# Patient Record
Sex: FEMALE | Race: WHITE | NOT HISPANIC OR LATINO | Employment: OTHER | URBAN - METROPOLITAN AREA
[De-identification: names, ages, dates, MRNs, and addresses within clinical notes are randomized per-mention and may not be internally consistent; named-entity substitution may affect disease eponyms.]

---

## 2017-03-17 ENCOUNTER — CONVERSION ENCOUNTER (OUTPATIENT)
Dept: MAMMOGRAPHY | Facility: HOSPITAL | Age: 62
End: 2017-03-17

## 2018-02-28 ENCOUNTER — OFFICE VISIT CONVERTED (OUTPATIENT)
Dept: FAMILY MEDICINE CLINIC | Facility: CLINIC | Age: 63
End: 2018-02-28
Attending: NURSE PRACTITIONER

## 2018-04-09 ENCOUNTER — OFFICE VISIT CONVERTED (OUTPATIENT)
Dept: FAMILY MEDICINE CLINIC | Facility: CLINIC | Age: 63
End: 2018-04-09
Attending: NURSE PRACTITIONER

## 2018-04-09 ENCOUNTER — CONVERSION ENCOUNTER (OUTPATIENT)
Dept: FAMILY MEDICINE CLINIC | Facility: CLINIC | Age: 63
End: 2018-04-09

## 2018-08-30 ENCOUNTER — OFFICE VISIT CONVERTED (OUTPATIENT)
Dept: FAMILY MEDICINE CLINIC | Facility: CLINIC | Age: 63
End: 2018-08-30
Attending: NURSE PRACTITIONER

## 2018-08-31 ENCOUNTER — PATIENT OUTREACH - CONVERTED (OUTPATIENT)
Dept: FAMILY MEDICINE CLINIC | Facility: CLINIC | Age: 63
End: 2018-08-31
Attending: NURSE PRACTITIONER

## 2018-09-17 ENCOUNTER — OFFICE VISIT CONVERTED (OUTPATIENT)
Dept: FAMILY MEDICINE CLINIC | Facility: CLINIC | Age: 63
End: 2018-09-17
Attending: NURSE PRACTITIONER

## 2018-09-28 ENCOUNTER — PATIENT OUTREACH - CONVERTED (OUTPATIENT)
Dept: FAMILY MEDICINE CLINIC | Facility: CLINIC | Age: 63
End: 2018-09-28
Attending: NURSE PRACTITIONER

## 2018-10-29 ENCOUNTER — PATIENT OUTREACH - CONVERTED (OUTPATIENT)
Dept: FAMILY MEDICINE CLINIC | Facility: CLINIC | Age: 63
End: 2018-10-29
Attending: NURSE PRACTITIONER

## 2018-11-19 ENCOUNTER — CONVERSION ENCOUNTER (OUTPATIENT)
Dept: FAMILY MEDICINE CLINIC | Facility: CLINIC | Age: 63
End: 2018-11-19

## 2018-11-19 ENCOUNTER — OFFICE VISIT CONVERTED (OUTPATIENT)
Dept: FAMILY MEDICINE CLINIC | Facility: CLINIC | Age: 63
End: 2018-11-19
Attending: NURSE PRACTITIONER

## 2018-11-26 ENCOUNTER — PATIENT OUTREACH - CONVERTED (OUTPATIENT)
Dept: FAMILY MEDICINE CLINIC | Facility: CLINIC | Age: 63
End: 2018-11-26
Attending: NURSE PRACTITIONER

## 2018-12-19 ENCOUNTER — OFFICE VISIT CONVERTED (OUTPATIENT)
Dept: FAMILY MEDICINE CLINIC | Facility: CLINIC | Age: 63
End: 2018-12-19
Attending: NURSE PRACTITIONER

## 2018-12-26 ENCOUNTER — PATIENT OUTREACH - CONVERTED (OUTPATIENT)
Dept: FAMILY MEDICINE CLINIC | Facility: CLINIC | Age: 63
End: 2018-12-26
Attending: NURSE PRACTITIONER

## 2019-01-28 ENCOUNTER — PATIENT OUTREACH - CONVERTED (OUTPATIENT)
Dept: FAMILY MEDICINE CLINIC | Facility: CLINIC | Age: 64
End: 2019-01-28
Attending: NURSE PRACTITIONER

## 2019-02-08 ENCOUNTER — OFFICE VISIT CONVERTED (OUTPATIENT)
Dept: FAMILY MEDICINE CLINIC | Facility: CLINIC | Age: 64
End: 2019-02-08
Attending: NURSE PRACTITIONER

## 2019-02-25 ENCOUNTER — PATIENT OUTREACH - CONVERTED (OUTPATIENT)
Dept: FAMILY MEDICINE CLINIC | Facility: CLINIC | Age: 64
End: 2019-02-25
Attending: NURSE PRACTITIONER

## 2019-03-25 ENCOUNTER — PATIENT OUTREACH - CONVERTED (OUTPATIENT)
Dept: FAMILY MEDICINE CLINIC | Facility: CLINIC | Age: 64
End: 2019-03-25
Attending: NURSE PRACTITIONER

## 2019-04-29 ENCOUNTER — PATIENT OUTREACH - CONVERTED (OUTPATIENT)
Dept: FAMILY MEDICINE CLINIC | Facility: CLINIC | Age: 64
End: 2019-04-29
Attending: NURSE PRACTITIONER

## 2019-05-14 ENCOUNTER — CONVERSION ENCOUNTER (OUTPATIENT)
Dept: ORTHOPEDIC SURGERY | Facility: CLINIC | Age: 64
End: 2019-05-14

## 2019-05-14 ENCOUNTER — OFFICE VISIT CONVERTED (OUTPATIENT)
Dept: ORTHOPEDIC SURGERY | Facility: CLINIC | Age: 64
End: 2019-05-14
Attending: ORTHOPAEDIC SURGERY

## 2019-05-17 ENCOUNTER — CONVERSION ENCOUNTER (OUTPATIENT)
Dept: FAMILY MEDICINE CLINIC | Facility: CLINIC | Age: 64
End: 2019-05-17

## 2019-05-17 ENCOUNTER — OFFICE VISIT CONVERTED (OUTPATIENT)
Dept: FAMILY MEDICINE CLINIC | Facility: CLINIC | Age: 64
End: 2019-05-17
Attending: NURSE PRACTITIONER

## 2019-05-30 ENCOUNTER — PATIENT OUTREACH - CONVERTED (OUTPATIENT)
Dept: FAMILY MEDICINE CLINIC | Facility: CLINIC | Age: 64
End: 2019-05-30
Attending: NURSE PRACTITIONER

## 2019-05-31 ENCOUNTER — OFFICE VISIT CONVERTED (OUTPATIENT)
Dept: ORTHOPEDIC SURGERY | Facility: CLINIC | Age: 64
End: 2019-05-31
Attending: PHYSICIAN ASSISTANT

## 2019-06-18 ENCOUNTER — HOSPITAL ENCOUNTER (OUTPATIENT)
Dept: MAMMOGRAPHY | Facility: HOSPITAL | Age: 64
Discharge: HOME | End: 2019-06-18
Attending: INTERNAL MEDICINE

## 2019-07-02 ENCOUNTER — OFFICE VISIT CONVERTED (OUTPATIENT)
Dept: ORTHOPEDIC SURGERY | Facility: CLINIC | Age: 64
End: 2019-07-02
Attending: PHYSICIAN ASSISTANT

## 2019-07-18 ENCOUNTER — OFFICE VISIT CONVERTED (OUTPATIENT)
Dept: FAMILY MEDICINE CLINIC | Facility: CLINIC | Age: 64
End: 2019-07-18
Attending: NURSE PRACTITIONER

## 2019-07-26 ENCOUNTER — PATIENT OUTREACH - CONVERTED (OUTPATIENT)
Dept: FAMILY MEDICINE CLINIC | Facility: CLINIC | Age: 64
End: 2019-07-26
Attending: NURSE PRACTITIONER

## 2019-08-13 ENCOUNTER — OFFICE VISIT CONVERTED (OUTPATIENT)
Dept: ORTHOPEDIC SURGERY | Facility: CLINIC | Age: 64
End: 2019-08-13
Attending: PHYSICIAN ASSISTANT

## 2019-08-14 ENCOUNTER — HOSPITAL ENCOUNTER (OUTPATIENT)
Dept: FAMILY MEDICINE CLINIC | Facility: CLINIC | Age: 64
Discharge: HOME | End: 2019-08-14
Attending: NURSE PRACTITIONER

## 2019-08-14 LAB
ALBUMIN SERPL-MCNC: 4.3 G/DL (ref 3.5–5)
ALBUMIN/GLOB SERPL: 1.5 {RATIO} (ref 1.4–2.6)
ALP SERPL-CCNC: 66 U/L (ref 43–160)
ALT SERPL-CCNC: 20 U/L (ref 10–40)
ANION GAP SERPL CALC-SCNC: 14 MMOL/L (ref 8–19)
AST SERPL-CCNC: 18 U/L (ref 15–50)
BASOPHILS # BLD AUTO: 0.06 10*3/UL (ref 0–0.2)
BASOPHILS NFR BLD AUTO: 0.9 % (ref 0–3)
BILIRUB SERPL-MCNC: 0.19 MG/DL (ref 0.2–1.3)
BUN SERPL-MCNC: 14 MG/DL (ref 5–25)
BUN/CREAT SERPL: 20 {RATIO} (ref 6–20)
CALCIUM SERPL-MCNC: 9.1 MG/DL (ref 8.7–10.4)
CHLORIDE SERPL-SCNC: 104 MMOL/L (ref 99–111)
CHOLEST SERPL-MCNC: 113 MG/DL (ref 107–200)
CHOLEST/HDLC SERPL: 2.3 {RATIO} (ref 3–6)
CONV ABS IMM GRAN: 0.01 10*3/UL (ref 0–0.2)
CONV CO2: 28 MMOL/L (ref 22–32)
CONV IMMATURE GRAN: 0.2 % (ref 0–1.8)
CONV TOTAL PROTEIN: 7.2 G/DL (ref 6.3–8.2)
CREAT UR-MCNC: 0.69 MG/DL (ref 0.5–0.9)
DEPRECATED RDW RBC AUTO: 48.4 FL (ref 36.4–46.3)
EOSINOPHIL # BLD AUTO: 0.12 10*3/UL (ref 0–0.7)
EOSINOPHIL # BLD AUTO: 1.8 % (ref 0–7)
ERYTHROCYTE [DISTWIDTH] IN BLOOD BY AUTOMATED COUNT: 13.4 % (ref 11.7–14.4)
GFR SERPLBLD BASED ON 1.73 SQ M-ARVRAT: >60 ML/MIN/{1.73_M2}
GLOBULIN UR ELPH-MCNC: 2.9 G/DL (ref 2–3.5)
GLUCOSE SERPL-MCNC: 90 MG/DL (ref 65–99)
HCT VFR BLD AUTO: 42 % (ref 37–47)
HDLC SERPL-MCNC: 50 MG/DL (ref 40–60)
HGB BLD-MCNC: 12.7 G/DL (ref 12–16)
LDLC SERPL CALC-MCNC: 49 MG/DL (ref 70–100)
LYMPHOCYTES # BLD AUTO: 2.87 10*3/UL (ref 1–5)
LYMPHOCYTES NFR BLD AUTO: 44 % (ref 20–45)
MCH RBC QN AUTO: 30 PG (ref 27–31)
MCHC RBC AUTO-ENTMCNC: 30.2 G/DL (ref 33–37)
MCV RBC AUTO: 99.3 FL (ref 81–99)
MONOCYTES # BLD AUTO: 0.39 10*3/UL (ref 0.2–1.2)
MONOCYTES NFR BLD AUTO: 6 % (ref 3–10)
NEUTROPHILS # BLD AUTO: 3.07 10*3/UL (ref 2–8)
NEUTROPHILS NFR BLD AUTO: 47.1 % (ref 30–85)
NRBC CBCN: 0 % (ref 0–0.7)
OSMOLALITY SERPL CALC.SUM OF ELEC: 294 MOSM/KG (ref 273–304)
PLATELET # BLD AUTO: 259 10*3/UL (ref 130–400)
PMV BLD AUTO: 11.9 FL (ref 9.4–12.3)
POTASSIUM SERPL-SCNC: 4.2 MMOL/L (ref 3.5–5.3)
RBC # BLD AUTO: 4.23 10*6/UL (ref 4.2–5.4)
SODIUM SERPL-SCNC: 142 MMOL/L (ref 135–147)
T4 FREE SERPL-MCNC: 1.1 NG/DL (ref 0.9–1.8)
TRIGL SERPL-MCNC: 72 MG/DL (ref 40–150)
TSH SERPL-ACNC: 0.96 M[IU]/L (ref 0.27–4.2)
VLDLC SERPL-MCNC: 14 MG/DL (ref 5–37)
WBC # BLD AUTO: 6.52 10*3/UL (ref 4.8–10.8)

## 2019-08-15 LAB — 25(OH)D3 SERPL-MCNC: 41.2 NG/ML (ref 30–100)

## 2019-08-19 ENCOUNTER — OFFICE VISIT CONVERTED (OUTPATIENT)
Dept: FAMILY MEDICINE CLINIC | Facility: CLINIC | Age: 64
End: 2019-08-19
Attending: NURSE PRACTITIONER

## 2019-08-29 ENCOUNTER — PATIENT OUTREACH - CONVERTED (OUTPATIENT)
Dept: FAMILY MEDICINE CLINIC | Facility: CLINIC | Age: 64
End: 2019-08-29
Attending: NURSE PRACTITIONER

## 2019-09-13 ENCOUNTER — OFFICE VISIT CONVERTED (OUTPATIENT)
Dept: ORTHOPEDIC SURGERY | Facility: CLINIC | Age: 64
End: 2019-09-13
Attending: ORTHOPAEDIC SURGERY

## 2019-09-27 ENCOUNTER — PATIENT OUTREACH - CONVERTED (OUTPATIENT)
Dept: FAMILY MEDICINE CLINIC | Facility: CLINIC | Age: 64
End: 2019-09-27
Attending: NURSE PRACTITIONER

## 2019-11-15 ENCOUNTER — OFFICE VISIT CONVERTED (OUTPATIENT)
Dept: FAMILY MEDICINE CLINIC | Facility: CLINIC | Age: 64
End: 2019-11-15
Attending: NURSE PRACTITIONER

## 2019-11-26 ENCOUNTER — PATIENT OUTREACH - CONVERTED (OUTPATIENT)
Dept: FAMILY MEDICINE CLINIC | Facility: CLINIC | Age: 64
End: 2019-11-26
Attending: NURSE PRACTITIONER

## 2019-12-26 ENCOUNTER — PATIENT OUTREACH - CONVERTED (OUTPATIENT)
Dept: FAMILY MEDICINE CLINIC | Facility: CLINIC | Age: 64
End: 2019-12-26
Attending: NURSE PRACTITIONER

## 2019-12-26 ENCOUNTER — HOSPITAL ENCOUNTER (OUTPATIENT)
Dept: MRI IMAGING | Facility: HOSPITAL | Age: 64
Discharge: HOME | End: 2019-12-26
Attending: NURSE PRACTITIONER

## 2020-01-08 ENCOUNTER — CONVERSION ENCOUNTER (OUTPATIENT)
Dept: SURGERY | Facility: CLINIC | Age: 65
End: 2020-01-08

## 2020-01-08 ENCOUNTER — OFFICE VISIT CONVERTED (OUTPATIENT)
Dept: SURGERY | Facility: CLINIC | Age: 65
End: 2020-01-08
Attending: SURGERY

## 2020-01-17 ENCOUNTER — HOSPITAL ENCOUNTER (OUTPATIENT)
Dept: PERIOP | Facility: HOSPITAL | Age: 65
Setting detail: HOSPITAL OUTPATIENT SURGERY
Discharge: HOME | End: 2020-01-17
Attending: SURGERY

## 2020-01-23 ENCOUNTER — CONVERSION ENCOUNTER (OUTPATIENT)
Dept: SURGERY | Facility: CLINIC | Age: 65
End: 2020-01-23

## 2020-01-23 ENCOUNTER — OFFICE VISIT CONVERTED (OUTPATIENT)
Dept: SURGERY | Facility: CLINIC | Age: 65
End: 2020-01-23
Attending: SURGERY

## 2020-02-12 ENCOUNTER — HOSPITAL ENCOUNTER (OUTPATIENT)
Dept: URGENT CARE | Facility: CLINIC | Age: 65
Discharge: HOME | End: 2020-02-12

## 2020-02-13 ENCOUNTER — OFFICE VISIT CONVERTED (OUTPATIENT)
Dept: SURGERY | Facility: CLINIC | Age: 65
End: 2020-02-13
Attending: SURGERY

## 2020-02-13 ENCOUNTER — CONVERSION ENCOUNTER (OUTPATIENT)
Dept: SURGERY | Facility: CLINIC | Age: 65
End: 2020-02-13

## 2020-03-06 ENCOUNTER — HOSPITAL ENCOUNTER (OUTPATIENT)
Dept: PERIOP | Facility: HOSPITAL | Age: 65
Setting detail: HOSPITAL OUTPATIENT SURGERY
Discharge: HOME | End: 2020-03-06
Attending: SURGERY

## 2020-03-06 LAB — GLUCOSE BLD-MCNC: 100 MG/DL (ref 65–99)

## 2020-03-10 ENCOUNTER — OFFICE VISIT CONVERTED (OUTPATIENT)
Dept: FAMILY MEDICINE CLINIC | Facility: CLINIC | Age: 65
End: 2020-03-10
Attending: NURSE PRACTITIONER

## 2020-03-19 ENCOUNTER — OFFICE VISIT CONVERTED (OUTPATIENT)
Dept: SURGERY | Facility: CLINIC | Age: 65
End: 2020-03-19
Attending: SURGERY

## 2020-04-29 ENCOUNTER — PATIENT OUTREACH - CONVERTED (OUTPATIENT)
Dept: FAMILY MEDICINE CLINIC | Facility: CLINIC | Age: 65
End: 2020-04-29
Attending: NURSE PRACTITIONER

## 2020-05-06 ENCOUNTER — OFFICE VISIT CONVERTED (OUTPATIENT)
Dept: SURGERY | Facility: CLINIC | Age: 65
End: 2020-05-06
Attending: SURGERY

## 2020-05-15 LAB — SARS-COV-2 RNA SPEC QL NAA+PROBE: NOT DETECTED

## 2020-05-18 ENCOUNTER — HOSPITAL ENCOUNTER (OUTPATIENT)
Dept: PERIOP | Facility: HOSPITAL | Age: 65
Setting detail: HOSPITAL OUTPATIENT SURGERY
Discharge: HOME | End: 2020-05-18
Attending: SURGERY

## 2020-06-03 ENCOUNTER — OFFICE VISIT CONVERTED (OUTPATIENT)
Dept: SURGERY | Facility: CLINIC | Age: 65
End: 2020-06-03
Attending: SURGERY

## 2020-06-04 ENCOUNTER — TELEMEDICINE CONVERTED (OUTPATIENT)
Dept: FAMILY MEDICINE CLINIC | Facility: CLINIC | Age: 65
End: 2020-06-04
Attending: NURSE PRACTITIONER

## 2020-06-16 ENCOUNTER — HOSPITAL ENCOUNTER (OUTPATIENT)
Dept: FAMILY MEDICINE CLINIC | Facility: CLINIC | Age: 65
Discharge: HOME | End: 2020-06-16
Attending: NURSE PRACTITIONER

## 2020-06-16 LAB
ALBUMIN SERPL-MCNC: 3.9 G/DL (ref 3.5–5)
ALBUMIN/GLOB SERPL: 1.4 {RATIO} (ref 1.4–2.6)
ALP SERPL-CCNC: 70 U/L (ref 43–160)
ALT SERPL-CCNC: 9 U/L (ref 10–40)
ANION GAP SERPL CALC-SCNC: 16 MMOL/L (ref 8–19)
AST SERPL-CCNC: 13 U/L (ref 15–50)
BASOPHILS # BLD AUTO: 0.07 10*3/UL (ref 0–0.2)
BASOPHILS NFR BLD AUTO: 1.3 % (ref 0–3)
BILIRUB SERPL-MCNC: <0.15 MG/DL (ref 0.2–1.3)
BUN SERPL-MCNC: 16 MG/DL (ref 5–25)
BUN/CREAT SERPL: 19 {RATIO} (ref 6–20)
CALCIUM SERPL-MCNC: 9.4 MG/DL (ref 8.7–10.4)
CHLORIDE SERPL-SCNC: 106 MMOL/L (ref 99–111)
CHOLEST SERPL-MCNC: 181 MG/DL (ref 107–200)
CHOLEST/HDLC SERPL: 2.8 {RATIO} (ref 3–6)
CONV ABS IMM GRAN: 0.01 10*3/UL (ref 0–0.2)
CONV CO2: 27 MMOL/L (ref 22–32)
CONV IMMATURE GRAN: 0.2 % (ref 0–1.8)
CONV TOTAL PROTEIN: 6.7 G/DL (ref 6.3–8.2)
CREAT UR-MCNC: 0.85 MG/DL (ref 0.5–0.9)
DEPRECATED RDW RBC AUTO: 50.7 FL (ref 36.4–46.3)
EOSINOPHIL # BLD AUTO: 0.19 10*3/UL (ref 0–0.7)
EOSINOPHIL # BLD AUTO: 3.5 % (ref 0–7)
ERYTHROCYTE [DISTWIDTH] IN BLOOD BY AUTOMATED COUNT: 14.3 % (ref 11.7–14.4)
GFR SERPLBLD BASED ON 1.73 SQ M-ARVRAT: >60 ML/MIN/{1.73_M2}
GLOBULIN UR ELPH-MCNC: 2.8 G/DL (ref 2–3.5)
GLUCOSE SERPL-MCNC: 133 MG/DL (ref 65–99)
HCT VFR BLD AUTO: 40.7 % (ref 37–47)
HDLC SERPL-MCNC: 65 MG/DL (ref 40–60)
HGB BLD-MCNC: 12.4 G/DL (ref 12–16)
LDLC SERPL CALC-MCNC: 97 MG/DL (ref 70–100)
LYMPHOCYTES # BLD AUTO: 3.01 10*3/UL (ref 1–5)
LYMPHOCYTES NFR BLD AUTO: 55.7 % (ref 20–45)
MCH RBC QN AUTO: 29.3 PG (ref 27–31)
MCHC RBC AUTO-ENTMCNC: 30.5 G/DL (ref 33–37)
MCV RBC AUTO: 96.2 FL (ref 81–99)
MONOCYTES # BLD AUTO: 0.44 10*3/UL (ref 0.2–1.2)
MONOCYTES NFR BLD AUTO: 8.1 % (ref 3–10)
NEUTROPHILS # BLD AUTO: 1.68 10*3/UL (ref 2–8)
NEUTROPHILS NFR BLD AUTO: 31.2 % (ref 30–85)
NRBC CBCN: 0 % (ref 0–0.7)
OSMOLALITY SERPL CALC.SUM OF ELEC: 303 MOSM/KG (ref 273–304)
PLATELET # BLD AUTO: 237 10*3/UL (ref 130–400)
PMV BLD AUTO: 10.4 FL (ref 9.4–12.3)
POTASSIUM SERPL-SCNC: 4.1 MMOL/L (ref 3.5–5.3)
RBC # BLD AUTO: 4.23 10*6/UL (ref 4.2–5.4)
SODIUM SERPL-SCNC: 145 MMOL/L (ref 135–147)
TRIGL SERPL-MCNC: 97 MG/DL (ref 40–150)
TSH SERPL-ACNC: 2.6 M[IU]/L (ref 0.27–4.2)
VLDLC SERPL-MCNC: 19 MG/DL (ref 5–37)
WBC # BLD AUTO: 5.4 10*3/UL (ref 4.8–10.8)

## 2020-06-30 ENCOUNTER — OFFICE VISIT CONVERTED (OUTPATIENT)
Dept: NEUROLOGY | Facility: CLINIC | Age: 65
End: 2020-06-30
Attending: PSYCHIATRY & NEUROLOGY

## 2020-08-04 ENCOUNTER — OFFICE VISIT CONVERTED (OUTPATIENT)
Dept: ORTHOPEDIC SURGERY | Facility: CLINIC | Age: 65
End: 2020-08-04
Attending: ORTHOPAEDIC SURGERY

## 2020-08-08 ENCOUNTER — HOSPITAL ENCOUNTER (OUTPATIENT)
Dept: MRI IMAGING | Facility: HOSPITAL | Age: 65
Discharge: HOME | End: 2020-08-08

## 2020-08-24 ENCOUNTER — TELEMEDICINE CONVERTED (OUTPATIENT)
Dept: FAMILY MEDICINE CLINIC | Facility: CLINIC | Age: 65
End: 2020-08-24
Attending: NURSE PRACTITIONER

## 2020-08-25 ENCOUNTER — CONVERSION ENCOUNTER (OUTPATIENT)
Dept: ORTHOPEDIC SURGERY | Facility: CLINIC | Age: 65
End: 2020-08-25

## 2020-08-25 ENCOUNTER — OFFICE VISIT CONVERTED (OUTPATIENT)
Dept: ORTHOPEDIC SURGERY | Facility: CLINIC | Age: 65
End: 2020-08-25
Attending: PHYSICIAN ASSISTANT

## 2020-08-26 ENCOUNTER — PATIENT OUTREACH - CONVERTED (OUTPATIENT)
Dept: FAMILY MEDICINE CLINIC | Facility: CLINIC | Age: 65
End: 2020-08-26
Attending: NURSE PRACTITIONER

## 2020-10-01 ENCOUNTER — HOSPITAL ENCOUNTER (OUTPATIENT)
Dept: OTHER | Facility: HOSPITAL | Age: 65
Discharge: HOME | End: 2020-10-01
Attending: PHYSICIAN ASSISTANT

## 2020-10-20 ENCOUNTER — OFFICE VISIT CONVERTED (OUTPATIENT)
Dept: ORTHOPEDIC SURGERY | Facility: CLINIC | Age: 65
End: 2020-10-20
Attending: PHYSICIAN ASSISTANT

## 2020-11-11 ENCOUNTER — OFFICE VISIT CONVERTED (OUTPATIENT)
Dept: SURGERY | Facility: CLINIC | Age: 65
End: 2020-11-11
Attending: SURGERY

## 2020-11-30 ENCOUNTER — HOSPITAL ENCOUNTER (OUTPATIENT)
Dept: PREADMISSION TESTING | Facility: HOSPITAL | Age: 65
Discharge: HOME | End: 2020-11-30
Attending: SURGERY

## 2020-12-01 ENCOUNTER — TELEPHONE CONVERTED (OUTPATIENT)
Dept: FAMILY MEDICINE CLINIC | Facility: CLINIC | Age: 65
End: 2020-12-01
Attending: NURSE PRACTITIONER

## 2020-12-01 LAB — SARS-COV-2 RNA SPEC QL NAA+PROBE: NOT DETECTED

## 2020-12-04 ENCOUNTER — HOSPITAL ENCOUNTER (OUTPATIENT)
Dept: PERIOP | Facility: HOSPITAL | Age: 65
Setting detail: HOSPITAL OUTPATIENT SURGERY
Discharge: HOME | End: 2020-12-04
Attending: SURGERY

## 2020-12-23 ENCOUNTER — OFFICE VISIT CONVERTED (OUTPATIENT)
Dept: SURGERY | Facility: CLINIC | Age: 65
End: 2020-12-23
Attending: SURGERY

## 2020-12-31 ENCOUNTER — PATIENT OUTREACH - CONVERTED (OUTPATIENT)
Dept: FAMILY MEDICINE CLINIC | Facility: CLINIC | Age: 65
End: 2020-12-31
Attending: NURSE PRACTITIONER

## 2021-01-01 ENCOUNTER — OFFICE VISIT (OUTPATIENT)
Dept: FAMILY MEDICINE CLINIC | Facility: CLINIC | Age: 66
End: 2021-01-01

## 2021-01-01 ENCOUNTER — PATIENT OUTREACH (OUTPATIENT)
Dept: CASE MANAGEMENT | Facility: OTHER | Age: 66
End: 2021-01-01

## 2021-01-01 ENCOUNTER — TELEPHONE (OUTPATIENT)
Dept: FAMILY MEDICINE CLINIC | Facility: CLINIC | Age: 66
End: 2021-01-01

## 2021-01-01 VITALS
HEART RATE: 75 BPM | SYSTOLIC BLOOD PRESSURE: 144 MMHG | HEIGHT: 66 IN | TEMPERATURE: 97.5 F | OXYGEN SATURATION: 96 % | RESPIRATION RATE: 20 BRPM | BODY MASS INDEX: 25.47 KG/M2 | WEIGHT: 158.5 LBS | DIASTOLIC BLOOD PRESSURE: 80 MMHG

## 2021-01-01 DIAGNOSIS — I10 HYPERTENSION, UNSPECIFIED TYPE: ICD-10-CM

## 2021-01-01 DIAGNOSIS — F41.9 ANXIETY: ICD-10-CM

## 2021-01-01 DIAGNOSIS — B37.0 THRUSH OF MOUTH AND ESOPHAGUS (HCC): Primary | ICD-10-CM

## 2021-01-01 DIAGNOSIS — Z51.81 MEDICATION MONITORING ENCOUNTER: ICD-10-CM

## 2021-01-01 DIAGNOSIS — B37.81 THRUSH OF MOUTH AND ESOPHAGUS (HCC): Primary | ICD-10-CM

## 2021-01-01 DIAGNOSIS — F32.2 MAJOR DEPRESSIVE DISORDER, SEVERE (HCC): Primary | ICD-10-CM

## 2021-01-01 DIAGNOSIS — E78.2 MIXED HYPERLIPIDEMIA: ICD-10-CM

## 2021-01-01 DIAGNOSIS — E03.9 ACQUIRED HYPOTHYROIDISM: ICD-10-CM

## 2021-01-01 DIAGNOSIS — E55.9 VITAMIN D DEFICIENCY: ICD-10-CM

## 2021-01-01 DIAGNOSIS — L30.9 DERMATITIS: ICD-10-CM

## 2021-01-01 DIAGNOSIS — F33.1 MODERATE EPISODE OF RECURRENT MAJOR DEPRESSIVE DISORDER (HCC): ICD-10-CM

## 2021-01-01 DIAGNOSIS — K59.03 CONSTIPATION DUE TO OPIOID THERAPY: ICD-10-CM

## 2021-01-01 DIAGNOSIS — M25.551 RIGHT HIP PAIN: ICD-10-CM

## 2021-01-01 DIAGNOSIS — I10 PRIMARY HYPERTENSION: ICD-10-CM

## 2021-01-01 DIAGNOSIS — Z01.419 WELL WOMAN EXAM WITH ROUTINE GYNECOLOGICAL EXAM: Primary | ICD-10-CM

## 2021-01-01 DIAGNOSIS — Z12.31 ENCOUNTER FOR SCREENING MAMMOGRAM FOR MALIGNANT NEOPLASM OF BREAST: ICD-10-CM

## 2021-01-01 DIAGNOSIS — D64.9 ANEMIA, UNSPECIFIED TYPE: ICD-10-CM

## 2021-01-01 DIAGNOSIS — N18.31 STAGE 3A CHRONIC KIDNEY DISEASE (HCC): ICD-10-CM

## 2021-01-01 DIAGNOSIS — R39.198 ABNORMAL URINATION: ICD-10-CM

## 2021-01-01 DIAGNOSIS — T40.2X5A CONSTIPATION DUE TO OPIOID THERAPY: ICD-10-CM

## 2021-01-01 DIAGNOSIS — Z11.59 NEED FOR HEPATITIS C SCREENING TEST: ICD-10-CM

## 2021-01-01 LAB
ALBUMIN SERPL-MCNC: 4.2 G/DL (ref 3.5–5.2)
ALBUMIN/GLOB SERPL: 1.9 G/DL
ALP SERPL-CCNC: 71 U/L (ref 39–117)
ALT SERPL W P-5'-P-CCNC: 8 U/L (ref 1–33)
ANION GAP SERPL CALCULATED.3IONS-SCNC: 8.6 MMOL/L (ref 5–15)
AST SERPL-CCNC: 12 U/L (ref 1–32)
BASOPHILS # BLD AUTO: 0.04 10*3/MM3 (ref 0–0.2)
BASOPHILS NFR BLD AUTO: 0.7 % (ref 0–1.5)
BILIRUB BLD-MCNC: ABNORMAL MG/DL
BILIRUB SERPL-MCNC: <0.2 MG/DL (ref 0–1.2)
BUN SERPL-MCNC: 18 MG/DL (ref 8–23)
BUN/CREAT SERPL: 17.3 (ref 7–25)
CALCIUM SPEC-SCNC: 8.9 MG/DL (ref 8.6–10.5)
CHLORIDE SERPL-SCNC: 102 MMOL/L (ref 98–107)
CHOLEST SERPL-MCNC: 161 MG/DL (ref 0–200)
CLARITY, POC: CLEAR
CO2 SERPL-SCNC: 28.4 MMOL/L (ref 22–29)
COLOR UR: YELLOW
CONV .: NORMAL
CREAT SERPL-MCNC: 1.04 MG/DL (ref 0.57–1)
CYTOLOGIST CVX/VAG CYTO: NORMAL
CYTOLOGY CVX/VAG DOC CYTO: NORMAL
CYTOLOGY CVX/VAG DOC THIN PREP: NORMAL
DEPRECATED RDW RBC AUTO: 45.1 FL (ref 37–54)
DX ICD CODE: NORMAL
EOSINOPHIL # BLD AUTO: 0.1 10*3/MM3 (ref 0–0.4)
EOSINOPHIL NFR BLD AUTO: 1.7 % (ref 0.3–6.2)
ERYTHROCYTE [DISTWIDTH] IN BLOOD BY AUTOMATED COUNT: 13.2 % (ref 12.3–15.4)
EXPIRATION DATE: ABNORMAL
FOLATE SERPL-MCNC: 4.13 NG/ML (ref 4.78–24.2)
GFR SERPL CREATININE-BSD FRML MDRD: 53 ML/MIN/1.73
GLOBULIN UR ELPH-MCNC: 2.2 GM/DL
GLUCOSE SERPL-MCNC: 90 MG/DL (ref 65–99)
GLUCOSE UR STRIP-MCNC: NEGATIVE MG/DL
HCT VFR BLD AUTO: 35.9 % (ref 34–46.6)
HCV AB SER DONR QL: NORMAL
HDLC SERPL-MCNC: 54 MG/DL (ref 40–60)
HGB BLD-MCNC: 11.7 G/DL (ref 12–15.9)
HIV 1 & 2 AB SER-IMP: NORMAL
IMM GRANULOCYTES # BLD AUTO: 0.01 10*3/MM3 (ref 0–0.05)
IMM GRANULOCYTES NFR BLD AUTO: 0.2 % (ref 0–0.5)
IRON 24H UR-MRATE: 88 MCG/DL (ref 37–145)
IRON SATN MFR SERPL: 24 % (ref 20–50)
KETONES UR QL: NEGATIVE
LDLC SERPL CALC-MCNC: 79 MG/DL (ref 0–100)
LDLC/HDLC SERPL: 1.37 {RATIO}
LEUKOCYTE EST, POC: NEGATIVE
LYMPHOCYTES # BLD AUTO: 2.58 10*3/MM3 (ref 0.7–3.1)
LYMPHOCYTES NFR BLD AUTO: 44 % (ref 19.6–45.3)
Lab: ABNORMAL
MCH RBC QN AUTO: 30.3 PG (ref 26.6–33)
MCHC RBC AUTO-ENTMCNC: 32.6 G/DL (ref 31.5–35.7)
MCV RBC AUTO: 93 FL (ref 79–97)
MONOCYTES # BLD AUTO: 0.41 10*3/MM3 (ref 0.1–0.9)
MONOCYTES NFR BLD AUTO: 7 % (ref 5–12)
NEUTROPHILS NFR BLD AUTO: 2.73 10*3/MM3 (ref 1.7–7)
NEUTROPHILS NFR BLD AUTO: 46.4 % (ref 42.7–76)
NITRITE UR-MCNC: NEGATIVE MG/ML
NRBC BLD AUTO-RTO: 0 /100 WBC (ref 0–0.2)
OTHER STN SPEC: NORMAL
PH UR: 7 [PH] (ref 5–8)
PLATELET # BLD AUTO: 222 10*3/MM3 (ref 140–450)
PMV BLD AUTO: 11 FL (ref 6–12)
POTASSIUM SERPL-SCNC: 4.1 MMOL/L (ref 3.5–5.2)
PROT SERPL-MCNC: 6.4 G/DL (ref 6–8.5)
PROT UR STRIP-MCNC: ABNORMAL MG/DL
RBC # BLD AUTO: 3.86 10*6/MM3 (ref 3.77–5.28)
RBC # UR STRIP: NEGATIVE /UL
SODIUM SERPL-SCNC: 139 MMOL/L (ref 136–145)
SP GR UR: 1.02 (ref 1–1.03)
STAT OF ADQ CVX/VAG CYTO-IMP: NORMAL
TIBC SERPL-MCNC: 365 MCG/DL (ref 298–536)
TRANSFERRIN SERPL-MCNC: 245 MG/DL (ref 200–360)
TRIGL SERPL-MCNC: 164 MG/DL (ref 0–150)
TSH SERPL DL<=0.05 MIU/L-ACNC: 1.43 UIU/ML (ref 0.27–4.2)
UROBILINOGEN UR QL: NORMAL
VIT B12 BLD-MCNC: 942 PG/ML (ref 211–946)
VLDLC SERPL-MCNC: 28 MG/DL (ref 5–40)
WBC NRBC COR # BLD: 5.87 10*3/MM3 (ref 3.4–10.8)

## 2021-01-01 PROCEDURE — 84466 ASSAY OF TRANSFERRIN: CPT | Performed by: NURSE PRACTITIONER

## 2021-01-01 PROCEDURE — 99397 PER PM REEVAL EST PAT 65+ YR: CPT | Performed by: NURSE PRACTITIONER

## 2021-01-01 PROCEDURE — 80053 COMPREHEN METABOLIC PANEL: CPT | Performed by: NURSE PRACTITIONER

## 2021-01-01 PROCEDURE — 85025 COMPLETE CBC W/AUTO DIFF WBC: CPT | Performed by: NURSE PRACTITIONER

## 2021-01-01 PROCEDURE — G0123 SCREEN CERV/VAG THIN LAYER: HCPCS | Performed by: NURSE PRACTITIONER

## 2021-01-01 PROCEDURE — 99213 OFFICE O/P EST LOW 20 MIN: CPT | Performed by: NURSE PRACTITIONER

## 2021-01-01 PROCEDURE — 82607 VITAMIN B-12: CPT | Performed by: NURSE PRACTITIONER

## 2021-01-01 PROCEDURE — 80061 LIPID PANEL: CPT | Performed by: NURSE PRACTITIONER

## 2021-01-01 PROCEDURE — 83540 ASSAY OF IRON: CPT | Performed by: NURSE PRACTITIONER

## 2021-01-01 PROCEDURE — 81003 URINALYSIS AUTO W/O SCOPE: CPT | Performed by: NURSE PRACTITIONER

## 2021-01-01 PROCEDURE — 99490 CHRNC CARE MGMT STAFF 1ST 20: CPT | Performed by: NURSE PRACTITIONER

## 2021-01-01 PROCEDURE — 84443 ASSAY THYROID STIM HORMONE: CPT | Performed by: NURSE PRACTITIONER

## 2021-01-01 PROCEDURE — 36415 COLL VENOUS BLD VENIPUNCTURE: CPT | Performed by: NURSE PRACTITIONER

## 2021-01-01 PROCEDURE — 86803 HEPATITIS C AB TEST: CPT | Performed by: NURSE PRACTITIONER

## 2021-01-01 PROCEDURE — 82746 ASSAY OF FOLIC ACID SERUM: CPT | Performed by: NURSE PRACTITIONER

## 2021-01-01 RX ORDER — TRIAMCINOLONE ACETONIDE 1 MG/G
CREAM TOPICAL 2 TIMES DAILY
Qty: 28 G | Refills: 0 | Status: SHIPPED | OUTPATIENT
Start: 2021-01-01

## 2021-01-01 RX ORDER — CYCLOBENZAPRINE HCL 10 MG
10 TABLET ORAL 3 TIMES DAILY PRN
Qty: 90 TABLET | Refills: 0 | Status: SHIPPED | OUTPATIENT
Start: 2021-01-01

## 2021-01-01 RX ORDER — TOPIRAMATE 100 MG/1
100 TABLET, FILM COATED ORAL DAILY
Qty: 30 TABLET | Refills: 0 | Status: SHIPPED | OUTPATIENT
Start: 2021-01-01

## 2021-01-01 RX ORDER — CHOLECALCIFEROL (VITAMIN D3) 1250 MCG
50000 CAPSULE ORAL
Qty: 13 CAPSULE | Refills: 1 | Status: SHIPPED | OUTPATIENT
Start: 2021-01-01 | End: 2021-01-01 | Stop reason: SDUPTHER

## 2021-01-01 RX ORDER — AMMONIUM LACTATE 12 G/100G
CREAM TOPICAL AS NEEDED
Qty: 1 EACH | Refills: 5 | Status: SHIPPED | OUTPATIENT
Start: 2021-01-01 | End: 2021-01-01 | Stop reason: SDUPTHER

## 2021-01-01 RX ORDER — ARIPIPRAZOLE 5 MG/1
5 TABLET ORAL DAILY
Qty: 90 TABLET | Refills: 1 | Status: SHIPPED | OUTPATIENT
Start: 2021-01-01

## 2021-01-01 RX ORDER — AMMONIUM LACTATE 12 G/100G
CREAM TOPICAL DAILY
Qty: 1 EACH | Refills: 5 | Status: SHIPPED | OUTPATIENT
Start: 2021-01-01 | End: 2021-01-01

## 2021-01-01 RX ORDER — AMMONIUM LACTATE 12 G/100G
CREAM TOPICAL DAILY
Qty: 1 EACH | Refills: 5 | Status: SHIPPED | OUTPATIENT
Start: 2021-01-01

## 2021-01-01 RX ORDER — CHOLECALCIFEROL (VITAMIN D3) 1250 MCG
50000 CAPSULE ORAL
Qty: 13 CAPSULE | Refills: 1 | Status: SHIPPED | OUTPATIENT
Start: 2021-01-01

## 2021-01-01 RX ORDER — ESCITALOPRAM OXALATE 10 MG/1
10 TABLET ORAL DAILY
Qty: 30 TABLET | Refills: 0 | Status: SHIPPED | OUTPATIENT
Start: 2021-01-01 | End: 2022-01-01

## 2021-02-04 ENCOUNTER — OFFICE VISIT CONVERTED (OUTPATIENT)
Dept: ORTHOPEDIC SURGERY | Facility: CLINIC | Age: 66
End: 2021-02-04
Attending: PHYSICIAN ASSISTANT

## 2021-02-25 ENCOUNTER — TELEPHONE CONVERTED (OUTPATIENT)
Dept: FAMILY MEDICINE CLINIC | Facility: CLINIC | Age: 66
End: 2021-02-25
Attending: NURSE PRACTITIONER

## 2021-02-26 ENCOUNTER — PATIENT OUTREACH - CONVERTED (OUTPATIENT)
Dept: FAMILY MEDICINE CLINIC | Facility: CLINIC | Age: 66
End: 2021-02-26
Attending: NURSE PRACTITIONER

## 2021-03-08 ENCOUNTER — HOSPITAL ENCOUNTER (OUTPATIENT)
Dept: PREADMISSION TESTING | Facility: HOSPITAL | Age: 66
Discharge: HOME | End: 2021-03-08
Attending: ORTHOPAEDIC SURGERY

## 2021-03-08 LAB
ALBUMIN SERPL-MCNC: 3.7 G/DL (ref 3.5–5)
ALBUMIN/GLOB SERPL: 1.3 {RATIO} (ref 1.4–2.6)
ALP SERPL-CCNC: 81 U/L (ref 43–160)
ALT SERPL-CCNC: 19 U/L (ref 10–40)
ANION GAP SERPL CALC-SCNC: 9 MMOL/L (ref 8–19)
APTT BLD: 21.6 S (ref 22.2–34.2)
AST SERPL-CCNC: 13 U/L (ref 15–50)
BASOPHILS # BLD AUTO: 0.05 10*3/UL (ref 0–0.2)
BASOPHILS NFR BLD AUTO: 0.7 % (ref 0–3)
BILIRUB SERPL-MCNC: 0.16 MG/DL (ref 0.2–1.3)
BUN SERPL-MCNC: 23 MG/DL (ref 5–25)
BUN/CREAT SERPL: 32 {RATIO} (ref 6–20)
CALCIUM SERPL-MCNC: 9.1 MG/DL (ref 8.7–10.4)
CHLORIDE SERPL-SCNC: 108 MMOL/L (ref 99–111)
CONV ABS IMM GRAN: 0.02 10*3/UL (ref 0–0.2)
CONV CO2: 29 MMOL/L (ref 22–32)
CONV IMMATURE GRAN: 0.3 % (ref 0–1.8)
CONV TOTAL PROTEIN: 6.6 G/DL (ref 6.3–8.2)
CREAT UR-MCNC: 0.72 MG/DL (ref 0.5–0.9)
DEPRECATED RDW RBC AUTO: 49.1 FL (ref 36.4–46.3)
EOSINOPHIL # BLD AUTO: 0.11 10*3/UL (ref 0–0.7)
EOSINOPHIL # BLD AUTO: 1.5 % (ref 0–7)
ERYTHROCYTE [DISTWIDTH] IN BLOOD BY AUTOMATED COUNT: 13.9 % (ref 11.7–14.4)
EST. AVERAGE GLUCOSE BLD GHB EST-MCNC: 114 MG/DL
GFR SERPLBLD BASED ON 1.73 SQ M-ARVRAT: >60 ML/MIN/{1.73_M2}
GLOBULIN UR ELPH-MCNC: 2.9 G/DL (ref 2–3.5)
GLUCOSE SERPL-MCNC: 99 MG/DL (ref 65–99)
HBA1C MFR BLD: 5.6 % (ref 3.5–5.7)
HCT VFR BLD AUTO: 42.3 % (ref 37–47)
HGB BLD-MCNC: 13.4 G/DL (ref 12–16)
INR PPP: 0.89 (ref 2–3)
LYMPHOCYTES # BLD AUTO: 2.23 10*3/UL (ref 1–5)
LYMPHOCYTES NFR BLD AUTO: 31.3 % (ref 20–45)
MCH RBC QN AUTO: 30.3 PG (ref 27–31)
MCHC RBC AUTO-ENTMCNC: 31.7 G/DL (ref 33–37)
MCV RBC AUTO: 95.7 FL (ref 81–99)
MONOCYTES # BLD AUTO: 0.44 10*3/UL (ref 0.2–1.2)
MONOCYTES NFR BLD AUTO: 6.2 % (ref 3–10)
NEUTROPHILS # BLD AUTO: 4.28 10*3/UL (ref 2–8)
NEUTROPHILS NFR BLD AUTO: 60 % (ref 30–85)
NRBC CBCN: 0 % (ref 0–0.7)
OSMOLALITY SERPL CALC.SUM OF ELEC: 298 MOSM/KG (ref 273–304)
PLATELET # BLD AUTO: 225 10*3/UL (ref 130–400)
PMV BLD AUTO: 9.3 FL (ref 9.4–12.3)
POTASSIUM SERPL-SCNC: 4.1 MMOL/L (ref 3.5–5.3)
PROTHROMBIN TIME: 9.9 S (ref 9.4–12)
RBC # BLD AUTO: 4.42 10*6/UL (ref 4.2–5.4)
SODIUM SERPL-SCNC: 142 MMOL/L (ref 135–147)
WBC # BLD AUTO: 7.13 10*3/UL (ref 4.8–10.8)

## 2021-03-18 ENCOUNTER — HOSPITAL ENCOUNTER (OUTPATIENT)
Dept: PREADMISSION TESTING | Facility: HOSPITAL | Age: 66
Discharge: HOME | End: 2021-03-18
Attending: ORTHOPAEDIC SURGERY

## 2021-03-19 LAB — SARS-COV-2 RNA SPEC QL NAA+PROBE: NOT DETECTED

## 2021-03-22 ENCOUNTER — HOSPITAL ENCOUNTER (OUTPATIENT)
Dept: PERIOP | Facility: HOSPITAL | Age: 66
Setting detail: HOSPITAL OUTPATIENT SURGERY
Discharge: NURSING, REHAB OR LONG-TERM CARE | End: 2021-03-24
Attending: INTERNAL MEDICINE

## 2021-03-23 LAB
ANION GAP SERPL CALC-SCNC: 10 MMOL/L (ref 8–19)
BUN SERPL-MCNC: 14 MG/DL (ref 5–25)
BUN/CREAT SERPL: 19 {RATIO} (ref 6–20)
CALCIUM SERPL-MCNC: 8 MG/DL (ref 8.7–10.4)
CHLORIDE SERPL-SCNC: 101 MMOL/L (ref 99–111)
CONV CO2: 27 MMOL/L (ref 22–32)
CREAT UR-MCNC: 0.72 MG/DL (ref 0.5–0.9)
GFR SERPLBLD BASED ON 1.73 SQ M-ARVRAT: >60 ML/MIN/{1.73_M2}
GLUCOSE SERPL-MCNC: 104 MG/DL (ref 65–99)
HCT VFR BLD AUTO: 32.9 % (ref 37–47)
HGB BLD-MCNC: 10.3 G/DL (ref 12–16)
OSMOLALITY SERPL CALC.SUM OF ELEC: 279 MOSM/KG (ref 273–304)
POTASSIUM SERPL-SCNC: 3.9 MMOL/L (ref 3.5–5.3)
SODIUM SERPL-SCNC: 134 MMOL/L (ref 135–147)

## 2021-03-24 LAB
ANION GAP SERPL CALC-SCNC: 12 MMOL/L (ref 8–19)
BASOPHILS # BLD AUTO: 0.03 10*3/UL (ref 0–0.2)
BASOPHILS NFR BLD AUTO: 0.5 % (ref 0–3)
BUN SERPL-MCNC: 12 MG/DL (ref 5–25)
BUN/CREAT SERPL: 18 {RATIO} (ref 6–20)
CALCIUM SERPL-MCNC: 8.4 MG/DL (ref 8.7–10.4)
CHLORIDE SERPL-SCNC: 103 MMOL/L (ref 99–111)
CONV ABS IMM GRAN: 0.03 10*3/UL (ref 0–0.2)
CONV CO2: 25 MMOL/L (ref 22–32)
CONV IMMATURE GRAN: 0.5 % (ref 0–1.8)
CREAT UR-MCNC: 0.65 MG/DL (ref 0.5–0.9)
DEPRECATED RDW RBC AUTO: 46.5 FL (ref 36.4–46.3)
EOSINOPHIL # BLD AUTO: 0.08 10*3/UL (ref 0–0.7)
EOSINOPHIL # BLD AUTO: 1.3 % (ref 0–7)
ERYTHROCYTE [DISTWIDTH] IN BLOOD BY AUTOMATED COUNT: 13.6 % (ref 11.7–14.4)
GFR SERPLBLD BASED ON 1.73 SQ M-ARVRAT: >60 ML/MIN/{1.73_M2}
GLUCOSE SERPL-MCNC: 125 MG/DL (ref 65–99)
HCT VFR BLD AUTO: 30.3 % (ref 37–47)
HGB BLD-MCNC: 9.7 G/DL (ref 12–16)
LYMPHOCYTES # BLD AUTO: 1.36 10*3/UL (ref 1–5)
LYMPHOCYTES NFR BLD AUTO: 22.4 % (ref 20–45)
MCH RBC QN AUTO: 30.1 PG (ref 27–31)
MCHC RBC AUTO-ENTMCNC: 32 G/DL (ref 33–37)
MCV RBC AUTO: 94.1 FL (ref 81–99)
MONOCYTES # BLD AUTO: 0.34 10*3/UL (ref 0.2–1.2)
MONOCYTES NFR BLD AUTO: 5.6 % (ref 3–10)
NEUTROPHILS # BLD AUTO: 4.22 10*3/UL (ref 2–8)
NEUTROPHILS NFR BLD AUTO: 69.7 % (ref 30–85)
NRBC CBCN: 0 % (ref 0–0.7)
OSMOLALITY SERPL CALC.SUM OF ELEC: 285 MOSM/KG (ref 273–304)
PLATELET # BLD AUTO: 138 10*3/UL (ref 130–400)
PMV BLD AUTO: 10 FL (ref 9.4–12.3)
POTASSIUM SERPL-SCNC: 3.3 MMOL/L (ref 3.5–5.3)
RBC # BLD AUTO: 3.22 10*6/UL (ref 4.2–5.4)
SARS-COV-2 RNA SPEC QL NAA+PROBE: NOT DETECTED
SODIUM SERPL-SCNC: 137 MMOL/L (ref 135–147)
WBC # BLD AUTO: 6.06 10*3/UL (ref 4.8–10.8)

## 2021-04-08 ENCOUNTER — OFFICE VISIT CONVERTED (OUTPATIENT)
Dept: ORTHOPEDIC SURGERY | Facility: CLINIC | Age: 66
End: 2021-04-08

## 2021-04-26 ENCOUNTER — TELEPHONE CONVERTED (OUTPATIENT)
Dept: FAMILY MEDICINE CLINIC | Facility: CLINIC | Age: 66
End: 2021-04-26
Attending: NURSE PRACTITIONER

## 2021-04-30 ENCOUNTER — OFFICE VISIT CONVERTED (OUTPATIENT)
Dept: FAMILY MEDICINE CLINIC | Facility: CLINIC | Age: 66
End: 2021-04-30
Attending: NURSE PRACTITIONER

## 2021-04-30 ENCOUNTER — HOSPITAL ENCOUNTER (OUTPATIENT)
Dept: FAMILY MEDICINE CLINIC | Facility: CLINIC | Age: 66
Discharge: HOME | End: 2021-04-30
Attending: NURSE PRACTITIONER

## 2021-04-30 LAB
25(OH)D3 SERPL-MCNC: 27.6 NG/ML (ref 30–100)
ALBUMIN SERPL-MCNC: 3.9 G/DL (ref 3.5–5)
ALBUMIN/GLOB SERPL: 1.3 {RATIO} (ref 1.4–2.6)
ALP SERPL-CCNC: 104 U/L (ref 43–160)
ALT SERPL-CCNC: 11 U/L (ref 10–40)
ANION GAP SERPL CALC-SCNC: 14 MMOL/L (ref 8–19)
AST SERPL-CCNC: 19 U/L (ref 15–50)
BASOPHILS # BLD AUTO: 0.07 10*3/UL (ref 0–0.2)
BASOPHILS NFR BLD AUTO: 1 % (ref 0–3)
BILIRUB SERPL-MCNC: 0.28 MG/DL (ref 0.2–1.3)
BUN SERPL-MCNC: 17 MG/DL (ref 5–25)
BUN/CREAT SERPL: 17 {RATIO} (ref 6–20)
CALCIUM SERPL-MCNC: 9.1 MG/DL (ref 8.7–10.4)
CHLORIDE SERPL-SCNC: 101 MMOL/L (ref 99–111)
CHOLEST SERPL-MCNC: 175 MG/DL (ref 107–200)
CHOLEST/HDLC SERPL: 2.5 {RATIO} (ref 3–6)
CONV ABS IMM GRAN: 0.02 10*3/UL (ref 0–0.2)
CONV CO2: 31 MMOL/L (ref 22–32)
CONV IMMATURE GRAN: 0.3 % (ref 0–1.8)
CONV TOTAL PROTEIN: 7 G/DL (ref 6.3–8.2)
CREAT UR-MCNC: 1.02 MG/DL (ref 0.5–0.9)
DEPRECATED RDW RBC AUTO: 50.4 FL (ref 36.4–46.3)
EOSINOPHIL # BLD AUTO: 0.13 10*3/UL (ref 0–0.7)
EOSINOPHIL # BLD AUTO: 1.9 % (ref 0–7)
ERYTHROCYTE [DISTWIDTH] IN BLOOD BY AUTOMATED COUNT: 14.4 % (ref 11.7–14.4)
GFR SERPLBLD BASED ON 1.73 SQ M-ARVRAT: 58 ML/MIN/{1.73_M2}
GLOBULIN UR ELPH-MCNC: 3.1 G/DL (ref 2–3.5)
GLUCOSE SERPL-MCNC: 104 MG/DL (ref 65–99)
HCT VFR BLD AUTO: 41.3 % (ref 37–47)
HDLC SERPL-MCNC: 71 MG/DL (ref 40–60)
HGB BLD-MCNC: 12.8 G/DL (ref 12–16)
LDLC SERPL CALC-MCNC: 80 MG/DL (ref 70–100)
LYMPHOCYTES # BLD AUTO: 2.48 10*3/UL (ref 1–5)
LYMPHOCYTES NFR BLD AUTO: 36.5 % (ref 20–45)
MCH RBC QN AUTO: 29.3 PG (ref 27–31)
MCHC RBC AUTO-ENTMCNC: 31 G/DL (ref 33–37)
MCV RBC AUTO: 94.5 FL (ref 81–99)
MONOCYTES # BLD AUTO: 0.45 10*3/UL (ref 0.2–1.2)
MONOCYTES NFR BLD AUTO: 6.6 % (ref 3–10)
NEUTROPHILS # BLD AUTO: 3.65 10*3/UL (ref 2–8)
NEUTROPHILS NFR BLD AUTO: 53.7 % (ref 30–85)
NRBC CBCN: 0 % (ref 0–0.7)
OSMOLALITY SERPL CALC.SUM OF ELEC: 296 MOSM/KG (ref 273–304)
PLATELET # BLD AUTO: 286 10*3/UL (ref 130–400)
PMV BLD AUTO: 9.7 FL (ref 9.4–12.3)
POTASSIUM SERPL-SCNC: 3.8 MMOL/L (ref 3.5–5.3)
RBC # BLD AUTO: 4.37 10*6/UL (ref 4.2–5.4)
SODIUM SERPL-SCNC: 142 MMOL/L (ref 135–147)
TRIGL SERPL-MCNC: 118 MG/DL (ref 40–150)
TSH SERPL-ACNC: 0.76 M[IU]/L (ref 0.27–4.2)
VIT B12 SERPL-MCNC: 324 PG/ML (ref 211–911)
VLDLC SERPL-MCNC: 24 MG/DL (ref 5–37)
WBC # BLD AUTO: 6.8 10*3/UL (ref 4.8–10.8)

## 2021-05-06 ENCOUNTER — OFFICE VISIT CONVERTED (OUTPATIENT)
Dept: ORTHOPEDIC SURGERY | Facility: CLINIC | Age: 66
End: 2021-05-06
Attending: PHYSICIAN ASSISTANT

## 2021-05-10 NOTE — OUTREACH NOTE
Quick Note      Patient Name: Annetta Malagon   Patient ID: [id number]   Sex: Female   YOB: 1955    Primary Care Provider: Sonia HENDRICKS   Referring Provider: Matt Hoover MD    Visit Date: August 26, 2020    Provider: ELADIO Grove   Location: [facility]   Location Address: [address] [place] [address] JUMA Reynolds  [id number]   Location Phone: [phone]          History Of Present Illness     [facility]     TaskID: [id number]    Task Subject: CCM notes August 2020    Task Comments:    Date: Aug 26 2020  1:18PM     Creator: ty  Follow up [facility] PCP this month was telehealth due to COVID 19 pandemic. She is to continue medications and added 1/2 Lasix 20mg, take 10mg daily for leg and ankle swelling, she is unable to do her compression socks so PCP recommends to use diabetic socks as easier to pull up. Refill on trazodone and return in 3 months. She also say orthopedics and she had a righ hip injection for pain and was instructed to f/u in 8 weeks with them and to use the compression sock for left ankle swelling per Dr. Bernice Salmeron. (chart review 7 minutes)    Date: Aug 10 2020  2:21PM     Creator: ty  Medication list updated per CCM call with patient. (5 minutes)    Date: Aug 10 2020  2:14PM     Creator: ty  (cont) scheduled and she would like to return to the doctor on [address] she saw previously like 7 years ago. Noted that would be Dr. Randell Hdz ([phone]) she will discuss that with Sonia. She did not have a follow up with PCP. She wants to wait 2 weeks, I scheduled her for a telehealth visit with Sonia for 8/24/2020. (26 minutes)    Date: Aug 10 2020  2:10PM     Creator: ty  I called anneent, she is doing pretty good, I woke her form a nap. We reviewed her medication list and noted she is not on any chew tablets of Vitamin Calrate with Vit D and said insurance probably would not pay for it. She would like to see if the Vit D3 she is on could be  sent in as script to see if insurance will cover it. Her Gabapentin is 800mg TID now and her Percocet is Q6h not BID. I will update med list. She is doing the Mobic form ortho and her plans are to have her hip done after October 23 due to her son is getting  in [facility] and she is flying down for the wedding. She reports she will have to go to rehab after her surgery because her sister will not take care of her. She took out her second bedroom from her house and took her couch bed out so no one could stay with her and her last surgery she had her sister only came by once to help her and she had to depend on her neighbor. We discussed her appoinment with Dr. Delvalle and he stated a referral to rheumatology. She reports that is not    Date: Aug 10 2020  1:35PM     Creator: ty  (cont) preventtive care. (chart review of specialists visits 14 minutes)    Date: Aug 10 2020  1:34PM     Creator: ty  Noted per chart review patient had an appointment with orhto begining of the month for right hip, right knee, and left ankle pain and she is going to start out doing injections and anti-inflammatory medications and consider a hip replacmenet surgery. She was given home exercises for her ankle and will take the anti-inflammatory for this as well. She saw Dr. Delvalle on 6/30/2020 for extrimity weakness and work up shows carpl tunnel sydrome and brace given to wear on left wrist for left hand. Recommended for referral to orthopedic surgery for carpal tunnel surgery. For gait disturbance he said unlikely to neuroloic disease and most likely musculoskeletal pain and joint pain he is getting B12 levels and recommend referring her for rheumatology to look for other etiologies for her myalgias such as systemic rheumatological diseases. Nerve conduction study of lower extrimities was unremarkable and back pain reviewed MRI and is unremarkable and no spinal stenosis. Recommended she follow up with PCP for                    Electronically Signed by: Sahara Zarate RN -Author on August 26, 2020 01:29:24 PM

## 2021-05-10 NOTE — H&P
History and Physical      Patient Name: Annetta Malagon   Patient ID: [id number]   Sex: Female   YOB: 1955    Primary Care Provider: Sonia HENDRICKS   Referring Provider: Matt Hoover MD    Visit Date: November 11, 2020    Provider: Matt Hoover MD   Location: [facility]   Location Address: [address] [place] [address]  [id number]   Location Phone: [phone]          Chief Complaint  · Outpatient History & Physical / Surgical Orders  · Back and Buttock nodules      History Of Present Illness  Annetta Malagon is a 64 year old /White female who presents today for evaluation of nodules and masses in her lower back and buttocks. Ms. Malagon is very well known to me. I have excised multiple lipomas in this area on multiple occasions. She has, what she feels like, are recurrence in some areas and new lipomas in other areas. These are tender to palpation. She doesn't note any signs of infection.       Past Medical History  Anxiety; Arthritis; Bipolar disorder; Bladder Disorder; Broken Bones; Chronic pain; Congestive heart failure; Depression; Diabetes Mellitus, Type II; GERD (gastroesophageal reflux disease); Hemorrhoids; Hyperlipemia; Hyperlipidemia; Hypertension; Hypothyroid; Insomnia; Insomnia, unspecified; Kidney calculus; Kidney Disease; Limb Swelling; Lung disease; Major depressive disorder; Osteoarthritis; Osteopenia; Reflux; Reflux Disease; Thyroid Problems; Varicose veins of both lower extremities; Vitamin D deficiency         Past Surgical History  Ankle repair; Appendectomy; Cholecystectomy; Colonoscopy; Gallbladder; Hemorrhoidectomy; Hip Surgery; Hysterectomy; Kidney; Surgical Clips; Tonsillectomy; Tubal ligation         Medication List  baclofen 10 mg oral tablet; cimetidine 400 mg oral tablet; Crestor 5 mg oral tablet; gabapentin 800 mg oral tablet; hydroxyzine HCl 50 mg oral tablet; Lasix 20 mg oral tablet; Mobic 15 mg oral tablet;  Nexium 40 mg oral capsule,delayed release(DR/EC); Percocet  mg oral tablet; trazodone 150 mg oral tablet; Vitamin D3 5,000 unit oral tablet; Voltaren 1 % topical gel         Allergy List  Cymbalta       Allergies Reconciled  Family Medical History  Liver Neoplasm, Malignant; Lung Neoplasm, Malignant; Ovary Neoplasm, Malignant; Brain Neoplasm, Benign; Stroke; Heart Disease; Cancer, Unspecified; Diabetes, unspecified type; Colon Cancer; Alzheimer's Disease; Bone Neoplasm, Malignant; Family history of certain chronic disabling diseases; arthritis; Osteoporosis; Family history of Arthritis         Social History  Alcohol Use; Claustophobic (Unknown); .; lives alone; Other Substance Use (Never); Recreational Drug Use (Never); Retired.; Single.; Tobacco (Former)         Immunizations  Name Date Admin   Influenza 09/17/2018   Influenza 10/20/2014   Influenza 11/11/2013   Pneumococcal 10/20/2014   [id number] 11/15/2019         Review of Systems  · Constitutional  o Denies  o : chills, excessive sweating, fatigue, fever, sycope/passing out, weight gain, weight loss  · Eyes  o Denies  o : changes in vision, blurry vision, double vision  · HENT  o Denies  o : loss of hearing, ringing in the ears, ear aches, sore throat, nasal congestion, sinus pain, nose bleeds, seasonal allergies  · Cardiovascular  o Denies  o : blood clots, swollen legs, anemia, easy burising or bleeding, transfusions  · Respiratory  o Denies  o : shortness of breath, dry cough, productive cough, pneumonia, COPD  · Gastrointestinal  o Denies  o : difficulty swallowing, reflux  · Genitourinary  o Denies  o : incontinence  · Neurologic  o Denies  o : headache, seizure, stroke, tremor, loss of balance, falls, dizziness/vertigo, difficulty with sleep, numbness/tingling/paresthesia , difficulty with coordination, difficulty with dexterity, weakness  · Musculoskeletal  o Denies  o : neck stiffness/pain, swollen lymph nodes, muscle aches, joint pain,  "weakness, spasms, sciatica, pain radiating in arm, pain radiating in leg, low back pain  · Endocrine  o Denies  o : diabetes, thyroid disorder  · Psychiatric  o Denies  o : anxiety, depression      Vitals  Date Time BP Position Site L\R Cuff Size HR RR TEMP (F) WT  HT  BMI kg/m2 BSA m2 O2 Sat FR L/min FiO2 HC       11/11/2020 03:13 PM         170lbs 2oz 5'  6\" 27.46 1.9             Physical Examination  · Constitutional  o Appearance  o : well developed, well-nourished, alert and in no acute distress  · Head and Face  o Head  o :   § Inspection  § : no deformities or lesions  · Eyes  o Conjunctivae  o : clear  o Sclerae  o : clear  · Neck  o Inspection/Palpation  o : normal appearance, no masses or tenderness, trachea midline  · Respiratory  o Respiratory Effort  o : breathing unlabored  o Inspection of Chest  o : normal appearance, no retractions  · Cardiovascular  o Heart  o : regular rate and rhythm  · Gastrointestinal  o Abdominal Examination  o : abdomen is soft.   · Lymphatic  o Neck  o : no lymphadenopathy present  o Axilla  o : no lymphadenopathy present  o Groin  o : no lymphadenopathy present  · Skin and Subcutaneous Tissue  o General Inspection  o : along her lower back and buttock, she has multiple small, ranging from 1.0-3.0 cm, lesions that are consistent with her previous lipomas.   · Neurologic  o Cranial Nerves  o : grossly intact  o Sensation  o : grossly intact  o Gait and Station  o :   § Gait Screening  § : normal gait, able to stand without diffculty  o Cerebellar Function  o : no obvious abnormalities  · Psychiatric  o Judgement and Insight  o : judgment and insight intact  o Mood and Affect  o : mood normal, affect appropriate          Assessment  · Pre-Surgical Orders     V72.84  · Pre-op testing     V72.84/Z01.818  · Buttocks nodule     782.2/R22.2  · Nodule of skin of back     782.2/R22.2       Patient with multiple lower back lipomas.       Plan  · Orders  o General Surgery Order (GENOR) " - 782.2/R22.2 - 12/04/2020  o [facility] [place] (69636) - V72.84/Z01.818 - 11/30/2020   [facility] [address] thru on 11/30/20 at 830AM at [facility]  · Medications  o Medications have been Reconciled  o Transition of Care or Provider Policy  · Instructions  o PLAN:   o Handouts Provided-Pre-Procedure Instructions including date and time and location of procedure.  o Surgical Facility: [facility]  o ****Patient Status****  o Outpatient  o ********************  o RISK AND BENEFITS:  o Consent for surgery: Given these options, the patient has verbally expressed an understanding of the risks of surgery and finds these risks acceptable. We will proceed with surgery as soon as possible.  o Consult Anesthesia for any post-operative block, or any pain management procedure deemed necessary by the anestesiologist for adequate post-operative pain control.   o O.R. PREP: Per protocol  o IV: Per Anesthesia  o IV: LR@ 75ml/hr  o PLEASE SIGN PERMIT FOR: Excision of multiply lower back and buttock lesions (1-3CM)  o *__Kefzol 2 gram IV on call to OR.  o *___The above History and Physical Examination has been completed within 30 days of admission.  o Pre-Admission Testing Date: PAT PHONE 11/27/20 at 130PM  o Electronically Identified Patient Education Materials Provided Electronically     As previously, when these pop up and become symptomatic, we will plan for excision. The patient is very aware, as we have had multiple occasions of this procedure. Risks, benefits, and alternatives were discussed with the patient extensively. All questions were answered. The patient voiced understanding and agrees to proceed with the above plan.             Electronically Signed by: Sara Mathews-, -Author on November 13, 2020 01:56:55 PM  Electronically Co-signed by: Matt Hoover MD -Reviewer on November 13, 2020 02:17:36 PM

## 2021-05-10 NOTE — H&P
History and Physical      Patient Name: Annetta Malagon   Patient ID: [id number]   Sex: Female   YOB: 1955    Primary Care Provider: Sonia HENDRICKS   Referring Provider: Matt Hoover MD    Visit Date: August 4, 2020    Provider: Merna Mendoza MD   Location: [facility]   Location Address: [address] [place] [address]  [id number]   Location Phone: [phone]          Chief Complaint  · Right Hip Pain  · Right Knee Pain  · Left Ankle Pain      History Of Present Illness  Annetta Malagon is a 64 year old /White female who presents today to [facility].      The patient presents today for evaluation of right knee, right hip and left ankle. Patient reports she has been having pain of the hip and has been having multiple falls causing her increased ankle pain. She believes she is falling due to the hip. She reports groin pain of the bilateral sides, with lateral sided hip pain and right knee pain.  Patient reports a previous fracture of the hip and notices a leg length discrepancy. Patient has been taking osteoporosis medications.                 Past Medical History  Anxiety; Arthritis; Bipolar disorder; Bladder Disorder; Broken Bones; Chronic pain; Congestive heart failure; Depression; Diabetes Mellitus, Type II; GERD (gastroesophageal reflux disease); Hemorrhoids; Hyperlipemia; Hyperlipidemia; Hypertension; Hypothyroid; Insomnia; Insomnia, unspecified; Kidney calculus; Kidney Disease; Limb Swelling; Lung disease; Major depressive disorder; Osteoarthritis; Osteopenia; Reflux; Reflux Disease; Thyroid Problems; Varicose veins of both lower extremities; Vitamin D deficiency         Past Surgical History  Ankle repair; Appendectomy; Cholecystectomy; Colonoscopy; Gallbladder; Hemorrhoidectomy; Hip Surgery; Hysterectomy; Kidney; Surgical Clips; Tonsillectomy; Tubal ligation         Medication List  baclofen 10 mg oral tablet; Caltrate 600 + D 600 mg (1,500  "mg)-800 unit oral tablet,chewable; cimetidine 400 mg oral tablet; Crestor 5 mg oral tablet; gabapentin oral; hydroxyzine HCl 50 mg oral tablet; Mobic 15 mg oral tablet; Nexium 40 mg oral capsule,delayed release(DR/EC); Percocet  mg oral tablet; trazodone 150 mg oral tablet; Vitamin D3 5,000 unit oral tablet         Allergy List  Cymbalta       Allergies Reconciled  Family Medical History  Liver Neoplasm, Malignant; Lung Neoplasm, Malignant; Ovary Neoplasm, Malignant; Brain Neoplasm, Benign; Stroke; Heart Disease; Cancer, Unspecified; Diabetes, unspecified type; Colon Cancer; Alzheimer's Disease; Bone Neoplasm, Malignant; Family history of certain chronic disabling diseases; arthritis; Osteoporosis; Family history of Arthritis         Social History  Alcohol (Never); Alcohol Use (Current some day); Claustophobic (Unknown); .; lives alone; Other Substance Use (Never); Recreational Drug Use (Never); Retired.; Single.; Tobacco (Former)         Review of Systems  · Constitutional  o Denies  o : fever, chills, weight loss  · Cardiovascular  o Denies  o : chest pain, shortness of breath  · Gastrointestinal  o Denies  o : liver disease, heartburn, nausea, blood in stools  · Genitourinary  o Denies  o : painful urination, blood in urine  · Integument  o Denies  o : rash, itching  · Neurologic  o Denies  o : headache, weakness, loss of consciousness  · Musculoskeletal  o Denies  o : painful, swollen joints  · Psychiatric  o Denies  o : drug/alcohol addiction, anxiety, depression      Vitals  Date Time BP Position Site L\R Cuff Size HR RR TEMP (F) WT  HT  BMI kg/m2 BSA m2 O2 Sat        08/04/2020 08:11 AM      82 - R   162lbs 0oz 5'  5\" 26.96 1.84 97 %          Physical Examination  · Constitutional  o Appearance  o : well developed, well-nourished, no obvious deformities present  · Head and Face  o Head  o :   § Inspection  § : normocephalic  o Face  o :   § Inspection  § : no facial " lesions  · Eyes  o Conjunctivae  o : conjunctivae normal  o Sclerae  o : sclerae white  · Ears, Nose, Mouth and Throat  o Ears  o :   § External Ears  § : appearance within normal limits  § Hearing  § : intact  o Nose  o :   § External Nose  § : appearance normal  · Neck  o Inspection/Palpation  o : normal appearance  o Range of Motion  o : full range of motion  · Respiratory  o Respiratory Effort  o : breathing unlabored  o Inspection of Chest  o : normal appearance  o Auscultation of Lungs  o : no audible wheezing or rales  · Cardiovascular  o Heart  o : regular rate  · Gastrointestinal  o Abdominal Examination  o : soft and non-tender  · Skin and Subcutaneous Tissue  o General Inspection  o : intact, no rashes  · Psychiatric  o General  o : Alert and oriented x3  o Judgement and Insight  o : judgment and insight intact  o Mood and Affect  o : mood normal, affect appropriate  · Extremities  o Extremities  o : Ambulates with an antalgic gait with a cane, uses walker at home, good hip flexion, limited IR and ER, pain with ER and IR, mild pain with ankle ROM, some pain with weight bearing, sensation grossly intact, Neurovascularly intact   · Injection Note/Aspiration Note  o Site  o : IM  o Procedure  o : Procedure: After educating the patient, patient gave consent for the procedure. After using alcohol prep, injection was given. The patient tolerated the procedure well.   o Medication  o : 2ml's of 4 mg Dexamethasone   · In Office Procedures  o View  o : LAT/SUNRISE/STANDING   o Site  o : right, knee, right, hip, left, ankle  o Indication  o : Right Knee Pain, Right HIp Pain, Left Ankle Pain  o Study  o : X-rays ordered, taken in the office, and reviewed today.  o Xray  o : Right hip advanced degenerative changes; right knee reveals moderate degenerative changes; left ankle reveals mild osteoarthritis   o Comparative Data  o : No comparative data found          Assessment  · Primary osteoarthritis of  hip     715.15/M16.10  · Primary osteoarthritis of right knee     715.16/M17.11  · Left ankle pain     719.47/M25.572  · Right Pain: Hip     719.45/M25.559  · Right knee pain, unspecified chronicity     719.46/M25.561  · Ankle sprain     845.00/S93.409A      Plan  · Orders  o 2.00 - Dexamethasone Injection 8mg (-1) - 715.15/M16.10 - 08/04/2020   Lot [id number] Exp 02 2021 [facility] Administered by KAYCE Bello MA  o IM/SQ - Injection Fee [facility] ([id number]) - 715.15/M16.10 - 08/04/2020  o Ankle (Left) [facility] ([id number]) - 719.47/M25.572 - 08/04/2020  o Hip (Right) 2 or more views (includes AP Pelvis) [facility]. ([id number]) - 719.45/M25.559 - 08/04/2020  o Knee (Right) [facility] ([id number]) - 719.46/M25.561 - 08/04/2020  · Medications  o Medications have been Reconciled  o Transition of Care or Provider Policy  · Instructions  o Reviewed the patient's Past Medical, Social, and Family history as well as the ROS at today's visit, no changes.  o Call or return if worsening symptoms.  o Exercise handout given.  o X-ray ordered, taken and reviewed at this visit.  o The above service was scribed by Audelia Moreira on my behalf and I attest to the accuracy of the note. mc  o As for her hip, we discussed arthritis medication, injections and a future hip replacement. We discussed a hip replacement, she wished to proceed with an injection and anti-inflammatory medication and consider a replacement surgery. For her ankle, she was given home exercises and will take the anti-inflammatory medication for this as well.   o Electronically Identified Patient Education Materials Provided Electronically            Electronically Signed by: Audelia Moreira-, Other -Author on August 4, 2020 01:00:11 PM  Electronically Co-signed by: Merna Mendoza MD -Reviewer on August 4, 2020 04:10:30 PM

## 2021-05-10 NOTE — OUTREACH NOTE
Quick Note      Patient Name: Annetta Malagon   Patient ID: [id number]   Sex: Female   YOB: 1955    Primary Care Provider: Sonia HENDRICKS   Referring Provider: Matt Hoover MD    Visit Date: December 31, 2020    Provider: ELADIO Grove   Location: [facility]   Location Address: [address] [place], [place] [address], [place]  [id number]   Location Phone: [phone]          History Of Present Illness  CCM Comprehensive Care Plan    This Chronic Medical Management Care Plan for Annetta Malagon, 65 year old /White female, has been monitored and managed, reviewed, revised, and and a new plan of care implemented for the month of December. A cumulative time of 43 minutes was spent on this patient record, including face to face with provider, phone call with patient, electronic communication with primary care provider, and chart review.   Regarding the patient's diagnoses Anxiety, Hypertension, Major depressive disorder, Osteopenia, and lipoma of skin, the following items were adressed: medical records, medications, and changes to medical care and any changes can be found within the plan section of the note. A detailed listing of time spent for chronic care management has been scanned into the patient's electronic record. Current medications include: baclofen 10 mg oral tablet, cimetidine 400 mg oral tablet, Crestor 5 mg oral tablet, gabapentin 800 mg oral tablet, hydroxyzine HCl 50 mg oral tablet, Lasix 20 mg oral tablet, Lexapro 20 mg oral tablet, Mobic 15 mg oral tablet, Nexium 40 mg oral capsule,delayed release(DR/EC), Percocet  mg oral tablet, trazodone 150 mg oral tablet, Vitamin D3 5,000 unit oral tablet, and Voltaren 1 % topical gel and the patient is reported to be compliant with medication protocol. Medications are reported to be non-effective in controlling symptoms and changes have been made to the medication protocol. Regarding these  diagnoses, continued follow up appointments with general surgery Dr. Mayes and with Dr. Cody Varghese and she will do lab work this month   The patient was monitored remotely for weight, activity level, and mood and behavior for a period of 6 minutes.   This patient's physical needs are currently being met.   Patient's mental support needs include: continued support. Changed to Seroquel and doing well with this, no SI/HI and patient reports she is feeling good with her current medications.   The patient's cognitive support needs are currently being met.   The patient's psychosocial support needs include:, continued support and coordination of community providers. PCP requesting lab work and patient is reporting she will have labs at her follow up with Dr. Varghese this month, will get reports for continuity of care and op notes scanned in for PCP t review from Dr. Mayes.   This patient's functional needs are N/A.   This patient's environmental needs are N/A.           Assessment  · Chronic Care Management (CCM)     V68.89/Z02.89  · Anxiety     300.02/F41.1  · Chronic pain     338.29/G89.29  · GERD (gastroesophageal reflux disease)     530.81/K21.9  · Insomnia, unspecified     780.52/G47.00  · Major depressive disorder     296.20/F32.2  · Lipoma of back     214.8/D17.1      Plan  · Orders  o CCM:Each additional 20 mins () - V68.89/Z02.89, 300.02/F41.1 - 12/07/2020  · Medications  o Medications have been Reconciled  o Transition of Care or Provider Policy  · Instructions  o Patient's Health Care Goals: To get over this last surgery for multiple lipomas removed and to see Dr. Varghese, have gained back some weight.   o Provider's Health Care Goals: To continue to see specialists and to have up to date lab work done.   o Patient was provided an electronic copy of care plan  o CCM services were explained and offered and patient has accepted these services.  o Patient has given their written consent to recieve  CCM services and understands that this includes the authorization of electronic communication of medical information with other treating providers.  o Patient understands that they may stop CCM services at any time and these changes will be effective at the end of the calendar month and will effectively revocate the agreement of CCM services.  o Patient understands that only one practioner can furnish and be paid for CCM services during one calendar month. Patient also understands that there may be co-payment or deductible fees in association with CCM services.  o Patient will continue with at least monthly follow-up calls with the Nurse Navigator.  · Associate Tasks  o Task ID [id number] CCM: CCM notes December 2020            Electronically Signed by: Sahara Zarate RN -Author on December 31, 2020 03:46:42 PM

## 2021-05-10 NOTE — OUTREACH NOTE
Quick Note      Patient Name: Annetta Malagon   Patient ID: [id number]   Sex: Female   YOB: 1955    Primary Care Provider: Sonia HENDRICKS   Referring Provider: Matt Hoover MD    Visit Date: February 26, 2021    Provider: ELADIO Grove   Location: [facility]   Location Address: [address] [place] [address]  [id number]   Location Phone: [phone]          History Of Present Illness  CCM Comprehensive Care Plan    This Chronic Medical Management Care Plan for Annetta Malagon, 65 year old /White female, has been monitored and managed, reviewed, revised, and completed for the month of February. A cumulative time of 37 minutes was spent on this patient record, including face to face with provider, phone call with patient, and chart review.   Regarding the patient's diagnoses Hyperlipidemia, Hypertension, and Major depressive disorder, the following items were adressed: medical records and medications and any changes can be found within the plan section of the note. A detailed listing of time spent for chronic care management has been scanned into the patient's electronic record. Current medications include: ammonium lactate 12 % topical cream, baclofen 10 mg oral tablet, cimetidine 400 mg oral tablet, Crestor 5 mg oral tablet, Flonase Allergy Relief 50 mcg/actuation nasal spray,suspension, gabapentin 800 mg oral tablet, hydroxyzine HCl 50 mg oral tablet, Lasix 20 mg oral tablet, Lexapro 20 mg oral tablet, Mobic 15 mg oral tablet, Nexium 40 mg oral capsule,delayed release(DR/EC), Percocet  mg oral tablet, trazodone 150 mg oral tablet, Vitamin D3 5,000 unit oral tablet, and Voltaren 1 % topical gel and the patient is reported to be compliant with medication protocol. Medications are reported to be effective.   The patient was monitored remotely for blood pressure and activity level for a period of 4 minutes.   This patient's  physical needs are currently being met.   Patient's mental support needs include: contact information. Patient well managed with depression on current medication and continue to refill with PCP and no needs for therapy at this time, she is aware to follow up with PCP as needed for any increase or issues with her depression and anxiety.   The patient's cognitive support needs are currently being met.   The patient's psychosocial support needs include:, contact information. Patient will continue to see specialists as needed but most chronic conditions controlled per PCP and she is aware no longer needs CCM program as all goals have been reached and well control of her conditions.   This patient's functional needs are N/A.   This patient's environmental needs are N/A.           Assessment  · Chronic Care Management (CCM)     V68.89/Z02.89  · Anxiety     300.02/F41.1  · Major depressive disorder     296.20/F32.2  · Osteoarthritis     715.90/M19.90  · Hyperlipidemia     272.4/E78.5  · Hypertension     401.9/I10      Plan  · Instructions  o Patient's Health Care Goals: Consented for DM, blood pressure, and lipids, and depression. All are well managed at this time. Her goals are met.  o Provider's Health Care Goals: Patient is aware to continue to see any specialists and to have her blood work done and she has orders from specialists to do these per her report.   o Patient was provided an electronic copy of care plan  o CCM services were explained and offered and patient has accepted these services.  o Patient has given their written consent to recieve CCM services and understands that this includes the authorization of electronic communication of medical information with other treating providers.  o Patient understands that they may stop CCM services at any time and these changes will be effective at the end of the calendar month and will effectively revocate the agreement of CCM services.  o Patient understands that only  one practioner can furnish and be paid for CCM services during one calendar month. Patient also understands that there may be co-payment or deductible fees in association with CCM services.  o Patient will continue with at least monthly follow-up calls with the Nurse Navigator.  · Associate Tasks  o Task ID [id number] CCM: CCM notes february 2021     PCP aware need for AWV per task note, as last was 11/15/19 and bone density and mammogram to be ordered, colonoscopy was 2019 but showing on ACO GAP.             Electronically Signed by: Sahara Zarate RN -Author on February 26, 2021 08:09:36 AM

## 2021-05-10 NOTE — H&P
History and Physical      Patient Name: Annetta Malagon   Patient ID: [id number]   Sex: Female   YOB: 1955    Primary Care Provider: Sonia HENDRICKS   Referring Provider: Sonia HENDRICKS    Visit Date: June 30, 2020    Provider: Layton Delvalle MD   Location: [facility]   Location Address: [address] [place] [address]  [id number]   Location Phone: [phone]          Chief Complaint     New pt visit for extremity weakness.       History Of Present Illness  Annetta Malagon is a 64 year old /White female who presents today to [facility] today referred from Sonia HENDRICKS.      64-year-old woman evaluated for numbness in her hands and legs for the last 6 years.  She states that she has numbness in the back of her legs that is there all the time.  She states that there is also pain and walking makes it feel better.  She has to stretch her legs.  She states that it could be worse at night as well.  She does not believe she has restless leg syndrome.  She states that she has been treated for restless leg syndrome and the medications did not work.  She is had an MRI of the lumbar spine which I reviewed the images which are unremarkable.  She is also had an MRI of the brain which is unremarkable.  She is complaining of her hands being intermittently numb for the last 2 years.  She states that she drops things.  She states that the numbness and tingling last for 2 minutes to 10 minutes.  It occurs 2-5 times a day.  It also happens at night.  She was is sent here by a surgeon.       Past Medical History  Anxiety; Arthritis; Bipolar disorder; Bladder Disorder; Broken Bones; Chronic pain; Depression; Diabetes Mellitus, Type II; GERD (gastroesophageal reflux disease); Hemorrhoids; Hyperlipemia; Hyperlipidemia; Hypertension; Hypothyroid; Insomnia; Insomnia, unspecified; Kidney calculus; Limb Swelling; Lung disease; Major depressive  disorder; Osteoarthritis; Osteopenia; Reflux; Reflux Disease; Thyroid Problems; Varicose veins of both lower extremities; Vitamin D deficiency         Past Surgical History  Ankle repair; Appendectomy; Cholecystectomy; Colonoscopy; Hemorrhoidectomy; Hip Surgery; Hysterectomy; Kidney; Tonsillectomy; Tubal ligation         Medication List  baclofen 10 mg oral tablet; Caltrate 600 + D 600 mg (1,500 mg)-800 unit oral tablet,chewable; cimetidine 400 mg oral tablet; Crestor 5 mg oral tablet; gabapentin oral; hydroxyzine HCl 50 mg oral tablet; Nexium 40 mg oral capsule,delayed release(DR/EC); Percocet  mg oral tablet; trazodone 150 mg oral tablet; Vitamin D3 5,000 unit oral tablet         Allergy List  Cymbalta         Family Medical History  Liver Neoplasm, Malignant; Lung Neoplasm, Malignant; Ovary Neoplasm, Malignant; Brain Neoplasm, Benign; Heart Disease; Cancer, Unspecified; Diabetes, unspecified type; Colon Cancer; Alzheimer's Disease; Bone Neoplasm, Malignant; Family history of certain chronic disabling diseases; arthritis; Osteoporosis; Family history of Arthritis         Social History  Alcohol (Never); .; lives alone; Other Substance Use (Never); Recreational Drug Use (Never); Retired.; Single.; Tobacco (Former)         Immunizations  Name Date Admin   Influenza    Influenza    Influenza    Pneumococcal    [id number]          Review of Systems  · Constitutional  o Denies  o : chills, excessive sweating, fatigue, fever, sycope/passing out, weight gain, weight loss  · Eyes  o Denies  o : changes in vision, blurry vision, double vision  · HENT  o Denies  o : loss of hearing, ringing in the ears, ear aches, sore throat, nasal congestion, sinus pain, nose bleeds, seasonal allergies  · Cardiovascular  o Denies  o : blood clots, swollen legs, anemia, easy burising or bleeding, transfusions  · Respiratory  o Denies  o : shortness of breath, dry cough, productive cough, pneumonia,  "COPD  · Gastrointestinal  o Denies  o : difficulty swallowing, reflux  · Genitourinary  o Denies  o : incontinence  · Neurologic  o Denies  o : headache, seizure, stroke, tremor, loss of balance, falls, dizziness/vertigo, difficulty with sleep, numbness/tingling/paresthesia , difficulty with coordination, difficulty with dexterity, weakness  · Musculoskeletal  o Denies  o : neck stiffness/pain, swollen lymph nodes, muscle aches, joint pain, weakness, spasms, sciatica, pain radiating in arm, pain radiating in leg, low back pain  · Endocrine  o Denies  o : diabetes, thyroid disorder  · Psychiatric  o Denies  o : anxiety, depression      Vitals  Date Time BP Position Site L\R Cuff Size HR RR TEMP (F) WT  HT  BMI kg/m2 BSA m2 O2 Sat HC       06/30/2020 08:09 AM        97.3         06/30/2020 08:21 /75 Sitting    77 - R   162lbs 0oz 5'  5\" 26.96 1.84           Physical Examination     She is alert, fluent, phasic, follows commands well.  Optic disks are normal, visual fields full, EOMs full directions gaze, facial strength is full.  On motor examination there is no weakness of the upper or lower extremities in these muscle testing.  Fine finger movements are intact.  There is no weakness proximally and distally.  Reflexes are decreased in the biceps, triceps, patellar's and ankles.  Sensation is symmetrical to pinprick in the lower extremities.  No sensory level in the back.  Vibration is impaired higher than the ankles.  Station gait she is able to tiptoe, heel walk, tessa and tandem and able to tandem 4 steps before she was her balance.  Phalen sign is positive in the left hand.           Assessment  · Carpal tunnel syndrome     354.0/G56.00  I have given her wrist brace to wear in the left hand. If her symptoms do not improve in the next month I would recommend for her to be referred to orthopedic surgery for carpal tunnel surgery.  · Gait disturbance     781.2/R26.9  Her gait disturbance is unlikely secondary " to neurologic disease. Her pain is likely from musculoskeletal pain and joint pain. She has been using a cane for the last several years and she has leg pain which is most likely related to joint pain. I will obtain B12 levels. I would recommend for her to be referred to rheumatology to look for other etiologies for her myalgias such as systemic rheumatological diseases.  · Numbness and tingling       Anesthesia of skin     782.0/R20.0  Paresthesia of skin     782.0/R20.2  The study is abnormal and shows electrophysiologic evidence for moderate to severe carpal tunnel syndrome on the left hand and moderate on the right hand.    Nerve conduction study of the lower extremities is unremarkable. There is no evidence for polyneuropathy.    EMG study is unremarkable and does not show electrophysiologic evidence for lumbosacral radiculopathy involving the L2-S1 ventral nerve roots. There is no evidence of denervation, reinnervation. There is no evidence of myopathy.  · Back pain     724.5/M54.9  I reviewed the MRI of the lumbar spine which is unremarkable. EMG studies unremarkable as well. I told her that she does not have spinal stenosis.    Problems Reconciled  Plan  · Orders  o Vitamin B12 (cyanocobalamin) (34764) - 354.0/G56.00, 781.2/R26.9, 782.0/R20.0, 782.0/R20.2 - 06/30/2020  o Muscle test done with Nerve test Complete (22354) - 354.0/G56.00, 781.2/R26.9, 782.0/R20.2 - 06/30/2020  o Nerve conduction studies; 13 or more studies (27501) - 354.0/G56.00, 781.2/R26.9, 782.0/R20.0, 724.5/M54.9 - 06/30/2020  · Medications  o Medications have been Reconciled  o Transition of Care or Provider Policy  · Instructions  o Encouraged to follow-up with Primary Care Provider for preventative care.            Electronically Signed by: Layton Delvalle MD -Author on June 30, 2020 10:16:35 AM

## 2021-05-10 NOTE — OUTREACH NOTE
Quick Note      Patient Name: Annetta Malagon   Patient ID: [id number]   Sex: Female   YOB: 1955    Primary Care Provider: Sonia HENDRICKS   Referring Provider: Matt Hoover MD    Visit Date: August 26, 2020    Provider: ELADIO Grove   Location: [facility]   Location Address: [address] [place] [address] JUMA Reynolds  [id number]   Location Phone: [phone]          History Of Present Illness  CCM Comprehensive Care Plan    This Chronic Medical Management Care Plan for Annetta Malagon, 64 year old /White female, has been monitored and managed, reviewed, and revised for the month of August. A cumulative time of 52 minutes was spent on this patient record, including face to face with provider, phone call with patient, electronic communication with other providers, and chart review.   Regarding the patient's diagnoses Anxiety, Chronic pain, Hyperlipidemia, Hypertension, Major depressive disorder, and Osteoarthritis, the following items were adressed: medical records, medications, and changes to medical care and any changes can be found within the plan section of the note. A detailed listing of time spent for chronic care management has been scanned into the patient's electronic record. Current medications include: baclofen 10 mg oral tablet, cimetidine 400 mg oral tablet, Crestor 5 mg oral tablet, gabapentin 800 mg oral tablet, hydroxyzine HCl 50 mg oral tablet, Lasix 20 mg oral tablet, Mobic 15 mg oral tablet, Nexium 40 mg oral capsule,delayed release(DR/EC), Percocet  mg oral tablet, trazodone 150 mg oral tablet, and Vitamin D3 5,000 unit oral tablet and the patient is reported to be compliant with medication protocol. Medications are reported to be effective. Regarding these diagnoses, patient has seen orthopedicsstiven for hip pain and ankle pain and Dr. Delvalle for weakness. She is recommended for a hip replacement and a referral to rheumatology  per Dr. Delvalle recommendations.   The patient was monitored remotely for activity level and pain level for a period of 5 minutes.   This patient's physical needs include: DME supplies and. Continues to use cane for ambulation as needed for safety and pain.   Patient's mental support needs include: continued support and. Continue current medications with Trazodone and she has a good relationship with her friend/neighbor. Her and her sister are not very close any more and she cannot depend on her for support when needed.   The patient's cognitive support needs are currently being met.   The patient's psychosocial support needs include:, continued support and coordination of community providers. Continue Trazadone for depression and sleep. Notes reviewed from specialists and plan of care noted.   This patient's functional needs are N/A.   This patient's environmental needs are N/A.           Assessment  · Chronic Care Management (CCM)     V68.89/Z02.89  · Anxiety     300.02/F41.1  · Chronic pain     338.29/G89.29  · Major depressive disorder     296.20/F32.2  · Osteoarthritis     715.90/M19.90  · Hypertension     401.9/I10      Plan  · Orders  o CCM:Each additional 20 mins () - V68.89/Z02.89, 300.02/F41.1, 338.29/G89.29, 296.20/F32.2 - 08/10/2020  · Instructions  o Patient's Health Care Goals: To continue to see orthopedics until I can get my hip surgery and I want to put that off until my son gets  later this year.   o Provider's Health Care Goals: Control swelling in legs and ankles with low dose of Lasix and to use diabetic socks for some compression management. Follow up in 3 months.  o Patient was provided an electronic copy of care plan  o CCM services were explained and offered and patient has accepted these services.  o Patient has given their written consent to recieve CCM services and understands that this includes the authorization of electronic communication of medical information with other  treating providers.  o Patient understands that they may stop CCM services at any time and these changes will be effective at the end of the calendar month and will effectively revocate the agreement of CCM services.  o Patient understands that only one practioner can furnish and be paid for CCM services during one calendar month. Patient also understands that there may be co-payment or deductible fees in association with CCM services.  o Patient will continue with at least monthly follow-up calls with the Nurse Navigator.  · Associate Tasks  o Task ID [id number] CCM: CCM notes August 2020            Electronically Signed by: Sahara Zarate RN -Author on August 26, 2020 01:28:37 PM

## 2021-05-10 NOTE — H&P
History and Physical      Patient Name: Annetta Malagon   Patient ID: [id number]   Sex: Female   YOB: 1955    Primary Care Provider: Sonia HENDRICKS   Referring Provider: Sonia HENDRICKS    Visit Date: May 6, 2020    Provider: Matt Hoover MD   Location[facility]   Location Address: [address] [place] [address]  [id number]   Location Phone: [phone]          Chief Complaint  · Outpatient History & Physical / Surgical Orders  · Follow Up  · Back Lesions      History Of Present Illness  Annetta Malagon is a 64 year old /White female who presents to the office today as a consult from Sonia HENDRICKS. She is well-known to me for multiple excisions of multiple back masses, which have turned out to be either lipomas or scar tissue. She reports over the course of the past couple of months, she has been having increasing development of more and more lower back lipomas and they seem to be getting bigger and causing her pain. Additionally, she reports she is having lower extremity weakness as well as upper extremity weakness. She is feeling numbness in her fingers and toes. She reports she is having some falls and difficulty with standing.       Past Medical History  Anxiety; Arthritis; Bipolar disorder; Bladder Disorder; Broken Bones; Chronic pain; Depression; Diabetes mellitus, type II; GERD (gastroesophageal reflux disease); Hemorrhoids; Hyperlipemia; Hyperlipidemia; Hypertension; Hypothyroid; Insomnia; Insomnia, unspecified; Kidney calculus; Limb Swelling; Lung disease; Major depressive disorder; Osteoarthritis; Osteopenia; Reflux; Reflux Disease; Thyroid Problems; Varicose veins of both lower extremities; Vitamin D deficiency         Past Surgical History  Ankle repair; Appendectomy; Cholecystectomy; Colonoscopy; Hemorrhoidectomy; Hip Surgery; Hysterectomy; Kidney; Tonsillectomy; Tubal ligation         Medication List  baclofen 10 mg  "oral tablet; Caltrate 600 + D 600 mg (1,500 mg)-800 unit oral tablet,chewable; Crestor 5 mg oral tablet; gabapentin 800 mg oral tablet; Nexium 40 mg oral capsule,delayed release(DR/EC); Nizoral 2 % topical shampoo; Percocet  mg oral tablet; trazodone 150 mg oral tablet; tretinoin 0.05 % topical cream; triamcinolone acetonide 0.1 % topical cream; Vistaril 50 mg oral capsule; Vitamin D3 5,000 unit oral tablet         Allergy List  Cymbalta       Allergies Reconciled  Family Medical History  Liver Neoplasm, Malignant; Lung Neoplasm, Malignant; Ovary Neoplasm, Malignant; Brain Neoplasm, Benign; Heart Disease; Cancer, Unspecified; Diabetes, unspecified type; Colon Cancer; Alzheimer's Disease; Bone Neoplasm, Malignant; Family history of certain chronic disabling diseases; arthritis; Osteoporosis; Family history of Arthritis         Social History  Alcohol (Never); Alcohol Use (Never); .; lives alone; Other Substance Use (Never); Recreational Drug Use (Never); Retired.; Single.; Tobacco (Former)         Review of Systems  · Constitutional  o Denies  o : chills, fever  · Gastrointestinal  o Denies  o : nausea, vomiting      Vitals  Date Time BP Position Site L\R Cuff Size HR RR TEMP (F) WT  HT  BMI kg/m2 BSA m2 O2 Sat        05/06/2020 10:46 AM       16  154lbs 0oz 5'  5\" 25.63 1.79           Physical Examination  · Constitutional  o Appearance  o : well developed, well-nourished, alert and in no acute distress  · Head and Face  o Head  o :   § Inspection  § : no deformities or lesions  · Eyes  o Conjunctivae  o : clear  o Sclerae  o : clear  · Neck  o Inspection/Palpation  o : normal appearance, no masses or tenderness, trachea midline  · Respiratory  o Respiratory Effort  o : breathing unlabored  o Inspection of Chest  o : normal appearance, no retractions  · Cardiovascular  o Heart  o : regular rate and rhythm  · Lymphatic  o Neck  o : no lymphadenopathy present  o Axilla  o : no lymphadenopathy " present  o Groin  o : no lymphadenopathy present  · Skin and Subcutaneous Tissue  o General Inspection  o : on her back, she has multiple small lipomas, which are mildly tender to palpation, as well as multiple excisional scars. No overlying skin changes. No drainage.   · Neurologic  o Cranial Nerves  o : grossly intact  o Sensation  o : grossly intact  o Gait and Station  o :   § Gait Screening  § : normal gait, able to stand without diffculty  o Cerebellar Function  o : no obvious abnormalities  · Psychiatric  o Judgement and Insight  o : judgment and insight intact  o Mood and Affect  o : mood normal, affect appropriate          Assessment  · Pre-Surgical Orders     V72.84  · Preop testing     V72.84/Z01.818  · Skin lesion of back     709.9/L98.9       Patient with multiple back lipomas as well as extremity weakness.       Plan  · Orders  o General Surgery Order (GENOR) - - 05/18/2020  o [facility] Pre-Op Covid-19 Screening (15285) - - 05/14/2020   at 3pm.  · Medications  o Medications have been Reconciled  o Transition of Care or Provider Policy  · Instructions  o PLAN: Excision of Multiple Back Lesions  o Handouts Provided-Pre-Procedure Instructions including date and time and location of procedure.  o ****Patient Status****  o ********************  o RISK AND BENEFITS:  o Consent for surgery: Given these options, the patient has verbally expressed an understanding of the risks of surgery and finds these risks acceptable. We will proceed with surgery as soon as possible.  o Consult Anesthesia for any post-operative block, or any pain management procedure deemed necessary by the anestesiologist for adequate post-operative pain control.   o O.R. PREP: Per protocol  o IV: Per Anesthesia  o PLEASE SIGN PERMIT FOR: Excision of Multiple Back Lesions  o Indocyanine Green- 2.5MG IV- On Call To OR  o *___The above History and Physical Examination has been completed within 30 days of admission.  o Electronically Identified  Patient Education Materials Provided Electronically     We can address the lipomas at any time. These seem to be recurrent. Some of these are likely scar tissue and some of these are maybe new lipomas or enlarging lipomas. Risks and benefits of excising these were discussed with the patient extensively. She has been through this multiple times before and understands the postoperative course as well as the risks and benefits.      In regards to her extremity weakness, difficulty with standing, and multiple falls, I think this is very unlikely to be related to her lipomas and feel with these symptoms and their increasing severity, she should be evaluated by a neurologist. We will work on getting her a neurology referral in the near future. Risks, benefits, and alternatives were discussed with the patient extensively. All questions were answered. The patient voiced understanding and agrees to proceed with the above plan.             Electronically Signed by: Sara Mathews-, -Author on May 11, 2020 03:15:59 PM  Electronically Co-signed by: Matt Hoover MD -Reviewer on May 11, 2020 04:07:17 PM

## 2021-05-10 NOTE — OUTREACH NOTE
Quick Note      Patient Name: Annetta Malagon   Patient ID: [id number]   Sex: Female   YOB: 1955    Primary Care Provider: Sonia HENDRICKS   Referring Provider: Matt Hoover MD    Visit Date: December 31, 2020    Provider: ELADIO Grove   Location: [facility]   Location Address: [address]  [place], [place]  [id number]   Location Phone: [phone]          History Of Present Illness     CCM     TaskID: [id number]    Task Subject: CCM notes December 2020    Task Comments:    Date: Dec  7 2020  2:32PM     Creator: ty  Updated CCM plan of care and goals for the month. (7 minute charting)    Date: Dec  7 2020  1:07PM     Creator: ty  Per ELADIO Buck she was actually seeing Dr. Varghese, not Dr. Velez. I spoke with patient and she reports she was scheduled for today but rescheduled for Friday 12/11/2020. I asked if I could make a follow up with PCP for her 6 weeks, but she is laying down and will call office at later time. She had 9 areas of lipomas removed and is having some pain. She rpeorts he took areas from both hips and her lower back. Her trip went well, she drove herself down and back home, the wedding was a cold day but very nice for her son and new daughter in law. No needs. She did report her thigh is hurting her like she cannot walk good, it is in the front of her thigh, she will call for acute visit if it does not resolve. Will f/u with labs next week. (8 minutes)    Date: Dec  7 2020 11:12AM     Creator: ty  I called patient to see which Dr. Velez she saw last week so I could get her office notes and lab work, I see n one on her list or old notes, unsure if it is rheumatology or cardiology. I called her home phone and it rings and rings with no answer, I called her cell and it goes directly to voicemail. I left detailed message for call bck. Will check with PCP on specialist. (3 minutes)    Date: Dec  7 2020  10:38AM     Creator: ty  Per chart review, last CCM contact was attempt to reach on 11/30/2020 with no success. No call from 10/2020 due to out of town. She saw Dr. Mayes on 11/11/2020 and has f/u with him for 12/17/2020 for post op as she had excision of multiple lower back and buttock lipomas on 12/4/2020. No labs in computer from this, only negative COVID 19 test pre op. PCP has seen  for follow up on 12/1/2020 and she is to return in 6 weeks, no appointment noted. She did telehealth visit with Sonia and PHQ-9 was 9 and she gave Lexapro with 1 refil and patient had some from past and was taking this and doing well, she denies SI/HI. PCP requested labs but patient reports she was getting them last week with follow up with Dr. Velez. I will call for lab results and office notes from cardiology. Op notes printed and scanned for PCP to review. (chart review 25 minutes)             Plan  · Medications  o Medications have been Reconciled  o Transition of Care or Provider Policy            Electronically Signed by: Sahara Zarate RN -Author on December 31, 2020 03:47:24 PM

## 2021-05-10 NOTE — OUTREACH NOTE
Quick Note      Patient Name: Annetta Malagon   Patient ID: [id number]   Sex: Female   YOB: 1955    Primary Care Provider: Sonia HENDRICKS   Referring Provider: Sonia HENDRICKS    Visit Date: April 29, 2020    Provider: ELADIO Grove   Location: [facility]   Location Address: [address] [place] [address] [place]  [id number]   Location Phone: [phone]          History Of Present Illness  CCM Comprehensive Care Plan    This Chronic Medical Management Care Plan for Annetta Malagon, 64 year old /White female, has been monitored and managed and reviewed for the month of April. A cumulative time of 51 minutes was spent on this patient record, including phone call with patient, chart review, and phone call with emergency contact.   Regarding the patient's diagnoses Anxiety, Chronic pain, Hypertension, and Major depressive disorder, the following items were adressed: medical records and medications and any changes can be found within the plan section of the note. A detailed listing of time spent for chronic care management has been scanned into the patient's electronic record. Current medications include: baclofen 10 mg oral tablet, Caltrate 600 + D 600 mg (1,500 mg)-800 unit oral tablet,chewable, Crestor 5 mg oral tablet, gabapentin 800 mg oral tablet, Nexium 40 mg oral capsule,delayed release(DR/EC), Nizoral 2 % topical shampoo, Percocet  mg oral tablet, trazodone 150 mg oral tablet, tretinoin 0.05 % topical cream, triamcinolone acetonide 0.1 % topical cream, Vistaril 50 mg oral capsule, and Vitamin D3 5,000 unit oral tablet and the patient is reported to be compliant with medication protocol. and Medications are reported to be effective.   The patient was monitored remotely for blood pressure for a period of 5 minutes.   This patient's physical needs include: medication education. Discussed meds and generic forms that are more affordable..    Patient's mental support needs include: increased support and contact information. Patient suffers with MDD, Anxiety.   The patient's cognitive support needs include: medication and increased support. Patient will require continued support in assisting with encouraging PT to use meds as prescribed.   The patient's psychosocial support needs include:, need for increased support and contact information. Patient may need support during isolation for COVID 19. has family support.   This patient's functional needs are N/A.   This patient's environmental needs are N/A.           Assessment  · Chronic Care Management (CCM)     V68.89/Z02.89  · Anxiety     300.02/F41.1  · Chronic pain     338.29/G89.29  · Major depressive disorder     296.20/F32.9  · Hypertension     401.9/I10      Plan  · Medications  o Medications have been Reconciled  o Transition of Care or Provider Policy  · Instructions  o Patient's Health Care Goals: _____________________  o Provider's Health Care Goals: ____________________  o Patient was provided an electronic copy of care plan  o CCM services were explained and offered and patient has accepted these services.  o Patient has given their written consent to recieve CCM services and understands that this includes the authorization of electronic communication of medical information with other treating providers.  o Patient understands that they may stop CCM services at any time and these changes will be effective at the end of the calendar month and will effectively revocate the agreement of CCM services.  o Patient understands that only one practioner can furnish and be paid for CCM services during one calendar month. Patient also understands that there may be co-payment or deductible fees in association with CCM services.  o Patient will continue with at least monthly follow-up calls with the Nurse Navigator.  · Associate Tasks  o Task ID [id number] CCM: CCM Nopte April 2020            Electronically  Signed by: Alex Loera MA -Author on April 29, 2020 10:48:36 AM

## 2021-05-10 NOTE — OUTREACH NOTE
Quick Note      Patient Name: Annetta Malagon   Patient ID: [id number]   Sex: Female   YOB: 1955    Primary Care Provider: Sonia HENDRICKS   Referring Provider: Sonia HENDRICKS    Visit Date: April 29, 2020    Provider: ELADIO Grove   Location: [facility]   Location Address: [address] [place] [address] [place]  [id number]   Location Phone: [phone]          History Of Present Illness     [facility]     TaskID: [id number]    Task Subject: CCM Nopte April 2020    Task Comments:    Date: Apr 8 2020 11:02AM     Creator: jerson  I called and advised office Mgr. of the situation just to keep the office in the loop since the PCP was off duty today. ( 4 mins)    Date: Apr 8 2020 11:02AM     Creator: jerson Valentine consulting with My one up I decided to call the patients sister, who is on her SMILEY and ask if she could check in on her. She stated that she would see her later today and would check on her. The sister said it may have been because she just woke up and tht she could be very lathargic acting when she first gets up . She stated that she would see Annetta later today and would check on her. I thanked her and she seemed very apprecative. ( 4 mins)    Date: Apr 8 2020 11:01AM     Creator: jerson  ( continued) and was dropping things somtimes. I also noticed that her speach did not sound like it normally did and she seemed to not be speaking as clearly as usual. I suggested that she follow up with a provider and she declined and said that she would talk to Sonia in June when she had an appointment. I asked if she was sure and she said she was sure. I offered a [employer] visit and explained what that was and she declined. we discussed her meds and her med list was reconciled with no refills required. The patient stated she had no further needs at this time. ( chart review 6 call 37)    Date: Apr 8 2020 10:17AM     Creator: jerson  Per patient chart review on 3-6-20 the  "patient had the removal of multiple Lipoma's from her lower back and sides . The patient is due for follow up with Dr. Hoover on 4-16-20 for post op .The patient was with PCP 3- for AWV documents in chart. She was also seen for itching and burning \"all over\" by PCP prescribed body shampoo which has helped with itching per chart. I called the patient and she said that she was ok still having some pain issues from her back but not as bad as it has been. She stated that her BP was much better and was WNL as far as she knew. She stated that she was staying in as much as posible but since she had to get her own supplies she did have to get out once in a while to get supplies. She stated that she could not get her vistaril 50 mg because it cost too much and insurance would not pay for it. I expained that she could get the generic form which would be much cheaper and she stated laurence she would try it. She stated that she had been having some nimbness in her hands             Plan  · Medications  o Medications have been Reconciled  o Transition of Care or Provider Policy            Electronically Signed by: Alex Loera MA -Author on April 29, 2020 10:49:02 AM  "

## 2021-05-10 NOTE — OUTREACH NOTE
Quick Note      Patient Name: Annetta Malagon   Patient ID: [id number]   Sex: Female   YOB: 1955    Primary Care Provider: Sonia HENDRICKS   Referring Provider: Matt Hoover MD    Visit Date: February 26, 2021    Provider: ELADIO Grove   Location: [facility]   Location Address: [address] [place], [place] [address]  [employer], [place]  [id number]   Location Phone: [phone]          History Of Present Illness     CCM     TaskID: [id number]    Task Subject: CCM notes february 2021    Task Comments:    Date: Feb 17 2021 12:37PM     Creator: ty  Last monthly CCM plan of care documented and goals met. (charting 7 minutes)    Date: Feb 8 2021  4:42PM     Creator: ty  PCP cosigned for CCM discontinuation and is aware of refill task I sent to her as well. (3 minutes)    Date: Feb 8 2021  4:29PM     Creator: ty  Chart review noted her consent from 4/9/2018 she consented for DMT2 diet controlled, anxiey, HLD, HTN, and bipolar. She had last A1c of 5.4%, anxiety has been well controlled with Trazadone and Lexapro, Lipid panel is WNL and her blood pressures are last readings of 133/75, 136/61, and 127/76. I called and spoke with her. She denies any needs but needing medication refills. We discussed CCM services and as her goals are met and she is well controlled in her chronic conditions she is in agreement to unconsent from services. She verbalizes when to contact orthopedics for hip issues, when to contact PCP for depression or anxiety, HTN, or any needs. She reports her [employer] plan keeps calling her to sing up for a Nurse Navigator and she will follow with them but she will miss us as we are like her family. She asked if I would have Froilan the MA to continue to pray for her and she stated she loves us. (chart review and call 32 minutes)                   Electronically Signed by: Sahara Zarate RN -Author on February 26, 2021 08:10:12 AM

## 2021-05-13 NOTE — PROGRESS NOTES
"   Quick Note      Patient Name: Annetta Malagon   Patient ID: [id number]   Sex: Female   YOB: 1955    Primary Care Provider: Sonia HENDRICKS   Referring Provider: Sonia HENDRICKS    Visit Date: June 4, 2020    Provider: ELADIO Grove   Location: [facility]   Location Address: [address] [place], [place] [address]JUMA Mcgowan  [id number]   Location Phone: [phone]          History Of Present Illness  Video Conferencing Visit  Annetta Malagon is a 64 year old /White female who is presenting for evaluation via video conferencing via naaya. Verbal consent obtained before beginning visit.   The following staff were present during this visit: no staff present   Annetta Malagon is a 64 year old /White female who presents for evaluation and treatment of:      Follow up    HLP- on crestor, needs to return for labs  insomnia, she wants to be sure the trazadone is filled for 90 days, it works well  depression, she is trying to stay off meds, \"I'm on the verge of it again, but I'm not so bad I need meds yet.\" SHe denies SI/HI. SHe is not crying like she was in the past.   gerd, she can't afford the nexium, she is due for a EGD. she doesn't want me to schedule that. We have tried to PA the nexium. She would be willing to try somethign else.  she recently had some lipomas removed by gen surgery. she is still sore from that.   She has an appt to see Dr Aguilar for her neuropathy.     she declines mammo.       Physical Examination  · Constitutional  o Appearance  o : well developed, well-nourished, no acute distress  · Neck  o Inspection/Palpation  o : normal appearance  · Respiratory  o Respiratory Effort  o : breathing unlabored  o Inspection of Chest  o : chest rise symmetric bilaterally  · Cardiovascular  o Peripheral Vascular System  o :   § Extremities  § : no edema  · Psychiatric  o Mood and Affect  o : mood normal, affect " appropriate          Assessment  · Depression     311/F32.9  · GERD (gastroesophageal reflux disease)     530.81/K21.9  · Hyperlipidemia     272.4/E78.5  · Insomnia, unspecified     780.52/G47.00  · Osteoarthritis     715.90/M19.90  · Visit for screening mammogram     V76.12/Z12.31  · Neuropathy     355.9/G62.9      Plan  · Orders  o Physical, Primary Care Panel (CBC, CMP, Lipid, TSH) [facility] (05893, 27599, 31666, 03386) - - 06/04/2020  o ACO-39: Current medications updated and reviewed () - - 06/04/2020  · Medications  o cimetidine 400 mg oral tablet   SIG: take 1 tablet (400 mg) by oral route 2 times per day in the morning and at bedtime for 90 days   DISP: (180) tablets with 1 refills  Prescribed on 06/04/2020     o Crestor 5 mg oral tablet   SIG: take 1 tablet (5 mg) by oral route once daily for 90 days   DISP: (90) tablets with 1 refills  Refilled on 06/04/2020     o Nexium 40 mg oral capsule,delayed release(DR/EC)   SIG: take 1 capsule (40 mg) by oral route once daily   DISP: (90) capsules with 1 refills  Refilled on 06/04/2020     o trazodone 150 mg oral tablet   SIG: take 1 tablet by oral route once a day (at bedtime) for 90 days   DISP: (90) tablets with 0 refills  Refilled on 06/04/2020     o Medications have been Reconciled  o Transition of Care or Provider Policy  · Instructions  o Discussed the need for therapy, either with a certified counselor, psychologist, and/or family . If no improvement is noted or worsening of their condition, return to office or ER. But also discussed with patient that if they are non-responsive to the type of medication they may need to see a psychiatrist for further evaluation and management.  o Patient was given an SSRI/SSNRI medication and warned of possible side effects of the medication including potential for increased risk of suicidal thoughts and feelings. Patient was instructed that if they begin to exhibit any of these effects they will discontinue the  "medication immediately and contact our office or the ER ASAP.  o Patient agrees to a \"No Self Harm\" contract. Patient will either call us, 911, ER, [facility], [facility].  o Maintain a healthy weight. Avoid tight fitting clothes. Avoid fried, fatty foods, tomato sauce, chocolate, mint, garlic, onion, alcohol. caffeine. Eat smaller meals, dont lie down after a meal, dont smoke. Elevate the head of your bed 6-9 inches.  o Advised that cheeses and other sources of dairy fats, animal fats, fast food, and the extras (candy, pastries, pies, doughnuts and cookies) all contain LDL raising nutrients. Advised to increase fruits, vegetables, whole grains, and to monitor portion sizes.   o Patient declines breast cancer screening. Discussed risks associated with not having screening performed.   o Patient was educated/instructed on their diagnosis, treatment and medications prior to discharge from the clinic today.  o Patient instructed to seek medical attention urgently for new or worsening symptoms.  o Call the office with any concerns or questions.  o Chronic conditions reviewed and taken into consideration for today's treatment plan.  o she will return for fasting labs  o discuss mammo again at next visit, if she declines will do a breast exam  · Disposition  o Call or Return if symptoms worsen or persist.  o F/u appt in 3 months            Electronically Signed by: ELADIO Grove -Author on June 4, 2020 08:54:39 AM  "

## 2021-05-13 NOTE — PROGRESS NOTES
Progress Note      Patient Name: Annetta Malagon   Patient ID: [id number]   Sex: Female   YOB: 1955    Primary Care Provider: Sonia HENDRICKS   Referring Provider: Matt Hoover MD    Visit Date: August 25, 2020    Provider: Bernice Salmeron PA-C   Location: [facility]   Location Address: [address] [place] [address]  [id number]   Location Phone: [phone]          Chief Complaint  · left ankle pain  · right knee pain  · right hip pain      History Of Present Illness  Annetta Malagon is a 64 year old /White female who presents today to [facility].      She is following up for right hip/knee pain and left ankle pain due to frequent falls. She states swelling and pain in left ankle is most bothersome. She was prescribed diuretic yesterday, but hasn't taken it yet. She states right groin pain especially in bed when she sleeps on her left side.    X-rays of right hip revealed severe degenerative changes.       Past Medical History  Anxiety; Arthritis; Bipolar disorder; Bladder Disorder; Broken Bones; Chronic pain; Congestive heart failure; Depression; Diabetes Mellitus, Type II; GERD (gastroesophageal reflux disease); Hemorrhoids; Hyperlipemia; Hyperlipidemia; Hypertension; Hypothyroid; Insomnia; Insomnia, unspecified; Kidney calculus; Kidney Disease; Limb Swelling; Lung disease; Major depressive disorder; Osteoarthritis; Osteopenia; Reflux; Reflux Disease; Thyroid Problems; Varicose veins of both lower extremities; Vitamin D deficiency         Past Surgical History  Ankle repair; Appendectomy; Cholecystectomy; Colonoscopy; Gallbladder; Hemorrhoidectomy; Hip Surgery; Hysterectomy; Kidney; Surgical Clips; Tonsillectomy; Tubal ligation         Medication List  baclofen 10 mg oral tablet; cimetidine 400 mg oral tablet; Crestor 5 mg oral tablet; gabapentin 800 mg oral tablet; hydroxyzine HCl 50 mg oral tablet; Lasix 20 mg oral tablet; Mobic 15 mg oral tablet;  "Nexium 40 mg oral capsule,delayed release(DR/EC); Percocet  mg oral tablet; trazodone 150 mg oral tablet; Vitamin D3 5,000 unit oral tablet         Allergy List  Cymbalta       Allergies Reconciled  Family Medical History  Liver Neoplasm, Malignant; Lung Neoplasm, Malignant; Ovary Neoplasm, Malignant; Brain Neoplasm, Benign; Stroke; Heart Disease; Cancer, Unspecified; Diabetes, unspecified type; Colon Cancer; Alzheimer's Disease; Bone Neoplasm, Malignant; Family history of certain chronic disabling diseases; arthritis; Osteoporosis; Family history of Arthritis         Social History  Alcohol (Never); Alcohol Use (Current some day); Claustophobic (Unknown); .; lives alone; Other Substance Use (Never); Recreational Drug Use (Never); Retired.; Single.; Tobacco (Former)         Review of Systems  · Constitutional  o Denies  o : fever, chills, weight loss  · Cardiovascular  o Denies  o : chest pain, shortness of breath  · Gastrointestinal  o Denies  o : liver disease, heartburn, nausea, blood in stools  · Genitourinary  o Denies  o : painful urination, blood in urine  · Integument  o Denies  o : rash, itching  · Neurologic  o Denies  o : headache, weakness, loss of consciousness  · Musculoskeletal  o Admits  o : painful, swollen joints  · Psychiatric  o Denies  o : drug/alcohol addiction, anxiety, depression      Vitals  Date Time BP Position Site L\R Cuff Size HR RR TEMP (F) WT  HT  BMI kg/m2 BSA m2 O2 Sat        08/25/2020 11:00 AM      71 - R   166lbs 7oz 5'  6\" 26.86 1.87 98 %          Physical Examination  · Constitutional  o Appearance  o : well developed, well-nourished, no obvious deformities present  · Head and Face  o Head  o :   § Inspection  § : normocephalic  o Face  o :   § Inspection  § : no facial lesions  · Eyes  o Conjunctivae  o : conjunctivae normal  o Sclerae  o : sclerae white  · Ears, Nose, Mouth and Throat  o Ears  o :   § External Ears  § : appearance within normal " limits  § Hearing  § : intact  o Nose  o :   § External Nose  § : appearance normal  · Neck  o Inspection/Palpation  o : normal appearance  o Range of Motion  o : full range of motion  · Respiratory  o Respiratory Effort  o : breathing unlabored  o Inspection of Chest  o : normal appearance  o Auscultation of Lungs  o : no audible wheezing or rales  · Cardiovascular  o Heart  o : regular rate  · Gastrointestinal  o Abdominal Examination  o : soft and non-tender  · Skin and Subcutaneous Tissue  o General Inspection  o : intact, no rashes  · Psychiatric  o General  o : Alert and oriented x3  o Judgement and Insight  o : judgment and insight intact  o Mood and Affect  o : mood normal, affect appropriate  · Right Hip  o Inspection  o : Normal appearing. Nontender. ROM decreased. + Groin pain with figure of 4. Antalgic gait. Neurovascularly intact.   · Left Ankle/Foot  o Inspection  o : Moderate swelling. Tender Achilles. Limited ROM. Sensation grossly intact. Brisk capillary refill.           Assessment  · Primary osteoarthritis of right hip     715.15/M16.11  · Left ankle pain     719.47/M25.572  · Right Pain: Hip     719.45/M25.559  · Right knee pain, unspecified chronicity     719.46/M25.561      Plan  · Medications  o Medications have been Reconciled  o Transition of Care or Provider Policy  · Instructions  o Reviewed the patient's Past Medical, Social, and Family history as well as the ROS at today's visit, no changes.  o Call or return if worsening symptoms.  o Intra-articular injection ordered for right hip. Follow up 8 weeks. Recommended compression socks for left ankle, which she states she has at home.   o Electronically Identified Patient Education Materials Provided Electronically            Electronically Signed by: MIR Figueroa-C -Author on August 25, 2020 12:04:18 PM

## 2021-05-13 NOTE — PROGRESS NOTES
Progress Note      Patient Name: Annetta Malagon   Patient ID: [id number]   Sex: Female   YOB: 1955    Primary Care Provider: Sonia HENDRICKS   Referring Provider: Matt Hoover MD    Visit Date: December 1, 2020    Provider: ELADIO Grove   Location: [facility]   Location Address: [address] [place], [place] [address], [place]  [id number]   Location Phone: [phone]          Chief Complaint  · med refills  · follow up      History Of Present Illness  TELEHEALTH TELEPHONE VISIT  Annetta Malagon is a 64 year old /White female who is presenting for evaluation via telehealth telephone visit. Verbal consent obtained before beginning visit.   Provider spent 22 minutes with patient during telehealth visit.   The following staff were present during this visit: gm   Past Medical History/Overview of Patient Symptoms  Annetta Malagon is a 64 year old /White female who presents for evaluation and treatment of:        HLP- doing ok on current meds, she needs labs, she wants to have them done when she sees dr Velez later in the week    GERD- she can't get nexium covered, she is going to get it otc. She is on cimetidine    Anxiety depression- she'd like to go back on the lexapro, she denies si/hi. She has been on an old bottle for the last month and she is almost out    Insomnia- on trazodone, no issues    vit d def- on daily supplements otc    b12 def- managed by dr Velez    she has gained 20 lbs       Past Medical History  Disease Name Date Onset Notes   Anxiety --  --    Arthritis --  --    Bipolar disorder 2010 --    Bladder Disorder --  --    Broken Bones may 2011 --    Chronic pain 05/17/2019 --    Congestive heart failure --  --    Depression 2000 --    Diabetes Mellitus, Type II 2010 --    GERD (gastroesophageal reflux disease) 08/30/2018 --    Hemorrhoids --  --    Hyperlipemia --  --    Hyperlipidemia --  --    Hypertension --  --     Hypothyroid 08/30/2018 --    Insomnia --  --    Insomnia, unspecified 11/15/2019 --    Kidney calculus 1983 --    Kidney Disease --  --    Limb Swelling --  --    Lung disease --  --    Major depressive disorder 05/17/2019 --    Osteoarthritis 11/15/2019 --    Osteopenia 11/15/2019 --    Reflux --  --    Reflux Disease 2010 --    Thyroid Problems 2008 --    Varicose veins of both lower extremities 08/30/2018 --    Vitamin D deficiency 08/30/2018 --          Past Surgical History  Procedure Name Date Notes   Ankle repair 2011 Right ankle   Appendectomy --  --    Colonoscopy 2011 2017 --    Gallbladder --  --    Hemorrhoidectomy 1982 --    Hip Surgery 2015 broke    Hysterectomy 1995 --    Kidney --  --    Surgical Clips --  --    Tonsillectomy 1960 --    Tubal ligation 1982 --          Medication List  Name Date Started Instructions   baclofen 10 mg oral tablet  take 1 tablet (10 mg) by oral route 3 times per day   cimetidine 400 mg oral tablet 06/04/2020 take 1 tablet (400 mg) by oral route 2 times per day in the morning and at bedtime for 90 days   Crestor 5 mg oral tablet 06/04/2020 take 1 tablet (5 mg) by oral route once daily for 90 days   gabapentin 800 mg oral tablet 08/10/2020 take 1 tablet (800 mg) by oral route 3 times per day   hydroxyzine HCl 50 mg oral tablet  take 1-2 tablets by oral route daily   Lasix 20 mg oral tablet 08/24/2020 take 0.5 tablet by oral route daily for 30 days   Mobic 15 mg oral tablet 10/20/2020 take 1 tablet (15 mg) by oral route once daily   Nexium 40 mg oral capsule,delayed release(DR/EC) 06/04/2020 take 1 capsule (40 mg) by oral route once daily   Percocet  mg oral tablet 08/10/2020 take 1 tablet by oral route every 6 hours as needed   trazodone 150 mg oral tablet 11/16/2020 take 1 tablet by oral route once a day (at bedtime) for 90 days   Vitamin D3 5,000 unit oral tablet  take 1 tablet by oral route daily   Voltaren 1 % topical gel 11/11/2020 apply 2 grams to the  affected area(s) by topical route 4 times per day         Allergy List  Allergen Name Date Reaction Notes   Cymbalta --  --  --        Allergies Reconciled  Family Medical History  Disease Name Relative/Age Notes   Liver Neoplasm, Malignant Mother/   --    Lung Neoplasm, Malignant Mother/   --    Ovary Neoplasm, Malignant Mother/   --    Brain Neoplasm, Benign  --    Stroke Father/   Father   Heart Disease Mother/  Sister/   Mother; Sister   Cancer, Unspecified Brother/  Mother/   Mother; Brother   Diabetes, unspecified type Son/   Son   Colon Cancer  2 cousin (ages 70& 52--maternal side)--still living ages 70& 65   Alzheimer's Disease Father/   passed away age 83 01/2015   Bone Neoplasm, Malignant Mother/   Passed age 83 yrs old 03/03/15   Family history of certain chronic disabling diseases; arthritis Father/   Father   Osteoporosis Sister/   Sister   Family history of Arthritis Father/  Sister/   Father; Sister         Social History  Finding Status Start/Stop Quantity Notes   Alcohol Use --  --/-- --  11/11/2020 - rarely drinks, 1 drink per day, has been drinking for 31 or more years   Claustophobic Unknown --/-- --  yes   . --  --/-- --  --    lives alone --  --/-- --  --    Other Substance Use Never --/-- --  --    Recreational Drug Use Never --/-- --  no  06/30/2020 - no   Retired. --  --/-- --  --    Tobacco Former --/-- --  former smoker  06/30/2020 - former smoker  using vapor 2-3 times per day         Immunizations  NameDate Admin Mfg Trade Name Lot Number Route Inj VIS Given VIS Publication   Vlrhufdxq14/17/2018 SKB Fluarix, quadrivalent, preservative free [id number] [facility] 09/17/2018 08/07/2015   Comments: pt tolerated well, left office in stable condition   Ejrepvnfbaip03/20/2014 MSD PNEUMOVAX 23 [id number] IM LD 10/20/2014 02/27/2013   Comments:    Foohaurcy7323/15/2019 MSD PNEUMOVAX 23 [id number]  RD 11/15/2019    Comments: Pt tolerated well,left office in stable condition         Review of  Systems  · Constitutional  o Admits  o : weight gain  o Denies  o : fever, fatigue, weight loss  · HENT  o Denies  o : headaches, vertigo, lightheadedness  · Cardiovascular  o Denies  o : lower extremity edema, claudication, chest pressure, palpitations  · Respiratory  o Denies  o : shortness of breath, wheezing, cough, hemoptysis, dyspnea on exertion  · Gastrointestinal  o Denies  o : nausea, vomiting, diarrhea, constipation, abdominal pain  · Genitourinary  o Denies  o : urgency, frequency, dysuria  · Musculoskeletal  o Admits  o : joint pain  · Psychiatric  o Admits  o : anxiety, depression  o Denies  o : suicidal ideation, homicidal ideation              Assessment  · Anxiety disorder     300.00/F41.9  · Fatigue     780.79/R53.83  · Hyperlipidemia     272.4/E78.5  · Insomnia, unspecified     780.52/G47.00  · Major depressive disorder     296.20/F32.2  · Osteoarthritis     715.90/M19.90  · Vitamin D deficiency     268.9/E55.9  · Screening for depression     V79.0/Z13.89  · B12 deficiency     266.2/E53.8  · Medication monitoring encounter     V58.83/Z51.81      Plan  · Orders  o ACO-39: Current medications updated and reviewed (, 1159F) - - 12/01/2020  o ACO-18: Positive screen for clinical depression using a standardized tool and a follow-up plan documented () - - 12/01/2020  o Physician Telephone Evaluation, 21-30 minutes (13555) - - 12/01/2020  · Medications  o Lexapro 20 mg oral tablet   SIG: take 1 tablet (20 mg) by oral route once daily   DISP: (30) Tablet with 1 refills  Prescribed on 12/01/2020     o cimetidine 400 mg oral tablet   SIG: take 1 tablet (400 mg) by oral route 2 times per day in the morning and at bedtime for 90 days   DISP: (180) Tablet with 1 refills  Refilled on 12/01/2020     o Crestor 5 mg oral tablet   SIG: take 1 tablet (5 mg) by oral route once daily for 90 days   DISP: (90) Tablet with 1 refills  Refilled on 12/01/2020     o Medications have been Reconciled  o Transition of  "Care or Provider Policy  · Instructions  o Discussed the need for therapy, either with a certified counselor, psychologist, and/or family . If no improvement is noted or worsening of their condition, return to office or ER. But also discussed with patient that if they are non-responsive to the type of medication they may need to see a psychiatrist for further evaluation and management.  o Patient was given an SSRI/SSNRI medication and warned of possible side effects of the medication including potential for increased risk of suicidal thoughts and feelings. Patient was instructed that if they begin to exhibit any of these effects they will discontinue the medication immediately and contact our office or the ER ASAP.  o Patient agrees to a \"No Self Harm\" contract. Patient will either call us, 911, ER, [facility], [facility].  o Advised that cheeses and other sources of dairy fats, animal fats, fast food, and the extras (candy, pastries, pies, doughnuts and cookies) all contain LDL raising nutrients. Advised to increase fruits, vegetables, whole grains, and to monitor portion sizes.   o Avoid any electronic use for at least 30 minutes prior to bed time. Cell phone screens, tablets and TVs imitate daylight, so your brain can become confused on the time of day. No caffeine use in the late afternoon and evenings.  o Patient agrees to a \"No Self Harm\" contract. Patient will either call us, 911, ER, [facility], [facility].  o Depression Screen completed and scanned into the EMR under the designated folder within the patient's documents.  o Today's PHQ-9 result is __9_  o The provider screening met the required time of 15 minutes.  o Plan Of Care: will restart lexapro, f/u in 6 weeks sooner if needed, labs from dr salinas to be faxed here please.   o Take all medications as prescribed/directed.  o Rest. Increase Fluids.  o Patient was instructed to exercise " regularly.  o Call the office with any concerns or questions.  o Minutes spent with patient including greater than 50% in Education/Counseling/Care Coordination.  · Disposition  o Call or Return if symptoms worsen or persist.  o F/U 6 weeks            Electronically Signed by: ELADIO Grove -Author on December 1, 2020 03:14:48 PM

## 2021-05-13 NOTE — PROGRESS NOTES
Progress Note      Patient Name: Annetta Malagon   Patient ID: [id number]   Sex: Female   YOB: 1955    Primary Care Provider: Sonia HENDRICKS   Referring Provider: Sonia HENDRICKS    Visit Date: Tasha 3, 2020    Provider: Matt Hoover MD   Location[facility]   Location Address: [address] [place] [address]  [id number]   Location Phone: [phone]          Chief Complaint  · Status Post Surgery      History Of Present Illness  Annetta Malagon is a 64 year old /White female who presents today for a postoperative visit. She follows-up status post excision of more lower back lesions. The patient reports she has done well. She is feeling better. She reports her right side feels much better than it did before. She still has a little bit of pain on the left side but no further knots. She feels her incisions are healing well.       Past Medical History  Anxiety; Arthritis; Bipolar disorder; Bladder Disorder; Broken Bones; Chronic pain; Depression; Diabetes mellitus, type II; GERD (gastroesophageal reflux disease); Hemorrhoids; Hyperlipemia; Hyperlipidemia; Hypertension; Hypothyroid; Insomnia; Insomnia, unspecified; Kidney calculus; Limb Swelling; Lung disease; Major depressive disorder; Osteoarthritis; Osteopenia; Reflux; Reflux Disease; Thyroid Problems; Varicose veins of both lower extremities; Vitamin D deficiency         Past Surgical History  Ankle repair; Appendectomy; Cholecystectomy; Colonoscopy; Hemorrhoidectomy; Hip Surgery; Hysterectomy; Kidney; Tonsillectomy; Tubal ligation         Medication List  baclofen 10 mg oral tablet; Caltrate 600 + D 600 mg (1,500 mg)-800 unit oral tablet,chewable; cimetidine 400 mg oral tablet; Crestor 5 mg oral tablet; gabapentin 800 mg oral tablet; Nexium 40 mg oral capsule,delayed release(DR/EC); Nizoral 2 % topical shampoo; Percocet  mg oral tablet; trazodone 150 mg oral tablet; tretinoin 0.05 % topical  "cream; triamcinolone acetonide 0.1 % topical cream; Vistaril 50 mg oral capsule; Vitamin D3 5,000 unit oral tablet         Allergy List  Cymbalta       Allergies Reconciled  Family Medical History  Liver Neoplasm, Malignant; Lung Neoplasm, Malignant; Ovary Neoplasm, Malignant; Brain Neoplasm, Benign; Heart Disease; Cancer, Unspecified; Diabetes, unspecified type; Colon Cancer; Alzheimer's Disease; Bone Neoplasm, Malignant; Family history of certain chronic disabling diseases; arthritis; Osteoporosis; Family history of Arthritis         Social History  Alcohol (Never); Alcohol Use (Never); .; lives alone; Other Substance Use (Never); Recreational Drug Use (Never); Retired.; Single.; Tobacco (Former)         Review of Systems  · Cardiovascular  o Denies  o : chest pain on exertion, shortness of breath, lower extremity swelling  · Respiratory  o Denies  o : shortness of breath, coughing up blood  · Gastrointestinal  o Denies  o : chronic abdominal pain      Vitals  Date Time BP Position Site L\R Cuff Size HR RR TEMP (F) WT  HT  BMI kg/m2 BSA m2 O2 Sat        06/03/2020 10:47 AM       14  155lbs 0oz 5'  5\" 25.79 1.8           Physical Examination  · Constitutional  o Appearance  o : well developed, well-nourished, alert and in no acute distress  · Head and Face  o Head  o :   § Inspection  § : no deformities or lesions  · Eyes  o Conjunctivae  o : clear  o Sclerae  o : nonicteric  · Neck  o Inspection/Palpation  o : normal appearance, no masses or tenderness, trachea midline  · Respiratory  o Respiratory Effort  o : breathing unlabored  o Inspection of Chest  o : normal appearance, no retractions  · Cardiovascular  o Heart  o : regular rate and rhythm  · Gastrointestinal  o Abdominal Examination  o :   § Abdomen  § : soft, nontender, nondistended  · Lymphatic  o Neck  o : no lymphadenopathy present  o Axilla  o : no lymphadenopathy present  o Groin  o : no lymphadenopathy present  · Skin and Subcutaneous " Tissue  o General Inspection  o : incisions appear to be healing well on her back.  · Neurologic  o Cranial Nerves  o : grossly intact  o Sensation  o : grossly intact  o Gait and Station  o :   § Gait Screening  § : normal gait, able to stand without diffculty  o Cerebellar Function  o : no obvious abnormalities  · Psychiatric  o Judgement and Insight  o : judgment and insight intact  o Mood and Affect  o : mood normal, affect appropriate          Assessment  · Postoperative Exam Following Surgery     V67.00       Patient status post excision of multiple lower back lipomas.       Plan  · Medications  o Medications have been Reconciled  o Transition of Care or Provider Policy     The patient is doing well and healing as expected. I am pleased with her overall progress. Her incisions appear to be healing well and I think we have addressed most of the lipomas. She is almost certainly likely to have a recurrence, as she has in the past. I think some of her symptoms are not related to these lipomas. She continues to report dropping things and having difficulty with ambulation and radiating pain from her back, which I think maybe neurologic in origin. She does have an appointment with the neurologist and I think that should address most of her current issues. I discussed all of this with the patient. All questions were answered. She voiced understanding and agreed to proceed with the above plan.             Electronically Signed by: Sara Mathews-, -Author on June 4, 2020 09:41:39 AM  Electronically Co-signed by: Matt Hoover MD -Reviewer on June 4, 2020 10:20:14 AM

## 2021-05-13 NOTE — PROGRESS NOTES
Progress Note      Patient Name: Annetta Malagon   Patient ID: [id number]   Sex: Female   YOB: 1955    Primary Care Provider: Sonia HENDRICKS   Referring Provider: Matt Hoover MD    Visit Date: October 20, 2020    Provider: Bernice Salmeron PA-C   Location: [facility]   Location Address: [address] [place] [address]  [id number]   Location Phone: [phone]          Chief Complaint  · Follow up right hip pain      History Of Present Illness  Annetta Malagon is a 64 year old /White female who presents today to [facility].      She is here for follow up for right hip pain. She states intra-articular injection helped greatly, but complains of bilateral lateral hip pain. She requests injection bilateral trochanteric bursa since she has upcoming car trip.       Past Medical History  Anxiety; Arthritis; Bipolar disorder; Bladder Disorder; Broken Bones; Chronic pain; Congestive heart failure; Depression; Diabetes Mellitus, Type II; GERD (gastroesophageal reflux disease); Hemorrhoids; Hyperlipemia; Hyperlipidemia; Hypertension; Hypothyroid; Insomnia; Insomnia, unspecified; Kidney calculus; Kidney Disease; Limb Swelling; Lung disease; Major depressive disorder; Osteoarthritis; Osteopenia; Reflux; Reflux Disease; Thyroid Problems; Varicose veins of both lower extremities; Vitamin D deficiency         Past Surgical History  Ankle repair; Appendectomy; Cholecystectomy; Colonoscopy; Gallbladder; Hemorrhoidectomy; Hip Surgery; Hysterectomy; Kidney; Surgical Clips; Tonsillectomy; Tubal ligation         Medication List  baclofen 10 mg oral tablet; cimetidine 400 mg oral tablet; Crestor 5 mg oral tablet; gabapentin 800 mg oral tablet; hydroxyzine HCl 50 mg oral tablet; Lasix 20 mg oral tablet; Mobic 15 mg oral tablet; Nexium 40 mg oral capsule,delayed release(DR/EC); Percocet  mg oral tablet; trazodone 150 mg oral tablet; Vitamin D3 5,000 unit oral tablet  "        Allergy List  Cymbalta       Allergies Reconciled  Family Medical History  Liver Neoplasm, Malignant; Lung Neoplasm, Malignant; Ovary Neoplasm, Malignant; Brain Neoplasm, Benign; Stroke; Heart Disease; Cancer, Unspecified; Diabetes, unspecified type; Colon Cancer; Alzheimer's Disease; Bone Neoplasm, Malignant; Family history of certain chronic disabling diseases; arthritis; Osteoporosis; Family history of Arthritis         Social History  Alcohol (Never); Alcohol Use (Current some day); Claustophobic (Unknown); .; lives alone; Other Substance Use (Never); Recreational Drug Use (Never); Retired.; Single.; Tobacco (Former)         Review of Systems  · Constitutional  o Denies  o : fever, chills, weight loss  · Cardiovascular  o Denies  o : chest pain, shortness of breath  · Gastrointestinal  o Denies  o : liver disease, heartburn, nausea, blood in stools  · Genitourinary  o Denies  o : painful urination, blood in urine  · Integument  o Denies  o : rash, itching  · Neurologic  o Denies  o : headache, weakness, loss of consciousness  · Musculoskeletal  o Admits  o : painful, swollen joints  · Psychiatric  o Denies  o : drug/alcohol addiction, anxiety, depression      Vitals  Date Time BP Position Site L\R Cuff Size HR RR TEMP (F) WT  HT  BMI kg/m2 BSA m2 O2 Sat FR L/min FiO2 HC       10/20/2020 11:09 AM      78 - R   169lbs 0oz 5'  6\" 27.28 1.89 97 %            Physical Examination  · Constitutional  o Appearance  o : well developed, well-nourished, no obvious deformities present  · Head and Face  o Head  o :   § Inspection  § : normocephalic  o Face  o :   § Inspection  § : no facial lesions  · Eyes  o Conjunctivae  o : conjunctivae normal  o Sclerae  o : sclerae white  · Ears, Nose, Mouth and Throat  o Ears  o :   § External Ears  § : appearance within normal limits  § Hearing  § : intact  o Nose  o :   § External Nose  § : appearance normal  · Neck  o Inspection/Palpation  o : normal " appearance  o Range of Motion  o : full range of motion  · Respiratory  o Respiratory Effort  o : breathing unlabored  o Inspection of Chest  o : normal appearance  o Auscultation of Lungs  o : no audible wheezing or rales  · Cardiovascular  o Heart  o : regular rate  · Gastrointestinal  o Abdominal Examination  o : soft and non-tender  · Skin and Subcutaneous Tissue  o General Inspection  o : intact, no rashes  · Psychiatric  o General  o : Alert and oriented x3  o Judgement and Insight  o : judgment and insight intact  o Mood and Affect  o : mood normal, affect appropriate  · Extremities  o Extremities  o : BILATERAL HIPS: Normal appearing. Tender greater trochanter. ROM decreased. + Groin pain with figure of 4. Antalgic gait. Neurovascularly intact.   · Injection Note/Aspiration Note  o Site  o : bilateral hips   o Procedure  o : Procedure: After educating the patient, patient gave consent for procedure. After using Chloraprep, the joint space was injected. The patient tolerated the procedure well.   o Medication  o : 80 mg of DepoMedrol with 9cc of 1% Lidocaine          Assessment  · Primary osteoarthritis of hip     715.15/M16.10  · Trochanteric Bursitis     726.5/M70.60  · Right Pain: Hip     719.45/M25.559      Plan  · Orders  o 2 - Depo-Medrol injection 80mg () - 719.45/M25.559 - 10/20/2020   Lot [id number] exp 07 21 Robbie HESTER  o 2 - Hip Intra-articular Injection without US Guidance [facility] (94750) - 719.45/M25.559 - 10/20/2020   Lot 08 [id number] [id number] exp 08 21 [facility] Bernice HESTER  · Medications  o Medications have been Reconciled  o Transition of Care or Provider Policy  · Instructions  o Reviewed the patient's Past Medical, Social, and Family history as well as the ROS at today's visit, no changes.  o Call or return if worsening symptoms.  o Bilateral hip injection. Follow up PRN.   o Electronically Identified Patient Education Materials Provided Electronically            Electronically Signed by: Bernice CHAVEZ  ISIS Salmeron -Author on October 20, 2020 11:41:12 AM  Electronically Co-signed by: Merna Mendoza MD -Reviewer on October 22, 2020 07:50:19 AM

## 2021-05-13 NOTE — PROGRESS NOTES
Quick Note      Patient Name: Annetta Malagon   Patient ID: [id number]   Sex: Female   YOB: 1955    Primary Care Provider: Sonia HENDRICKS   Referring Provider: Matt Hoover MD    Visit Date: August 24, 2020    Provider: ELADIO Grove   Location: [facility]   Location Address: [address] [place] [address] JUMA Reynolds  [id number]   Location Phone: [phone]          History Of Present Illness  TELEHEALTH TELEPHONE VISIT  Annetta Malagon is a 64 year old /White female who is presenting for evaluation via telehealth telephone visit. Verbal consent obtained before beginning visit. Patient is alone on this call.   Provider spent 16 minutes with patient during telehealth visit.   The following staff were present during this visit: ANDREA Ochoa   Past Medical History/Overview of Patient Symptoms     insomnia, she needs refills on trazodone, it is working well for her.    swelling in her feet and ankles, she would like a rx for a water pill because she can't tolerate the compression socks.     arthritis, she is getting ready to see Dr Mendoza about having her hip replaced, but she wants to wait until after her son gets  in October.           Assessment  · Insomnia, unspecified     780.52/G47.00  · Peripheral edema     782.3/R60.9  · DJD (degenerative joint disease)     715.90/M19.90  · Hip pain     719.45/M25.559      Plan  · Orders  o ACO-39: Current medications updated and reviewed () - - 08/24/2020  o Physician Telephone Evaluation, 11-20 minutes ([id number]) - - 08/24/2020  · Medications  o Lasix 20 mg oral tablet   SIG: take 0.5 tablet by oral route daily for 30 days   DISP: (15) tablets with 1 refills  Prescribed on 08/24/2020     o trazodone 150 mg oral tablet   SIG: take 1 tablet by oral route once a day (at bedtime) for 90 days   DISP: (90) tablets with 0 refills  Refilled on 08/24/2020     o Medications have been Reconciled  o Transition of Care or  Provider Policy  · Instructions  o Avoid any electronic use for at least 30 minutes prior to bed time. Cell phone screens, tablets and TVs immitate daylight, so your brain can become confused on the time of day. No caffeine use in the late afternoon and evenings.  o Take all medications as prescribed/directed.  o Rest. Increase Fluids.  o Patient was educated/instructed on their diagnosis, treatment and medications prior to discharge from the clinic today.  o Patient instructed to seek medical attention urgently for new or worsening symptoms.  o Call the office with any concerns or questions.  o Minutes spent with patient including greater than 50% in Education/Counseling/Care Coordination.  o Time spent with the patient was minutes, more than 50% face to face.  o Chronic conditions reviewed and taken into consideration for today's treatment plan.  o Plan Of Care: try using the diabetic compression socks rather than the prescription socks for ease of putting them on  o she says she is going to have labs done on the 31st with dr barnes at her office and she will send me a copy for drug monitoring on the lasix. I'm going to start her on just 1/2 pill a day, watch her bp, take during the morning.   · Disposition  o Call or Return if symptoms worsen or persist.  o F/u appt in 3 months            Electronically Signed by: ELADIO Grove -Author on August 24, 2020 08:58:02 AM

## 2021-05-14 VITALS — HEIGHT: 66 IN | OXYGEN SATURATION: 94 % | WEIGHT: 167 LBS | BODY MASS INDEX: 26.84 KG/M2 | HEART RATE: 76 BPM

## 2021-05-14 VITALS — HEIGHT: 66 IN | WEIGHT: 177.12 LBS | BODY MASS INDEX: 28.47 KG/M2

## 2021-05-14 VITALS — HEART RATE: 78 BPM | BODY MASS INDEX: 27.16 KG/M2 | OXYGEN SATURATION: 97 % | WEIGHT: 169 LBS | HEIGHT: 66 IN

## 2021-05-14 VITALS
DIASTOLIC BLOOD PRESSURE: 78 MMHG | TEMPERATURE: 96.9 F | RESPIRATION RATE: 18 BRPM | HEART RATE: 78 BPM | HEIGHT: 66 IN | OXYGEN SATURATION: 96 % | SYSTOLIC BLOOD PRESSURE: 131 MMHG

## 2021-05-14 VITALS — HEIGHT: 66 IN | WEIGHT: 170 LBS | BODY MASS INDEX: 27.32 KG/M2 | OXYGEN SATURATION: 97 % | HEART RATE: 73 BPM

## 2021-05-14 VITALS — WEIGHT: 170.12 LBS | BODY MASS INDEX: 27.34 KG/M2 | HEIGHT: 66 IN

## 2021-05-14 VITALS — HEIGHT: 66 IN | HEART RATE: 71 BPM | WEIGHT: 166.44 LBS | OXYGEN SATURATION: 98 % | BODY MASS INDEX: 26.75 KG/M2
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
## 2021-05-14 NOTE — PROGRESS NOTES
Progress Note      Patient Name: Annetta Malagon   Patient ID: [id number]   Sex: Female   YOB: 1955    Primary Care Provider: Sonia HENDRICKS   Referring Provider: Matt Hoover MD    Visit Date: April 26, 2021    Provider: ELADIO Grove   Location: [facility]   Location Address: [address] [place] [address]  [id number]   Location Phone: [phone]          Chief Complaint  · medication concerns      History Of Present Illness  Annetta Malagon is a 65 year old /White female who presents for evaluation and treatment of:   TELEHEALTH TELEPHONE VISIT  Annetta Malagon is a 65 year old /White female who is presenting for evaluation via telehealth telephone visit. Verbal consent obtained before beginning visit.   Provider spent 16 minutes with patient during telehealth visit.   The following staff were present during this visit: gm   Past Medical History/Overview of Patient Symptoms     multiple concerns    recent hip replacement a month ago, she is doing good, is going to therapy now.  I reviewed her hospital discharge summary with her.     HTN- she is not sure if she is supposed to be on 1/2 the lasx or a whole. She says they discharged her wiht a whole 20mg dose and that's what she has been taking. BPs are doing good.     she is hving jerking spells when she lays down at night, dreams she is falling out of bed.    she has had 3 falls since her surgery, none wiht injury. She thought the surgery was supposed to help with that.  She stpped the baclofen and it's not helped at all.     insomnia, she is due for refills on trazodone in 2 weeks.       Past Medical History  Disease Name Date Onset Notes   Anxiety --  --    Arthritis --  --    Bipolar disorder 2010 --    Bladder disorder --  --    Broken Bones may 2011 --    Chronic pain 05/17/2019 --    Congestive heart failure --  --    Diabetes Mellitus, Type II 2010 --    GERD  (gastroesophageal reflux disease) 08/30/2018 --    Hemorrhoids --  --    Hyperlipidemia --  --    Hypertension --  --    Hypothyroid 08/30/2018 --    Insomnia --  --    Insomnia, unspecified 11/15/2019 --    Kidney calculus 1983 --    Kidney Disease --  --    Limb Swelling --  --    Lung disease --  --    Major depressive disorder 05/17/2019 --    Osteoarthritis 11/15/2019 --    Osteopenia 11/15/2019 --    Reflux Disease 2010 --    Thyroid Problems 2008 --    Varicose veins of both lower extremities 08/30/2018 --    Vitamin D deficiency 08/30/2018 --          Past Surgical History  Procedure Name Date Notes   Ankle repair 2011 Right ankle   Appendectomy --  --    Colonoscopy 2011 2017 --    Gallbladder --  --    Hemorrhoidectomy 1982 --    Hip Surgery 2015 broke    Hysterectomy 1995 --    Kidney --  --    Surgical Clips --  --    Tonsillectomy 1960 --    Tubal ligation 1982 --          Medication List  Name Date Started Instructions   ammonium lactate 12 % topical cream 02/25/2021 apply to affected area(s) by topical route daily   baclofen 10 mg oral tablet  take 1 tablet (10 mg) by oral route 3 times per day   cimetidine 400 mg oral tablet 12/01/2020 take 1 tablet (400 mg) by oral route 2 times per day in the morning and at bedtime for 90 days   Crestor 5 mg oral tablet 12/01/2020 take 1 tablet (5 mg) by oral route once daily for 90 days   Flonase Allergy Relief 50 mcg/actuation nasal spray,suspension 02/25/2021 inhale 2 sprays (100 mcg) in each nostril by intranasal route once daily as needed   gabapentin 800 mg oral tablet 08/10/2020 take 1 tablet (800 mg) by oral route 3 times per day   hydroxyzine HCl 50 mg oral tablet  take 1-2 tablets by oral route daily   Lasix 20 mg oral tablet 02/12/2021 take 0.5 tablet by oral route daily for 30 days   Lexapro 20 mg oral tablet 12/01/2020 take 1 tablet (20 mg) by oral route once daily   Mobic 15 mg oral tablet 10/20/2020 take 1 tablet (15 mg) by oral route once daily    Nexium 40 mg oral capsule,delayed release(DR/EC) 06/04/2020 take 1 capsule (40 mg) by oral route once daily   oxycodone 5 mg oral tablet 04/22/2021 take 1 tablet (5 mg) by oral route every 4-6 hours as needed   Percocet  mg oral tablet 08/10/2020 take 1 tablet by oral route every 6 hours as needed   trazodone 150 mg oral tablet 02/12/2021 take 1 tablet by oral route once a day (at bedtime) for 90 days   Vitamin D3 5,000 unit oral tablet  take 1 tablet by oral route daily   Voltaren 1 % topical gel 01/25/2021 apply 2 grams to the affected area(s) by topical route 4 times per day         Allergy List  Allergen Name Date Reaction Notes   Cymbalta --  --  --          Family Medical History  Disease Name Relative/Age Notes   Liver Neoplasm, Malignant Mother/   --    Lung Neoplasm, Malignant Mother/   --    Ovary Neoplasm, Malignant Mother/   --    Brain Neoplasm, Benign  --    Stroke Father/   Father   Heart Disease Mother/  Sister/   Mother; Sister   Cancer, Unspecified Brother/  Mother/   Mother; Brother   Diabetes, unspecified type Son/   Son   Colon Cancer  2 cousin (ages 70& 52--maternal side)--still living ages 70& 65   Alzheimer's Disease Father/   passed away age 83 01/2015   Bone Neoplasm, Malignant Mother/   Passed age 83 yrs old 03/03/15   Family history of certain chronic disabling diseases; arthritis Father/   Father   Osteoporosis Sister/   Sister   Family history of Arthritis Father/  Sister/   Father; Sister         Social History  Finding Status Start/Stop Quantity Notes   Alcohol Use --  --/-- --  12/23/2020 - 11/11/2020 - rarely drinks, 1 drink per day, has been drinking for 31 or more years   Claustophobic Unknown --/-- --  yes   . --  --/-- --  --    lives alone --  --/-- --  --    Other Substance Use Never --/-- --  --    Recreational Drug Use Never --/-- --  no  06/30/2020 - no   Retired. --  --/-- --  --    Tobacco Former --/-- --  former smoker  06/30/2020 - former smoker   using vapor 2-3 times per day         Immunizations  NameDate Admin Mfg Trade Name Lot Number Route Inj VIS Given VIS Publication   Iqkwbwddl64/17/2018 SKB Fluarix, quadrivalent, preservative free [id number] IM LD 09/17/2018 08/07/2015   Comments: pt tolerated well, left office in stable condition   Ecxnaldznirq20/20/2014 MSD PNEUMOVAX 23 [id number] IM LD 10/20/2014 02/27/2013   Comments:    Tmzvieqgl9417/15/2019 MSD PNEUMOVAX 23 [id number] IM RD 11/15/2019    Comments: Pt tolerated well,left office in stable condition         Review of Systems  · Constitutional  o Admits  o : fatigue  o Denies  o : fever, weight loss, weight gain  · HENT  o Admits  o : lightheadedness  o Denies  o : headaches, vertigo  · Cardiovascular  o Denies  o : lower extremity edema, claudication, chest pressure, palpitations  · Respiratory  o Denies  o : shortness of breath, wheezing, cough, hemoptysis, dyspnea on exertion  · Gastrointestinal  o Denies  o : nausea, vomiting, diarrhea, constipation, abdominal pain  · Psychiatric  o Denies  o : anxiety, depression, suicidal ideation, homicidal ideation          Assessment  · Essential hypertension     401.9/I10  · Vitamin D deficiency     268.9/E55.9  · Surgery follow-up     V67.00/Z09  · Falls     E888.9/W19.XXXA  · Jerking     781.0/R25.3  · Medication monitoring encounter     V58.83/Z51.81      Plan  · Orders  o Physical, Primary Care Panel (CBC, CMP, Lipid, TSH) [facility] (11471, 96098, 82529, 53719) - - 04/26/2021  o Vitamin D (25-Hydroxy) Level (30011) - - 04/26/2021  o ACO-39: Current medications updated and reviewed (, 1159F) - - 04/26/2021  o Physician Telephone Evaluation, 11-20 minutes (88151) - - 04/26/2021  · Medications  o Vitamin D3 125 mcg (5,000 unit) oral tablet   SIG: take 1 tablet by oral route daily   DISP: (30) Tablet with 5 refills  Prescribed on 04/26/2021     o Lasix 20 mg oral tablet   SIG: take 1 tablet by oral route daily for 30 days   DISP: (30) Tablet with 1  refills  Adjusted on 04/26/2021     o Medications have been Reconciled  o Transition of Care or Provider Policy  · Instructions  o Patient advised to monitor blood pressure (B/P) at home and journal readings. Patient informed that a B/P reading at home of more than 130/80 is considered hypertension. For readings greater zqdk446/90 or higher patient is advised to follow up in the office with readings for management. Patient advised to limit sodium intake.  o Patient was educated/instructed on their diagnosis, treatment and medications prior to discharge from the clinic today.  o Patient instructed to seek medical attention urgently for new or worsening symptoms.  o Call the office with any concerns or questions.  o Chronic conditions reviewed and taken into consideration for today's treatment plan.  o Plan Of Care:   o will refill meds, I'd like to see her in office to get a better idea about her health. She will return when her trazodone is due in 2 weeks, continue therapy and f/u wiht ortho.   · Disposition  o Call or Return if symptoms worsen or persist.            Electronically Signed by: ELADIO Grove -Author on April 26, 2021 02:35:11 PM

## 2021-05-14 NOTE — PROGRESS NOTES
Progress Note      Patient Name: Annetta Malagon   Patient ID: [id number]   Sex: Female   YOB: 1955    Primary Care Provider: Sonia HENDRICKS   Referring Provider: Matt Hoover MD    Visit Date: April 8, 2021    Provider: Stevie Pino PA-C   Location: [facility]   Location Address: [address] [place] [address]  [id number]   Location Phone: [phone]          Chief Complaint  · right hip pain      History Of Present Illness  Annetta Malagon is a 65 year old /White female who presents today to [facility].      Patient presents today for a follow-up of right hip. Patient is s/p right total hip arthroplasty performed on 3/22/2021 by Dr. Mendoza. Patient reports she is doing well but states pain on the lateral aspect of the hip and also occasionally into the groin. Patient also reports she has had a number of falls since surgery but doesnt believe she damaged her hip replacement.       Past Medical History  Anxiety; Arthritis; Bipolar disorder; Bladder disorder; Broken Bones; Chronic pain; Congestive heart failure; Diabetes Mellitus, Type II; GERD (gastroesophageal reflux disease); Hemorrhoids; Hyperlipidemia; Hypertension; Hypothyroid; Insomnia; Insomnia, unspecified; Kidney calculus; Kidney Disease; Limb Swelling; Lung disease; Major depressive disorder; Osteoarthritis; Osteopenia; Reflux Disease; Thyroid Problems; Varicose veins of both lower extremities; Vitamin D deficiency         Past Surgical History  Ankle repair; Appendectomy; Colonoscopy; Gallbladder; Hemorrhoidectomy; Hip Surgery; Hysterectomy; Kidney; Surgical Clips; Tonsillectomy; Tubal ligation         Medication List  ammonium lactate 12 % topical cream; baclofen 10 mg oral tablet; cimetidine 400 mg oral tablet; Crestor 5 mg oral tablet; Flonase Allergy Relief 50 mcg/actuation nasal spray,suspension; gabapentin 800 mg oral tablet; hydroxyzine HCl 50 mg oral tablet; Lasix 20 mg  "oral tablet; Lexapro 20 mg oral tablet; Mobic 15 mg oral tablet; Nexium 40 mg oral capsule,delayed release(DR/EC); oxycodone 5 mg oral tablet; Percocet  mg oral tablet; trazodone 150 mg oral tablet; Vitamin D3 5,000 unit oral tablet; Voltaren 1 % topical gel         Allergy List  Cymbalta       Allergies Reconciled  Family Medical History  Liver Neoplasm, Malignant; Lung Neoplasm, Malignant; Ovary Neoplasm, Malignant; Brain Neoplasm, Benign; Stroke; Heart Disease; Cancer, Unspecified; Diabetes, unspecified type; Colon Cancer; Alzheimer's Disease; Bone Neoplasm, Malignant; Family history of certain chronic disabling diseases; arthritis; Osteoporosis; Family history of Arthritis         Social History  Alcohol Use; Claustophobic (Unknown); .; lives alone; Other Substance Use (Never); Recreational Drug Use (Never); Retired.; Tobacco (Former)         Immunizations  Name Date Admin   Influenza 09/17/2018   Influenza 10/20/2014   Influenza 11/11/2013   Pneumococcal 10/20/2014   Amzfenwmm99 11/15/2019         Review of Systems  · Constitutional  o Denies  o : fever, chills, weight loss  · Cardiovascular  o Denies  o : chest pain, shortness of breath  · Gastrointestinal  o Denies  o : liver disease, heartburn, nausea, blood in stools  · Genitourinary  o Denies  o : painful urination, blood in urine  · Integument  o Denies  o : rash, itching  · Neurologic  o Denies  o : headache, weakness, loss of consciousness  · Musculoskeletal  o Denies  o : painful, swollen joints  · Psychiatric  o Denies  o : drug/alcohol addiction, anxiety, depression      Vitals  Date Time BP Position Site L\R Cuff Size HR RR TEMP (F) WT  HT  BMI kg/m2 BSA m2 O2 Sat FR L/min FiO2 HC       04/08/2021 01:58 PM      76 - R   166lbs 16oz 5'  6\" 26.95 1.88 94 %            Physical Examination  · Constitutional  o Appearance  o : well developed, well-nourished, no obvious deformities present  · Head and Face  o Head  o :   § Inspection  § : " normocephalic  o Face  o :   § Inspection  § : no facial lesions  · Eyes  o Conjunctivae  o : conjunctivae normal  o Sclerae  o : sclerae white  · Ears, Nose, Mouth and Throat  o Ears  o :   § External Ears  § : appearance within normal limits  § Hearing  § : intact  o Nose  o :   § External Nose  § : appearance normal  · Neck  o Inspection/Palpation  o : normal appearance  o Range of Motion  o : full range of motion  · Respiratory  o Respiratory Effort  o : breathing unlabored  o Inspection of Chest  o : normal appearance  o Auscultation of Lungs  o : no audible wheezing or rales  · Cardiovascular  o Heart  o : regular rate  · Gastrointestinal  o Abdominal Examination  o : soft and non-tender  · Skin and Subcutaneous Tissue  o General Inspection  o : intact, no rashes  · Psychiatric  o General  o : Alert and oriented x3  o Judgement and Insight  o : judgment and insight intact  o Mood and Affect  o : mood normal, affect appropriate  · Right Hip  o Inspection  o : Surgical is appreciated to be clean, dry and intact. Surgical staples removed. Neurovascular intact distally. There is a region at the inferior aspect of the surgical site that is hot to touch but otherwise no signs of infection.   · In Office Procedures  o View  o : AP/LATERAL  o Site  o : right, hip  o Indication  o : Right hip pain  o Study  o : X-rays ordered, taken in the office, and reviewed today.  o Xray  o : Demonstrates post-surgical changes of right total hip replacement with no appreciable loosening or appreciable retaining hardware. All other bony alignments are appreciated and anatomic.   o Comparative Data  o : Comparative Data found and reviewed today          Assessment  · Aftercare following right total hip replacement     V54.81  · Right Pain: Hip     719.45/M25.559      Plan  · Orders  o Hip (Right) 2 or more views (includes AP Pelvis) [facility] Preferred View. (76926) - 719.45/M25.559 - 04/08/2021  · Instructions  o Reviewed the patient's  Past Medical, Social, and Family history as well as the ROS at today's visit, no changes.  o Call or return if worsening symptoms.  o Follow Up in 4 weeks.   o This note was transcribed by Catia Tran. ch/mc  o Patient may progressively increase her activities as discussed in clinic today. She is advised she can shower but avoid immersing herself in water until her surgical site has healed. She is due for a follow-up in 4 weeks to asses her post-operative course. Patient understands and agrees with this plan.             Electronically Signed by: Catia Tran-, Other -Author on April 9, 2021 02:08:09 PM  Electronically Co-signed by: Merna Mendoza MD -Reviewer on April 11, 2021 11:11:58 AM  Electronically Co-signed by: MIR Gonzalez-GIEDON -Reviewer on April 13, 2021 12:25:04 PM

## 2021-05-14 NOTE — PROGRESS NOTES
Progress Note      Patient Name: Annetta Malagon   Patient ID: [id number]   Sex: Female   YOB: 1955    Primary Care Provider: Sonia HENDRICKS   Referring Provider: Matt Hoover MD    Visit Date: December 23, 2020    Provider: Matt Hoover MD   Location: [facility]   Location Address: [address] [place] [address]  [id number]   Location Phone: [phone]          Chief Complaint  · Follow Up Surgery      History Of Present Illness  Annetta Malagon is a 65 year old /White female who follows up status post excision of multiple lower back lesions. The patient has done well but she still reports pain from the area as well as her hips.       Past Medical History  Anxiety; Arthritis; Bipolar disorder; Bladder Disorder; Broken Bones; Chronic pain; Congestive heart failure; Depression; Diabetes Mellitus, Type II; GERD (gastroesophageal reflux disease); Hemorrhoids; Hyperlipemia; Hyperlipidemia; Hypertension; Hypothyroid; Insomnia; Insomnia, unspecified; Kidney calculus; Kidney Disease; Limb Swelling; Lung disease; Major depressive disorder; Osteoarthritis; Osteopenia; Reflux; Reflux Disease; Thyroid Problems; Varicose veins of both lower extremities; Vitamin D deficiency         Past Surgical History  Ankle repair; Appendectomy; Colonoscopy; Gallbladder; Hemorrhoidectomy; Hip Surgery; Hysterectomy; Kidney; Surgical Clips; Tonsillectomy; Tubal ligation         Medication List  baclofen 10 mg oral tablet; cimetidine 400 mg oral tablet; Crestor 5 mg oral tablet; gabapentin 800 mg oral tablet; hydroxyzine HCl 50 mg oral tablet; Lasix 20 mg oral tablet; Lexapro 20 mg oral tablet; Mobic 15 mg oral tablet; Nexium 40 mg oral capsule,delayed release(DR/EC); Percocet  mg oral tablet; trazodone 150 mg oral tablet; Vitamin D3 5,000 unit oral tablet; Voltaren 1 % topical gel         Allergy List  Cymbalta       Allergies Reconciled  Family Medical  "History  Liver Neoplasm, Malignant; Lung Neoplasm, Malignant; Ovary Neoplasm, Malignant; Brain Neoplasm, Benign; Stroke; Heart Disease; Cancer, Unspecified; Diabetes, unspecified type; Colon Cancer; Alzheimer's Disease; Bone Neoplasm, Malignant; Family history of certain chronic disabling diseases; arthritis; Osteoporosis; Family history of Arthritis         Social History  Alcohol Use; Claustophobic (Unknown); .; lives alone; Other Substance Use (Never); Recreational Drug Use (Never); Retired.; Tobacco (Former)         Immunizations  Name Date Admin   Influenza 09/17/2018   Influenza 10/20/2014   Influenza 11/11/2013   Pneumococcal 10/20/2014   Dgiaeeemm24 11/15/2019         Review of Systems  · Cardiovascular  o Denies  o : chest pain on exertion, shortness of breath, lower extremity swelling  · Respiratory  o Denies  o : shortness of breath, coughing up blood  · Gastrointestinal  o Denies  o : chronic abdominal pain      Vitals  Date Time BP Position Site L\R Cuff Size HR RR TEMP (F) WT  HT  BMI kg/m2 BSA m2 O2 Sat FR L/min FiO2        12/23/2020 10:56 AM         177lbs 2oz 5'  6\" 28.59 1.93             Physical Examination  · Constitutional  o Appearance  o : well developed, well-nourished, alert and in no acute distress  · Head and Face  o Head  o :   § Inspection  § : no deformities or lesions  · Eyes  o Conjunctivae  o : clear  o Sclerae  o : nonicteric  · Neck  o Inspection/Palpation  o : normal appearance, no masses or tenderness, trachea midline  · Respiratory  o Respiratory Effort  o : breathing unlabored  o Inspection of Chest  o : normal appearance, no retractions  · Cardiovascular  o Heart  o : regular rate and rhythm  · Gastrointestinal  o Abdominal Examination  o :   § Abdomen  § : soft  · Lymphatic  o Neck  o : no lymphadenopathy present  o Axilla  o : no lymphadenopathy present  o Groin  o : no lymphadenopathy present  · Skin and Subcutaneous Tissue  o General Inspection  o : Her hip " excision and lower back excision sites all appear to be healing well. No signs of infection. She does have some seroma areas which she says are causing her pain and she would like those aspirated.   · Neurologic  o Cranial Nerves  o : grossly intact  o Sensation  o : grossly intact  o Gait and Station  o :   § Gait Screening  § : normal gait, able to stand without diffculty  o Cerebellar Function  o : no obvious abnormalities  · Psychiatric  o Judgement and Insight  o : judgment and insight intact  o Mood and Affect  o : mood normal, affect appropriate     IN-OFFICE PROCEDURE:  Aspiration was performed by cleansing the area with alcohol.  Local anesthesia was injected and then an 18-gauge needle was inserted into the cavity and we aspirated the fluid.  The patient tolerated the procedure well.  We aspirated two areas which seemed to be causing her the most pain.  We got about 20 mL of serous fluid from these areas that were in the mid lower back and the right lower back.           Assessment  · Encounter for examination following surgery     V67.00/Z09       Patient is status post excision of multiple lower back lesions with the findings of fat necrosis and multiple lipomas.       Plan  · Medications  o Medications have been Reconciled  o Transition of Care or Provider Policy  · Instructions  o Follow up as needed.  o Electronically Identified Patient Education Materials Provided Electronically     We aspirated two areas which seemed to be causing her the most pain.  We got about 20 mL of serous fluid from these areas that were in the mid lower back and the right lower back.  The aspiration was performed by cleansing the area with alcohol.  Local anesthesia was injected and then an 18-gauge needle was inserted into the cavity and we aspirated the fluid.  The patient tolerated the procedure well.  I will follow up with her again as needed.  Discussed with the patient.  All questions were answered.  She voiced  understanding and agreed to proceed with the above plan.             Electronically Signed by: Danika Richardson-, Other -Author on December 28, 2020 03:25:06 PM  Electronically Co-signed by: Matt Hoover MD -Reviewer on December 28, 2020 08:25:04 PM

## 2021-05-15 VITALS — OXYGEN SATURATION: 97 % | HEART RATE: 82 BPM | WEIGHT: 162 LBS | BODY MASS INDEX: 26.99 KG/M2 | HEIGHT: 65 IN

## 2021-05-15 VITALS — WEIGHT: 159.5 LBS | BODY MASS INDEX: 26.57 KG/M2 | HEIGHT: 65 IN | RESPIRATION RATE: 14 BRPM

## 2021-05-15 VITALS
DIASTOLIC BLOOD PRESSURE: 76 MMHG | OXYGEN SATURATION: 98 % | TEMPERATURE: 97.7 F | RESPIRATION RATE: 16 BRPM | WEIGHT: 162.56 LBS | SYSTOLIC BLOOD PRESSURE: 127 MMHG | HEIGHT: 65 IN | BODY MASS INDEX: 27.09 KG/M2 | HEART RATE: 82 BPM

## 2021-05-15 VITALS — HEIGHT: 67 IN | OXYGEN SATURATION: 95 % | HEART RATE: 64 BPM

## 2021-05-15 VITALS — WEIGHT: 162 LBS | HEART RATE: 72 BPM | BODY MASS INDEX: 26.99 KG/M2 | OXYGEN SATURATION: 98 % | HEIGHT: 65 IN

## 2021-05-15 VITALS
SYSTOLIC BLOOD PRESSURE: 101 MMHG | HEIGHT: 65 IN | WEIGHT: 158.31 LBS | RESPIRATION RATE: 16 BRPM | HEART RATE: 86 BPM | OXYGEN SATURATION: 97 % | BODY MASS INDEX: 26.38 KG/M2 | DIASTOLIC BLOOD PRESSURE: 76 MMHG

## 2021-05-15 VITALS
HEART RATE: 84 BPM | OXYGEN SATURATION: 99 % | RESPIRATION RATE: 16 BRPM | WEIGHT: 160.12 LBS | DIASTOLIC BLOOD PRESSURE: 79 MMHG | HEIGHT: 65 IN | BODY MASS INDEX: 26.68 KG/M2 | TEMPERATURE: 99.1 F | SYSTOLIC BLOOD PRESSURE: 108 MMHG

## 2021-05-15 VITALS — RESPIRATION RATE: 16 BRPM | HEIGHT: 65 IN | BODY MASS INDEX: 25.66 KG/M2 | WEIGHT: 154 LBS

## 2021-05-15 VITALS — BODY MASS INDEX: 26.53 KG/M2 | WEIGHT: 169 LBS | HEIGHT: 67 IN | OXYGEN SATURATION: 97 % | HEART RATE: 88 BPM

## 2021-05-15 VITALS — BODY MASS INDEX: 26.66 KG/M2 | WEIGHT: 160 LBS | RESPIRATION RATE: 16 BRPM | HEIGHT: 65 IN

## 2021-05-15 VITALS
HEART RATE: 71 BPM | BODY MASS INDEX: 26.56 KG/M2 | WEIGHT: 169.25 LBS | HEIGHT: 67 IN | TEMPERATURE: 98 F | OXYGEN SATURATION: 96 % | SYSTOLIC BLOOD PRESSURE: 117 MMHG | DIASTOLIC BLOOD PRESSURE: 83 MMHG | RESPIRATION RATE: 18 BRPM

## 2021-05-15 VITALS — RESPIRATION RATE: 16 BRPM | WEIGHT: 162 LBS | BODY MASS INDEX: 26.99 KG/M2 | HEIGHT: 65 IN

## 2021-05-15 VITALS
WEIGHT: 154.44 LBS | BODY MASS INDEX: 25.73 KG/M2 | RESPIRATION RATE: 20 BRPM | SYSTOLIC BLOOD PRESSURE: 136 MMHG | DIASTOLIC BLOOD PRESSURE: 61 MMHG | HEART RATE: 96 BPM | OXYGEN SATURATION: 97 % | HEIGHT: 65 IN

## 2021-05-15 VITALS — HEIGHT: 65 IN | RESPIRATION RATE: 16 BRPM | WEIGHT: 154 LBS | BODY MASS INDEX: 25.66 KG/M2

## 2021-05-15 VITALS
HEART RATE: 77 BPM | WEIGHT: 162 LBS | HEIGHT: 65 IN | SYSTOLIC BLOOD PRESSURE: 133 MMHG | DIASTOLIC BLOOD PRESSURE: 75 MMHG | BODY MASS INDEX: 26.99 KG/M2 | TEMPERATURE: 97.3 F

## 2021-05-15 VITALS — BODY MASS INDEX: 25.66 KG/M2 | HEIGHT: 65 IN | OXYGEN SATURATION: 96 % | WEIGHT: 154 LBS | HEART RATE: 79 BPM

## 2021-05-15 VITALS — BODY MASS INDEX: 25.83 KG/M2 | HEIGHT: 65 IN | WEIGHT: 155 LBS | RESPIRATION RATE: 14 BRPM

## 2021-05-15 VITALS — WEIGHT: 163 LBS | HEART RATE: 74 BPM | BODY MASS INDEX: 27.16 KG/M2 | HEIGHT: 65 IN | OXYGEN SATURATION: 95 %

## 2021-05-16 VITALS
TEMPERATURE: 99.4 F | OXYGEN SATURATION: 91 % | HEIGHT: 67 IN | DIASTOLIC BLOOD PRESSURE: 69 MMHG | WEIGHT: 183.31 LBS | BODY MASS INDEX: 28.77 KG/M2 | SYSTOLIC BLOOD PRESSURE: 118 MMHG | HEART RATE: 75 BPM | RESPIRATION RATE: 20 BRPM

## 2021-05-16 VITALS
DIASTOLIC BLOOD PRESSURE: 72 MMHG | HEIGHT: 67 IN | HEART RATE: 78 BPM | SYSTOLIC BLOOD PRESSURE: 135 MMHG | TEMPERATURE: 98.8 F | RESPIRATION RATE: 14 BRPM | BODY MASS INDEX: 31.16 KG/M2 | WEIGHT: 198.5 LBS | OXYGEN SATURATION: 97 %

## 2021-05-16 VITALS
SYSTOLIC BLOOD PRESSURE: 129 MMHG | HEART RATE: 83 BPM | TEMPERATURE: 98.5 F | HEIGHT: 67 IN | DIASTOLIC BLOOD PRESSURE: 69 MMHG | OXYGEN SATURATION: 97 % | BODY MASS INDEX: 30.54 KG/M2 | RESPIRATION RATE: 18 BRPM | WEIGHT: 194.56 LBS

## 2021-05-16 VITALS
WEIGHT: 180.56 LBS | HEIGHT: 67 IN | BODY MASS INDEX: 28.34 KG/M2 | RESPIRATION RATE: 16 BRPM | DIASTOLIC BLOOD PRESSURE: 74 MMHG | SYSTOLIC BLOOD PRESSURE: 105 MMHG | OXYGEN SATURATION: 95 % | HEART RATE: 84 BPM | TEMPERATURE: 98.2 F

## 2021-05-16 VITALS
BODY MASS INDEX: 29.88 KG/M2 | OXYGEN SATURATION: 95 % | SYSTOLIC BLOOD PRESSURE: 124 MMHG | HEART RATE: 75 BPM | DIASTOLIC BLOOD PRESSURE: 72 MMHG | HEIGHT: 67 IN | TEMPERATURE: 97.9 F | WEIGHT: 190.37 LBS | RESPIRATION RATE: 16 BRPM

## 2021-05-16 VITALS
BODY MASS INDEX: 31.11 KG/M2 | RESPIRATION RATE: 14 BRPM | SYSTOLIC BLOOD PRESSURE: 121 MMHG | DIASTOLIC BLOOD PRESSURE: 72 MMHG | HEART RATE: 80 BPM | TEMPERATURE: 99.4 F | HEIGHT: 67 IN | WEIGHT: 198.19 LBS | OXYGEN SATURATION: 96 %

## 2021-05-16 VITALS
OXYGEN SATURATION: 96 % | SYSTOLIC BLOOD PRESSURE: 130 MMHG | DIASTOLIC BLOOD PRESSURE: 72 MMHG | HEIGHT: 67 IN | BODY MASS INDEX: 28.25 KG/M2 | HEART RATE: 71 BPM | RESPIRATION RATE: 16 BRPM | TEMPERATURE: 97.5 F | WEIGHT: 180 LBS

## 2021-05-23 ENCOUNTER — TRANSCRIBE ORDERS (OUTPATIENT)
Dept: FAMILY MEDICINE CLINIC | Facility: CLINIC | Age: 66
End: 2021-05-23

## 2021-05-23 DIAGNOSIS — Z12.31 SCREENING MAMMOGRAM, ENCOUNTER FOR: Primary | ICD-10-CM

## 2021-05-26 ENCOUNTER — PATIENT OUTREACH - CONVERTED (OUTPATIENT)
Dept: FAMILY MEDICINE CLINIC | Facility: CLINIC | Age: 66
End: 2021-05-26
Attending: NURSE PRACTITIONER

## 2021-06-05 NOTE — OUTREACH NOTE
Quick Note      Patient Name: Annetta Malagon   Patient ID: [id number]   Sex: Female   YOB: 1955    Primary Care Provider: Sonia HENDRICKS   Referring Provider: Matt Hoover MD    Visit Date: May 26, 2021    Provider: ELADIO Grove   Location: [facility]   Location Address: [address] [place], [place] [address]ie moe  [place], [place]  [id number]   Location Phone: [phone]          History Of Present Illness     [facility]     TaskID: [id number]    Task Subject: CCM notes May 2021 RECONSENTED-NEW    Task Comments:    Date: May 26 2021 10:50AM     Creator: ty  Called [facility] and spoke with Debbie and she reports Sonia is listed as ordering provider and the discharge notes form therapy and home health will come to our office. All goals met and would healed.  (4 minutes)    Date: May 26 2021 10:43AM     Creator: ty  Called to check on patient, doing well, P.T. just left and they dismissed her. She still has the knot but ortho said it will go away eventually. Doing well on bowel movements and no needs at this time. (3 minutes)    Date: May 12 2021  1:30PM     Creator: ty  [facility] monthly plan of care initiated. Note almost finished and computer went down and lost note and had to restart. (charting 17 minutes)    Date: May  7 2021  3:43PM     Creator: ty  Per message with ANDREA Brady she reports PCP sent in medication and patient needs to go to ER if that constipated, I called patient to inform and she is aware of new medication and instructions and she is aware of directions to go to ER but reports she just went to bathroom for a BM and does not feel need to go to ER. Task back to PCP about this. (7 minutes)    Date: May  7 2021 12:12PM     Creator: ty  Call to [facility] [employer] [phone] and spoke with Lori in the pharmacy devision. Informed we never received anything about her Amitiza not being covered and provider needs to know what medication (other than OTC meds that  do not work for her) would be covered under her plan for constipation. She reviewed orders and her policy and informs me that 4/29/2021 the script was denied and on 5/3/2021 they tried to run for the generic of Lubiprostone and is was denied as well. She reports that either Movantik or Relistor are covered under her plan. I sent a task to PCP to request medication change. (15 minutes)    Date: May  7 2021 11:55AM     Creator: ty  Call to [facility] and they are going to fax over her J&J vaccine information to us. I was informed that her Amitiza was denied and that we need to inform Sonia to see if she can change to somehting different. Patient reported she had only had one good BM in over 6 weeks and needs her medication. I will call insurance to see what would be covered. (7 minutes)    Date: May  7 2021 11:48AM     Creator: ty  (cont) she had removed from her back previously. She has [facility] home health nurses and physical therapy but OT signed off. She stopped at [employer] the other day and only made it down 2 isles and had to go back out to the car due to pain in her muscle on left thigh and incision line down to her knee. She reports once she sat in car she was ok. She has a rollator walker but had to use the cart and could not just rest or sit. She recieved her COVID 19 vaccine at [facility] on 5/3/2021. I will call for that immunization to update her chart. (call and charting 26 minutes)    Date: May  7 2021 11:43AM     Creator: ty  Called patient per request of PCP due to new hip surgery and having issues with falling now. Seeing orthopedics and has home health, PCP goals is home safety and no falling. She was previous CCM patient and met all goals and was discharged. Paitient very familiar of program and eager to restart. Phone consent obtained with double wittness. She reports her goals are to get stronger with her therapy, she is having a lot of consitpation from her pain medication and  she is needing her stool softner to be approved by insurance. She reports when they called her on her lab work she told the office but still had not heard anything and the pharmacy reports they need paperwork from PCP signed for a PA. I did not see anything in computer on her Amitiza for preauthorization or notes from office. I told her I will check with insurance and let her know. Her incision line on her left hip and her thigh is hard as a brick she reports and DR. Mendoza said this is no fluid in there. She reports it feels the same as those cysts                   Electronically Signed by: Sahara Zarate RN -Author on May 26, 2021 10:54:39 AM

## 2021-06-05 NOTE — OUTREACH NOTE
Quick Note      Patient Name: Annetta Malagon   Patient ID: [id number]   Sex: Female   YOB: 1955    Primary Care Provider: Sonia HENDRICKS   Referring Provider: Matt Hoover MD    Visit Date: May 26, 2021    Provider: ELADIO Grove   Location: [facility]   Location Address: [address] [place] [address]  [id number]   Location Phone: [phone]          History Of Present Illness  CCM Comprehensive Care Plan    This Chronic Medical Management Care Plan for Annetta Malagon, 65 year old /White female, has been monitored and managed, and a new plan of care implemented, and established for the month of May. A cumulative time of 79 minutes was spent on this patient record, including face to face with provider, phone call with patient, phone call with pharmacist, electronic communication with primary care provider, and chart review.   Regarding the patient's diagnoses Anxiety, Chronic pain, Hyperlipidemia, Hypertension, Major depressive disorder, Osteoarthritis, and falls post hip replacemen, the following items were adressed: medical records, medications, and changes to medical care and any changes can be found within the plan section of the note. A detailed listing of time spent for chronic care management has been scanned into the patient's electronic record. Current medications include: ammonium lactate 12 % topical cream, baclofen 10 mg oral tablet, cimetidine 400 mg oral tablet, Crestor 5 mg oral tablet, cyclobenzaprine 10 mg oral tablet, Flonase Allergy Relief 50 mcg/actuation nasal spray,suspension, gabapentin 800 mg oral tablet, hydroxyzine HCl 50 mg oral tablet, Lasix 20 mg oral tablet, Lexapro 20 mg oral tablet, Mobic 15 mg oral tablet, Movantik 25 mg oral tablet, Nexium 40 mg oral capsule,delayed release(DR/EC), oxycodone 5 mg oral tablet, Percocet  mg oral tablet, trazodone 150 mg oral tablet, Vitamin D3 125 mcg (5,000  unit) oral tablet, and Voltaren 1 % topical gel and the patient is reported to be compliant with medication protocol. Medications are reported to be non-effective in controlling symptoms and changes have been made to the medication protocol.   The patient was monitored remotely for activity level and mood and behavior, home safey for a period of 6 minutes.   This patient's physical needs include: medication education, physical healthcare, and DME supplies. Current with home health and needs use of rollator walker for support and safety. Educated on medications for constipation.   Patient's mental support needs include: continued support. Depression and anxiety well controlled at this time.   The patient's cognitive support needs are currently being met.   The patient's psychosocial support needs include:, need for increased support and coordination of community providers. CCM team assisting with needs for medications and working with pharmacy to get covered medication under her plan for constipation. Nurse Navigator reached out to home health for discharge plan of care and continuity of care as patient has completed therapy and goals met.   This patient's functional needs are N/A.   This patient's environmental needs are N/A.           Assessment  · Chronic Care Management (CCM)     V68.89/Z02.89  · Anxiety     300.02/F41.1  · Chronic pain     338.29/G89.29  · Major depressive disorder     296.20/F32.2  · Osteoarthritis     715.90/M19.90  · Hypertension     401.9/I10  · Falls     E888.9/W19.XXXA  · Constipation     564.00/K59.00      Plan  · Orders  o 2 - Chronic care management, non-complex, extra 20 mins, non-Medicare (77419) - V68.89/Z02.89, 300.02/F41.1, E888.9/W19.XXXA, 564.00/K59.00 - 05/12/2021  · Instructions  o Patient's Health Care Goals: To get this knot out of my hip or thigh area, to get my bowels under control with a medicine I can afford from pharmacy, and to get up and moving more.  o Provider's  Health Care Goals: Home safety from falling and medication compliance.   o Patient was provided an electronic copy of care plan  o CCM services were explained and offered and patient has accepted these services.  o Patient has given their written consent to recieve CCM services and understands that this includes the authorization of electronic communication of medical information with other treating providers.  o Patient understands that they may stop CCM services at any time and these changes will be effective at the end of the calendar month and will effectively revocate the agreement of CCM services.  o Patient understands that only one practioner can furnish and be paid for CCM services during one calendar month. Patient also understands that there may be co-payment or deductible fees in association with CCM services.  o Patient will continue with at least monthly follow-up calls with the Nurse Navigator.  · Associate Tasks  o Task ID [id number] CCM: CCM notes May 2021 RECONSENTED-NEW            Electronically Signed by: Sahara Zarate RN -Author on May 26, 2021 10:53:52 AM

## 2021-06-05 NOTE — PROGRESS NOTES
Progress Note      Patient Name: Annetta Malagon   Patient ID: [id number]   Sex: Female   YOB: 1955    Primary Care Provider: Sonia HENDRICKS   Referring Provider: Matt Hoover MD    Visit Date: May 6, 2021    Provider: Stevie Pino PA-C   Location: [facility]   Location Address: [address] [place] [address]  [id number]   Location Phone: [phone]          Chief Complaint  · Follow up right hip replacement      History Of Present Illness  Annetta Malagon is a 65 year old /White female who presents today to [facility].      Patient follows up today for right KALE performed 3/22/2021 by Dr. Mendoza.  Patient reports pain has improved since the time of surgery, but the patient continues to have some pain issues as well as muscle spasms.  Patient has been adherent to interventions and instructions.    Patient is currently under the care of a pain management provider.  It is agreed that the patient will receive a refill of narcotic pain medications which will be the last prescription from this practice.  Pain management will be handed off to her pain management provider.       Past Medical History  Anxiety; Arthritis; Bipolar disorder; Bladder disorder; Broken Bones; Chronic pain; Congestive heart failure; Diabetes Mellitus, Type II; GERD (gastroesophageal reflux disease); Hemorrhoids; Hyperlipidemia; Hypertension; Hypothyroid; Insomnia; Insomnia, unspecified; Kidney calculus; Kidney Disease; Limb Swelling; Lung disease; Major depressive disorder; Osteoarthritis; Osteopenia; Reflux Disease; Thyroid Problems; Varicose veins of both lower extremities; Vitamin D deficiency         Past Surgical History  Ankle repair; Appendectomy; Colonoscopy; Gallbladder; Hemorrhoidectomy; Hip Surgery; Hysterectomy; Kidney; Surgical Clips; Tonsillectomy; Tubal ligation         Medication List  ammonium lactate 12 % topical cream; baclofen 10 mg oral tablet;  cimetidine 400 mg oral tablet; Crestor 5 mg oral tablet; cyclobenzaprine 10 mg oral tablet; Flonase Allergy Relief 50 mcg/actuation nasal spray,suspension; gabapentin 800 mg oral tablet; hydroxyzine HCl 50 mg oral tablet; Lasix 20 mg oral tablet; Lexapro 20 mg oral tablet; Mobic 15 mg oral tablet; Movantik 25 mg oral tablet; Nexium 40 mg oral capsule,delayed release(DR/EC); oxycodone 5 mg oral tablet; Percocet  mg oral tablet; trazodone 150 mg oral tablet; Vitamin D3 125 mcg (5,000 unit) oral tablet; Voltaren 1 % topical gel         Allergy List  Cymbalta         Family Medical History  Liver Neoplasm, Malignant; Lung Neoplasm, Malignant; Ovary Neoplasm, Malignant; Brain Neoplasm, Benign; Stroke; Heart Disease; Cancer, Unspecified; Diabetes, unspecified type; Colon Cancer; Alzheimer's Disease; Bone Neoplasm, Malignant; Family history of certain chronic disabling diseases; arthritis; Osteoporosis; Family history of Arthritis         Social History  Alcohol Use; Claustophobic (Unknown); .; lives alone; Other Substance Use (Never); Recreational Drug Use (Never); Retired.; Tobacco (Former)         Immunizations  Name Date Admin   Influenza 09/17/2018   Influenza 10/20/2014   Influenza 11/11/2013   Pneumococcal 10/20/2014   [id number] 11/15/2019         Review of Systems  · Constitutional  o Denies  o : fever, chills, weight loss  · Cardiovascular  o Denies  o : chest pain, shortness of breath  · Gastrointestinal  o Denies  o : liver disease, heartburn, nausea, blood in stools  · Genitourinary  o Denies  o : painful urination, blood in urine  · Integument  o Denies  o : rash, itching  · Neurologic  o Denies  o : headache, weakness, loss of consciousness  · Musculoskeletal  o Denies  o : painful, swollen joints  · Psychiatric  o Denies  o : drug/alcohol addiction, anxiety, depression      Vitals  Date Time BP Position Site L\R Cuff Size HR RR TEMP (F) WT  HT  BMI kg/m2 BSA m2 O2 Sat FR L/min FiO2       "  05/06/2021 02:11 PM      45 - R   168lbs 0oz 5'  6\" 27.12 1.88 82 %            Physical Examination  · Constitutional  o Appearance  o : well developed, well-nourished, no obvious deformities present  · Head and Face  o Head  o :   § Inspection  § : normocephalic  o Face  o :   § Inspection  § : no facial lesions  · Eyes  o Conjunctivae  o : conjunctivae normal  o Sclerae  o : sclerae white  · Ears, Nose, Mouth and Throat  o Ears  o :   § External Ears  § : appearance within normal limits  § Hearing  § : intact  o Nose  o :   § External Nose  § : appearance normal  · Neck  o Inspection/Palpation  o : normal appearance  o Range of Motion  o : full range of motion  · Respiratory  o Respiratory Effort  o : breathing unlabored  o Inspection of Chest  o : normal appearance  o Auscultation of Lungs  o : no audible wheezing or rales  · Cardiovascular  o Heart  o : regular rate  · Gastrointestinal  o Abdominal Examination  o : soft and non-tender  · Skin and Subcutaneous Tissue  o General Inspection  o : intact, no rashes  · Psychiatric  o General  o : Alert and oriented x3  o Judgement and Insight  o : judgment and insight intact  o Mood and Affect  o : mood normal, affect appropriate  · Right Hip  o Inspection  o : Surgical site is appreciated be healing appropriately with good scar formation that is supple. Patient demonstrates full functional range of motion is able to transition from sit to stand with minimal assistance. Neurovascularly intact distally.          Assessment  · Aftercare following right hip joint replacement surgery       Aftercare following joint replacement surgery     V54.81/Z47.1  Presence of right artificial hip joint     V54.81/Z96.641  · Right Pain: Hip     719.45/M25.559      Plan  · Instructions  o Reviewed the patient's Past Medical, Social, and Family history as well as the ROS at today's visit, no changes.  o Call or return if worsening symptoms.  o Patient may progressively return to her " activities as tolerated. Patient is to follow-up in 6 weeks with x-rays performed at that time 2 views right hip.  o Portions of this note were generated with voice recognition software. While efforts have been made to proofread the text, some sound alike errors may still persist.             Electronically Signed by: Stevie Pino PA-C -Author on May 16, 2021 03:57:28 PM

## 2021-06-22 ENCOUNTER — PATIENT OUTREACH (OUTPATIENT)
Dept: CASE MANAGEMENT | Facility: OTHER | Age: 66
End: 2021-06-22

## 2021-06-22 ENCOUNTER — OFFICE VISIT (OUTPATIENT)
Dept: ORTHOPEDIC SURGERY | Facility: CLINIC | Age: 66
End: 2021-06-22

## 2021-06-22 VITALS — OXYGEN SATURATION: 97 % | HEART RATE: 68 BPM | HEIGHT: 66 IN | WEIGHT: 162 LBS | BODY MASS INDEX: 26.03 KG/M2

## 2021-06-22 DIAGNOSIS — Z47.89 AFTERCARE FOLLOWING SURGERY OF THE MUSCULOSKELETAL SYSTEM: ICD-10-CM

## 2021-06-22 DIAGNOSIS — I10 HYPERTENSION, UNSPECIFIED TYPE: Primary | ICD-10-CM

## 2021-06-22 DIAGNOSIS — M25.551 RIGHT HIP PAIN: Primary | ICD-10-CM

## 2021-06-22 PROCEDURE — 99490 CHRNC CARE MGMT STAFF 1ST 20: CPT | Performed by: NURSE PRACTITIONER

## 2021-06-22 PROCEDURE — 99024 POSTOP FOLLOW-UP VISIT: CPT | Performed by: PHYSICIAN ASSISTANT

## 2021-06-22 PROCEDURE — 99439 CHRNC CARE MGMT STAF EA ADDL: CPT | Performed by: NURSE PRACTITIONER

## 2021-06-22 RX ORDER — CHOLECALCIFEROL (VITAMIN D3) 1250 MCG
CAPSULE ORAL
COMMUNITY
Start: 2021-03-27 | End: 2021-11-01 | Stop reason: SDUPTHER

## 2021-06-22 RX ORDER — CYCLOBENZAPRINE HCL 10 MG
10 TABLET ORAL 3 TIMES DAILY PRN
Qty: 90 TABLET | Refills: 1 | Status: SHIPPED | OUTPATIENT
Start: 2021-06-22 | End: 2021-09-24 | Stop reason: SDUPTHER

## 2021-06-22 RX ORDER — OXYCODONE HYDROCHLORIDE 10 MG/1
TABLET ORAL
Status: ON HOLD | COMMUNITY
End: 2022-01-01

## 2021-06-22 RX ORDER — ASPIRIN 81 MG/1
81 TABLET, CHEWABLE ORAL DAILY
COMMUNITY

## 2021-06-22 RX ORDER — TRAZODONE HYDROCHLORIDE 150 MG/1
TABLET ORAL
COMMUNITY
Start: 2021-05-21 | End: 2021-08-26 | Stop reason: SDUPTHER

## 2021-06-22 RX ORDER — GABAPENTIN 800 MG/1
TABLET ORAL EVERY 8 HOURS SCHEDULED
Status: ON HOLD | COMMUNITY
End: 2022-01-01

## 2021-06-22 RX ORDER — ROSUVASTATIN CALCIUM 10 MG/1
TABLET, COATED ORAL EVERY 24 HOURS
COMMUNITY
End: 2021-08-26 | Stop reason: SDUPTHER

## 2021-06-22 RX ORDER — ESCITALOPRAM OXALATE 20 MG/1
TABLET ORAL
COMMUNITY
End: 2021-07-26 | Stop reason: SDUPTHER

## 2021-06-22 RX ORDER — FUROSEMIDE 20 MG/1
TABLET ORAL
COMMUNITY
End: 2021-08-10 | Stop reason: SDUPTHER

## 2021-06-22 RX ORDER — SERTRALINE HYDROCHLORIDE 100 MG/1
TABLET, FILM COATED ORAL
COMMUNITY
End: 2021-07-26

## 2021-06-22 RX ORDER — TRIAMCINOLONE ACETONIDE 1 MG/G
CREAM TOPICAL
COMMUNITY
End: 2021-01-01 | Stop reason: SDUPTHER

## 2021-06-22 RX ORDER — NITROGLYCERIN 0.4 MG/1
TABLET SUBLINGUAL
COMMUNITY
End: 2021-01-01

## 2021-06-22 RX ORDER — HYDROCODONE BITARTRATE AND ACETAMINOPHEN 7.5; 325 MG/1; MG/1
TABLET ORAL
COMMUNITY
End: 2021-01-01

## 2021-06-22 RX ORDER — MELOXICAM 15 MG/1
TABLET ORAL
COMMUNITY
End: 2021-09-22 | Stop reason: SDUPTHER

## 2021-06-22 RX ORDER — AMMONIUM LACTATE 12 G/100G
CREAM TOPICAL
COMMUNITY
End: 2021-01-01 | Stop reason: SDUPTHER

## 2021-06-22 RX ORDER — PHENTERMINE HYDROCHLORIDE 37.5 MG/1
37.5 TABLET ORAL DAILY
Status: ON HOLD | COMMUNITY
Start: 2021-03-29 | End: 2022-01-01

## 2021-06-22 RX ORDER — NALOXEGOL OXALATE 25 MG/1
TABLET, FILM COATED ORAL
COMMUNITY
Start: 2021-05-07 | End: 2021-07-26

## 2021-06-22 RX ORDER — CYCLOBENZAPRINE HCL 10 MG
TABLET ORAL
COMMUNITY
Start: 2021-05-07 | End: 2021-06-22 | Stop reason: SDUPTHER

## 2021-06-22 RX ORDER — TOPIRAMATE 100 MG/1
TABLET, FILM COATED ORAL
COMMUNITY
End: 2021-11-01 | Stop reason: SDUPTHER

## 2021-06-22 RX ORDER — ACETAMINOPHEN 325 MG/1
650 TABLET ORAL EVERY 4 HOURS PRN
COMMUNITY
End: 2022-01-01 | Stop reason: HOSPADM

## 2021-06-22 RX ORDER — FLUTICASONE PROPIONATE 50 MCG
SPRAY, SUSPENSION (ML) NASAL
Status: ON HOLD | COMMUNITY
Start: 2021-03-23 | End: 2022-01-01

## 2021-06-22 RX ORDER — OXYCODONE AND ACETAMINOPHEN 10; 325 MG/1; MG/1
TABLET ORAL
Status: ON HOLD | COMMUNITY
End: 2022-01-01

## 2021-06-22 NOTE — PROGRESS NOTES
"Chief Complaint  Pain of the Right Hip    Subjective          Annetta Malagon presents to [facility] for right hip pain following right KALE performed 3/22/2021 by Dr. Mendoza she says pain has improved in the right hip but is still there.  She reports frequent falls that were causing her to fall on her hip roughly 1 month ago.  She states she switched muscle relaxers and has not had any falls since, currently taking Flexeril, used to take Robaxin.  States she also takes hydrocodone and ibuprofen occasionally.  She presents using a cane today.  Not currently doing any home exercises or physical therapy.    Objective   Vital Signs:   Pulse 68   Ht 167.6 cm (66\")   Wt 73.5 kg (162 lb)   SpO2 97%   BMI 26.15 kg/m²       Physical Exam  Constitutional:       Appearance: Normal appearance. He is well-developed and normal weight.   HENT:      Head: Normocephalic.      Right Ear: Hearing and external ear normal.      Left Ear: Hearing and external ear normal.      Nose: Nose normal.   Eyes:      Conjunctiva/sclera: Conjunctivae normal.   Cardiovascular:      Rate and Rhythm: Normal rate.   Pulmonary:      Effort: Pulmonary effort is normal.      Breath sounds: No wheezing or rales.   Abdominal:      Palpations: Abdomen is soft.      Tenderness: There is no abdominal tenderness.   Musculoskeletal:      Cervical back: Normal range of motion.   Skin:     Findings: No rash.   Neurological:      Mental Status: He is alert and oriented to person, place, and time.   Psychiatric:         Mood and Affect: Mood and affect normal.         Judgment: Judgment normal.     Ortho Exam  Right hip: Well-healed surgical incision.  Full flexion and extension.  Mild pain with internal and external rotation.  Gait within normal limits, nonantalgic.  Neurovascularly intact.  Dorsalis pedis pulses 2+ bilaterally.  Full range of motion right knee ankle and digits on right lower extremity.  Result Review :        "     Imaging Results (Most Recent)     Procedure Component Value Units Date/Time    XR Hip With or Without Pelvis 2 - 3 View Right [[id number]] Resulted: 06/22/21 1546     Updated: 06/22/21 1547    Narrative:      X-Ray Report:  Study: X-rays ordered, taken in the office, and reviewed today  Site: Right hip xray  Indication: Pain, history KALE  View: AP and Lateral view(s)  Findings: Hardware intact, no acute osseous abnormalities, malalignment or   soft tissue swelling  Prior studies available for comparison: yes                   Assessment and Plan    Problem List Items Addressed This Visit        Musculoskeletal and Injuries    Right hip pain - Primary    Current Assessment & Plan     Patient doing well.  Given prescription for Voltaren gel to try for pain relief.  Follow-up in 3 months for recheck 6 months postop.         Relevant Medications    cyclobenzaprine (FLEXERIL) 10 MG tablet    Other Relevant Orders    XR Hip With or Without Pelvis 2 - 3 View Right (Completed)    Aftercare following surgery of the musculoskeletal system          Follow Up   Return in about 3 months (around 9/22/2021) for Recheck.  Patient Instructions   Home exercises advised, Voltaren gel prescribed, follow-up in 3 months for your 6-month postop appointment.    Patient was given instructions and counseling regarding her condition or for health maintenance advice. Please see specific information pulled into the AVS if appropriate.

## 2021-06-22 NOTE — OUTREACH NOTE
Ambulatory Case Management Note    CCM Interim Update    Called patient for monthly call, she is doing better. We discussed her previous goals and she is on the constipation medication Movantik 25 mg daily in the morning and it is covered by insurance but not noticing much difference and reports only 2 bowel movements since March when she had surgery. I encouraged follow up with provider or going to ER and she reports she feels ok and like it is just because she is not eating much. Will route encounter/message to PCP to see if a dosage can be increased. She refuses to have AWV scheduled at this time, last AWV was 11/18/2019 and she reports she will call office and schedule on her own once not babysitting. Mammogram is also due and she reports Francisco at office is aware to schedule her in the Fall when not babysitting. Colonoscopy done 2017, up to date until 2022. Denies any falls at home since last call. Knot in thigh improving and sees Dr. Mendoza today. She would like Sonia to take over her Mobic prescription once she no longer needs to see orthopedics any longer. She will discuss with her. She also reports she got a new battery for her blood pressure machine and will keep a log for the next 2 weeks and we will follow up. Her PHQ-9 was elevated at 17 for her score and she is on  Lexapro and Trazadone for sleep. She continues her Percocet for chronic pain and may need to get a new opoid induced constipation medication for this.     General & Health Literacy Assessment    Questions/Answers      Most Recent Value   Assessment Completed With  Patient   Living Arrangement  Alone   Type of Residence  Apartment   Home Care Services  No   Equiptment Used at Home  Cane, Walker, Tub Seat   Communication Device  No   Bed or Wheelchair Confined  No   Difficulty Keeping Appointments  No   Jewish or Spiritual Beliefs that Impact Treatment  No   Chronic Pain  Yes   Location of Chronic Pain  lower back and legs and both hands     Chronic Pain Timing  Constant   Chronic Pain Severity  5   Limitation of Routine Activities Due to Chronic Pain  Moderate   How often do you have someone help you read hospital materials?  Never   How often do you have problems learning about your medical condition because of difficulty understanding written information?  Never   How often do you have a problem understanding what is told to you about your medical condition?  Never   How confident are you filling out medical forms by yourself?  Extremely   Health Literacy  Excellent        Care Evaluation    Questions/Answers      Most Recent Value   Annual Wellness Visit:   Patient Will Schedule   Care Gaps Addressed  Colon Cancer Screening, Flu Shot, Mammogram, Pneumonia Vaccine   Colon Cancer Screening Type  Colonoscopy   Colonoscopy Status  Up to Date (< 10 yrs)   Colon Cancer Screening Completion at [facility] or Other  [facility]   Colon Cancer Screening Comments  per patient needs because needed every 5 years and will schedule   Flu Shot Status  Up to Date   Mammogram Status  Patient will Complete   Mammogram Completion at [facility] or Other  [facility]   Mammogram Comments  patient will complete in fall when no longer babysitting   Pneumonia Vaccine Status  Up to Date   Pneumonia Vaccine Completion at [facility] or Other  [facility]   Other Patient Education/Resources   [facility]   [employer] Education Method  Verbal [patient has no computer or e mail currently not interested]   Advanced Directives:  Patient Has [she has the paperwork and filling out with [employer]]   Medication Adherence  N/A   Healthy Lifestyle (Self-Efficacy)  self-monitors vital signs appropriately, correctly recognizes signs/symptoms of cardiac distress, correctly recognizes signs/symptoms of pulmonary distress, recognizes when to stop activity, recognizes when to contact medical assistance, self-reports important symptoms to medical professional        SDOH updated and reviewed with the patient during this  encounter:     Financial Resource Strain: Medium Risk   • Difficulty of Paying Living Expenses: Somewhat hard       Physical Activity: Insufficiently Active   • Days of Exercise per Week: 1 day   • Minutes of Exercise per Session: 20 min       Food Insecurity: Food Insecurity Present   • Worried About Running Out of Food in the Last Year: Sometimes true   • Ran Out of Food in the Last Year: Sometimes true       Social Connections: Socially Isolated   • Frequency of Communication with Friends and Family: Once a week   • Frequency of Social Gatherings with Friends and Family: Once a week   • Attends Orthodox Services: Never   • Active Member of Clubs or Organizations: No   • Attends Club or Organization Meetings: Never   • Marital Status:        Transportation Needs: No Transportation Needs   • Lack of Transportation (Medical): No   • Lack of Transportation (Non-Medical): No       Housing Stability: High Risk   • Unable to Pay for Housing in the Last Year: Yes   • Number of Places Lived in the Last Year: 1   • Unstable Housing in the Last Year: No       Stress: Stress Concern Present   • Feeling of Stress : Very much           Care Plan: Hypertension   Updates made since 6/22/2021 12:00 AM      Problem: BLOOD PRESSURE MONITORING       Goal: Establish Plan for Regular Lab Work    Start Date: 6/22/2021   Expected End Date: 9/22/2021   This Visit's Progress: On track      Task: Track patient LDL and HDL levels    Responsible User: Sahara Zarate RN      Task: Track patient serum potassium and serum creatinine levels    Responsible User: Sahara Zarate RN      Task: Review the patients last urine protein. Discuss need if patient has not had one.    Responsible User: Sahara Zarate RN      Task: Review patient's last ECHO or EKG. Discuss need if patient has not had one.    Responsible User: Sahara Zarate RN      Goal: Establish Regular Follow-Ups with PCP       Task: Refer patient to PCP    Responsible User:  Sahara Zarate RN   Note:    Discussed AWV she will call to schedule     Task: Determine patient's next PCP visit    Responsible User: Sahara Zarate RN      Task: Discuss schedule for PCP visits with patient    Responsible User: Sahara Zarate RN      Problem: PATIENT IS HYPERTENSIVE       Goal: Remain at or Below Target Blood Pressure       Task: Establish a target blood pressure with the patient in conjunction with PCP    Responsible User: Saahra Zarate RN      Task: Discuss steps to manage BP with patient    Responsible User: Sahara Zarate RN      Task: Educate on disease process and complications associated with noncompliance Completed 6/22/2021   Responsible User: Sahara Zarate RN      Task: Educate on keeping a log    Due Date: 7/6/2021   Priority: Routine   Responsible User: Sahara Zarate RN   Note:    Patient will start blood pressure log for 2 weeks and f/u with          Sahara Zarate RN  Ambulatory Case Management    6/22/2021, 10:39 EDT

## 2021-06-22 NOTE — ASSESSMENT & PLAN NOTE
Patient doing well.  Given prescription for Voltaren gel to try for pain relief.  Follow-up in 3 months for recheck 6 months postop.

## 2021-06-22 NOTE — PATIENT INSTRUCTIONS
Home exercises advised, Voltaren gel prescribed, follow-up in 3 months for your 6-month postop appointment.

## 2021-06-29 ENCOUNTER — PATIENT OUTREACH (OUTPATIENT)
Dept: CASE MANAGEMENT | Facility: OTHER | Age: 66
End: 2021-06-29

## 2021-06-29 DIAGNOSIS — G89.29 OTHER CHRONIC PAIN: ICD-10-CM

## 2021-06-29 DIAGNOSIS — F32.2 MAJOR DEPRESSIVE DISORDER, SEVERE (HCC): ICD-10-CM

## 2021-06-29 DIAGNOSIS — I10 HYPERTENSION, UNSPECIFIED TYPE: Primary | ICD-10-CM

## 2021-06-29 NOTE — OUTREACH NOTE
[facility] End of Month Documentation    This Chronic Medical Management Care Plan for Annetta Malagon, 65 y.o. female, has been monitored and managed;a new plan of care implemented;revised and a new plan of care implemented for the month of June.  A cumulative time of 53  minutes was spent on this patient record this month, including phone call with patient;face to face with provider;chart review.    Regarding the patient's problems: does not have a problem list on file., the following items were addressed: medications;changes to medical care, Initiated care plan this month with patient and any changes can be found within the plan section of the note.  A detailed listing of time spent for chronic care management is tracked within each outreach encounter.  Current medications include:  currently has no medications in their medication list. and the patient is reported to be patient is compliant with medication protocol,  Medications are reported to be effective, reports minimal bowel movements will discuss with PCP at follow up and encouraged to go to ED if needed.   The patient was monitored remotely for activity level;mood & behavior, No issues this month doing beter with ambulation and no falls. .    The patient's physical needs include:  needs met.     The patient's mental support needs include:  continued support, Doing well this month with current medicaitons.    The patient's cognitive support needs include:  needs met    The patient's psychosocial support needs include:  continued support, Doing well with her thigh and hip pain and ambulating better this month.     The patient's functional needs include: needs met, none    The patient's environmental needs include:  none    Refer to previous outreach notes for more information on the areas listed above.    Monthly Billing Diagnoses  Encounter Diagnoses   Name Primary?   • Hypertension, unspecified type Yes   • Other chronic pain    • Major depressive disorder,  severe (CMS/HCC)        Medications   · Medications have been reconciled with [facility] medication list verbal with patient.     Care Plan progress this month:      Recently Modified Care Plans Updates made since 5/29/2021 12:00 AM     Hypertension         Problem Priority Last Modified     BLOOD PRESSURE MONITORING --  6/22/2021 10:34 AM by Sahara Zarate RN              Goal Recent Progress Last Modified     Establish Plan for Regular Lab Work On track  6/22/2021 10:35 AM by Sahara Zarate RN              Task Due Date Last Modified     Track patient LDL and HDL levels --  6/22/2021 10:34 AM by Sahara Zarate RN        Track patient serum potassium and serum creatinine levels --  6/22/2021 10:34 AM by Sahara Zarate RN        Review the patients last urine protein. Discuss need if patient has not had one. --  6/22/2021 10:34 AM by Sahara Zarate RN        Review patient's last ECHO or EKG. Discuss need if patient has not had one. --  6/22/2021 10:34 AM by Sahara Zarate RN              Goal Recent Progress Last Modified     Establish Regular Follow-Ups with PCP --  6/22/2021 10:34 AM by Sahara Zarate RN              Task Due Date Last Modified     Refer patient to PCP --  6/22/2021 10:36 AM by Sahara Zarate RN     Discussed AWV she will call to schedule       Determine patient's next PCP visit --  6/22/2021 10:34 AM by Sahara Zarate RN        Discuss schedule for PCP visits with patient --  6/22/2021 10:34 AM by Sahara Zarate RN              Problem Priority Last Modified     PATIENT IS HYPERTENSIVE --  6/22/2021 10:34 AM by Sahara Zarate RN              Goal Recent Progress Last Modified     Remain at or Below Target Blood Pressure --  6/22/2021 10:34 AM by Sahara Zarate RN              Task Due Date Last Modified     Establish a target blood pressure with the patient in conjunction with PCP --  6/22/2021 10:34 AM by Sahara Zarate RN        Discuss steps to manage BP with patient --  6/22/2021  10:34 AM by Sahara Zarate RN        Educate on disease process and complications associated with noncompliance --  6/22/2021 10:36 AM by Sahara Zarate RN        Educate on keeping a log 7/6/2021 6/22/2021 10:37 AM by Sahara Zarate RN     Patient will start blood pressure log for 2 weeks and f/u with                      Instructions   · Patient was provided an electronic copy of care plan  · CCM services were explained and offered and patient has accepted these services.  · Patient has given their written consent to receive CCM services and understands that this includes the authorization of electronic communication of medical information with the other treating providers.  · Patient understands that they may stop CCM services at any time and these changes will be effective at the end of the calendar month and will effectively revocate the agreement of CCM services.  · Patient understands that only one practitioner can furnish and be paid for CCM services during one calendar month.  Patient also understands that there may be co-payment or deductible fees in association with CCM services.  · Patient will continue with at least monthly follow-up calls with the Nurse Navigator.    Sahara Zarate RN  Ambulatory Case Management    6/29/2021, 16:41 EDT

## 2021-07-13 ENCOUNTER — PATIENT OUTREACH (OUTPATIENT)
Dept: CASE MANAGEMENT | Facility: OTHER | Age: 66
End: 2021-07-13

## 2021-07-13 DIAGNOSIS — I10 HYPERTENSION, UNSPECIFIED TYPE: Primary | ICD-10-CM

## 2021-07-13 DIAGNOSIS — F32.2 MAJOR DEPRESSIVE DISORDER, SEVERE (HCC): ICD-10-CM

## 2021-07-13 DIAGNOSIS — G89.29 OTHER CHRONIC PAIN: ICD-10-CM

## 2021-07-13 NOTE — OUTREACH NOTE
Ambulatory Case Management Note    CCM Interim Update    I spoke with PCP and her blood pressure parameters for patient are less than 140 systolic and less than 90 diastolic. She also reports last she spoke with her she was somewhat agitated. I will follow up.     Called patient to discuss blood pressure and labs and no answer, left message for call back.         There are no recently modified care plans to display for this patient.      Sahara Zarate RN  Ambulatory Case Management    7/13/2021, 12:35 EDT

## 2021-07-15 VITALS — HEIGHT: 66 IN | HEART RATE: 45 BPM | WEIGHT: 168 LBS | OXYGEN SATURATION: 82 % | BODY MASS INDEX: 27 KG/M2

## 2021-07-20 ENCOUNTER — PATIENT OUTREACH (OUTPATIENT)
Dept: CASE MANAGEMENT | Facility: OTHER | Age: 66
End: 2021-07-20

## 2021-07-20 DIAGNOSIS — I10 HYPERTENSION, UNSPECIFIED TYPE: Primary | ICD-10-CM

## 2021-07-20 DIAGNOSIS — G89.29 OTHER CHRONIC PAIN: ICD-10-CM

## 2021-07-20 NOTE — OUTREACH NOTE
Ambulatory Case Management Note    CCM Interim Update    I called patient for her follow up monthly call at her home phone and no answer, left a message that I was going to try her cell number and gave her my new work cell on her machine message.     I called her cell and she is at home in the parking lot in the car and about to go into the house. She is babysitting or was. She reports she does not have he log with her but she is checking her blood pressures and they are about 120's over 80 and nothing higher than 140 over 2 weeks. She feels it is looking good. She reports she fell about 9 times over the past 2 weeks and she is not sure what is going on. She checked her blood pressures and all readings were normal. She has had to call her neighbor a few times to help her up and that is while she is using her walker. She reports she has seen orthopedics and Dr. Mendoza's office wants her to start outpatient rehab or therapy and she is not wanting to do that. She is unsure if she blacks out or not but she thinks her legs just give out. She reports home health has her doing home exercises now. They are no longer coming. I informed her that Sonia will want to see her or have an intervention about the falls and she reports she does not want anything done at this time.     Her constipation is still active and the Movantik 25 mg daily she is taking is not helping. She did report she is taking OTC Colace as well. No other medication changes from her [place] medication list. I asked if when she sees the pain management provider can she ask them what they recommend for opoid induced constipation or use for it. She reports Linzess but her insurance does not cover it. I will call [facility] pharmacy again and see if there are other medications or ask PCP if her current medication can be increased. I called [facility] and per Haley she has filled her medication on 5/7/21, 6/9/21, and 7/9/21, so she is taking it. I asked  if they knew of anything else covered by insurance but they do not and referred me to [facility]. Per research the 25mg daily dose is only recommended dose and should not be increases as it can cause GI issues.     Per chart review of call to [facility] 5/7/2021 her insurance covers Relistor 150 mg tablets for opoid induced constipation as well and the recommended dose is 3 tablets, 450 mg daily. Message to PCP to see about ordering and letting her know about her falls.        Care Plan: Hypertension   Updates made since 7/20/2021 12:00 AM      Problem: BLOOD PRESSURE MONITORING       Goal: Establish Plan for Regular Lab Work    Start Date: 6/22/2021   Expected End Date: 9/22/2021   This Visit's Progress: On track      Task: Track patient LDL and HDL levels Completed 7/20/2021   Outcome: Positive   Responsible User: Sahara Zarate RN   Note:    4/30/21 HDL 71 and LDL 80     Task: Track patient serum potassium and serum creatinine levels Completed 7/20/2021   Outcome: Positive   Responsible User: Sahara Zarate RN   Note:    4/30/21 Potassium 3.8 and Creatnine elevated at 1.020 (previously 0.720 on 3/8/21)     Goal: Establish Regular Follow-Ups with PCP       Task: Discuss schedule for PCP visits with patient    Responsible User: Sahara Zarate RN   Note:    Discussed need for follow up today at [facility] call due to falls at home, patient does not want to be seen at this time.      Problem: PATIENT IS HYPERTENSIVE       Goal: Remain at or Below Target Blood Pressure per /90 or less    This Visit's Progress: On track   Note:    Discussed with PCP and her range is 140/90 or less  Per discussion with patient she is around 120/80 on average but was not able to give me a full log of readings.      Task: Educate on keeping a log Completed 7/20/2021   Due Date: 7/6/2021   Priority: Routine   Responsible User: Sahara Zarate RN   Note:    Patient will start blood pressure log for 2 weeks and f/u with Case  "manager  Patient gave a few verbal readings but reports she is \"normal\" and was not at the house to give a full of of readings.          Sahara Zarate RN  Ambulatory Case Management    7/20/2021, 11:51 EDT    "

## 2021-07-23 ENCOUNTER — PATIENT OUTREACH (OUTPATIENT)
Dept: CASE MANAGEMENT | Facility: OTHER | Age: 66
End: 2021-07-23

## 2021-07-23 DIAGNOSIS — T40.2X5A CONSTIPATION DUE TO OPIOID THERAPY: ICD-10-CM

## 2021-07-23 DIAGNOSIS — K59.03 CONSTIPATION DUE TO OPIOID THERAPY: ICD-10-CM

## 2021-07-23 DIAGNOSIS — G89.29 OTHER CHRONIC PAIN: Primary | ICD-10-CM

## 2021-07-23 NOTE — OUTREACH NOTE
Ambulatory Case Management Note    CCM Interim Update    Received in basket message from PCP about the message I sent on patient falls and needing her medication for her opiode induced constipation changed and ELADIO Buck wants her scheduled for an office visit appointment ASAP for a complex 30 minute slot to see her and discuss issues. I called patient on her home and cell numbers both and left detailed messaged and request for call back, or to call office for appointment. PCP notified.     Patient called me back and is wanting an appointment for PCP. She wanted to know what all I told her to have her want to make an appointment. I said my note discussed the constipation and fall issues she was having. That she had fallen 9 times in the past 2 weeks and that she needed a different medication for her constipation. She agreed to a 30 minute appointment but needs to be after 3 pm as she baby sits every day. I scheduled her for Monday 7/26/2021 at 3:30 pm, PCP updated. She had opened slots for today but patient could not come in those times.       Care Plan: Hypertension   Updates made since 7/23/2021 12:00 AM      Problem: BLOOD PRESSURE MONITORING       Goal: Establish Regular Follow-Ups with PCP       Task: Refer patient to PCP Completed 7/23/2021   Outcome: Positive   Responsible User: Sahara Zarate RN   Note:    Discussed AWV she will call to schedule    Has f/u with PCP 7/26     Task: Determine patient's next PCP visit Completed 7/23/2021   Outcome: Positive   Responsible User: Sahara Zarate RN   Note:    Will come in for complex visit on 7/26 per PCP request for recent falls         Sahara Zarate RN  Ambulatory Case Management    7/23/2021, 10:44 EDT

## 2021-07-26 ENCOUNTER — OFFICE VISIT (OUTPATIENT)
Dept: FAMILY MEDICINE CLINIC | Facility: CLINIC | Age: 66
End: 2021-07-26

## 2021-07-26 VITALS
DIASTOLIC BLOOD PRESSURE: 88 MMHG | WEIGHT: 155.1 LBS | HEART RATE: 81 BPM | OXYGEN SATURATION: 92 % | SYSTOLIC BLOOD PRESSURE: 132 MMHG | TEMPERATURE: 97.9 F | BODY MASS INDEX: 24.93 KG/M2 | HEIGHT: 66 IN

## 2021-07-26 DIAGNOSIS — M16.11 ARTHRITIS OF RIGHT HIP: ICD-10-CM

## 2021-07-26 DIAGNOSIS — F31.77 BIPOLAR DISORDER, IN PARTIAL REMISSION, MOST RECENT EPISODE MIXED (HCC): ICD-10-CM

## 2021-07-26 DIAGNOSIS — K59.03 DRUG-INDUCED CONSTIPATION: ICD-10-CM

## 2021-07-26 DIAGNOSIS — Z96.641 HISTORY OF RIGHT HIP REPLACEMENT: ICD-10-CM

## 2021-07-26 DIAGNOSIS — E78.2 MIXED HYPERLIPIDEMIA: ICD-10-CM

## 2021-07-26 DIAGNOSIS — M51.37 DEGENERATION OF LUMBOSACRAL INTERVERTEBRAL DISC: ICD-10-CM

## 2021-07-26 DIAGNOSIS — F33.1 MODERATE EPISODE OF RECURRENT MAJOR DEPRESSIVE DISORDER (HCC): ICD-10-CM

## 2021-07-26 DIAGNOSIS — F51.01 PRIMARY INSOMNIA: ICD-10-CM

## 2021-07-26 DIAGNOSIS — I50.9 CHRONIC CONGESTIVE HEART FAILURE, UNSPECIFIED HEART FAILURE TYPE (HCC): ICD-10-CM

## 2021-07-26 DIAGNOSIS — I10 ESSENTIAL HYPERTENSION: ICD-10-CM

## 2021-07-26 DIAGNOSIS — R29.6 FREQUENT FALLS: Primary | ICD-10-CM

## 2021-07-26 PROBLEM — G89.29 CHRONIC PAIN: Status: ACTIVE | Noted: 2019-05-17

## 2021-07-26 PROBLEM — M81.0 AGE RELATED OSTEOPOROSIS: Status: ACTIVE | Noted: 2021-07-26

## 2021-07-26 PROBLEM — F31.9 BIPOLAR DISORDER (HCC): Status: ACTIVE | Noted: 2021-07-26

## 2021-07-26 PROBLEM — K64.9 HEMORRHOIDS: Status: ACTIVE | Noted: 2021-07-26

## 2021-07-26 PROBLEM — I83.93 VARICOSE VEINS OF BOTH LOWER EXTREMITIES: Status: ACTIVE | Noted: 2018-08-30

## 2021-07-26 PROBLEM — F41.9 ANXIETY: Status: ACTIVE | Noted: 2021-07-26

## 2021-07-26 PROBLEM — Z96.649 HISTORY OF HIP REPLACEMENT: Status: ACTIVE | Noted: 2021-07-26

## 2021-07-26 PROBLEM — M19.90 OSTEOARTHRITIS: Status: ACTIVE | Noted: 2019-11-15

## 2021-07-26 PROBLEM — E55.9 VITAMIN D DEFICIENCY: Status: ACTIVE | Noted: 2018-08-30

## 2021-07-26 PROBLEM — G56.03 BILATERAL CARPAL TUNNEL SYNDROME: Status: ACTIVE | Noted: 2021-07-26

## 2021-07-26 PROBLEM — M47.816 LUMBAR SPONDYLOSIS: Status: ACTIVE | Noted: 2018-02-02

## 2021-07-26 PROBLEM — G62.9 NEUROPATHY: Status: ACTIVE | Noted: 2021-07-26

## 2021-07-26 PROBLEM — K21.9 GASTROESOPHAGEAL REFLUX DISEASE WITHOUT ESOPHAGITIS: Status: ACTIVE | Noted: 2018-08-30

## 2021-07-26 PROBLEM — F32.9 MAJOR DEPRESSIVE DISORDER: Status: ACTIVE | Noted: 2019-05-17

## 2021-07-26 PROBLEM — E03.9 HYPOTHYROID: Status: ACTIVE | Noted: 2018-08-30

## 2021-07-26 PROBLEM — M16.9 OSTEOARTHRITIS OF HIP: Status: ACTIVE | Noted: 2018-09-07

## 2021-07-26 PROBLEM — M47.817 LUMBOSACRAL SPONDYLOSIS WITHOUT MYELOPATHY: Status: ACTIVE | Noted: 2021-07-26

## 2021-07-26 PROBLEM — IMO0002 CHRONIC MIGRAINE: Status: ACTIVE | Noted: 2021-07-26

## 2021-07-26 PROBLEM — E11.9 DIABETES MELLITUS, TYPE II (HCC): Status: ACTIVE | Noted: 2021-07-26

## 2021-07-26 PROBLEM — N32.9 BLADDER DISORDER: Status: ACTIVE | Noted: 2021-07-26

## 2021-07-26 PROCEDURE — 99214 OFFICE O/P EST MOD 30 MIN: CPT | Performed by: NURSE PRACTITIONER

## 2021-07-26 RX ORDER — ESCITALOPRAM OXALATE 10 MG/1
10 TABLET ORAL DAILY
Qty: 30 TABLET | Refills: 0 | Status: SHIPPED | OUTPATIENT
Start: 2021-07-26 | End: 2021-11-01 | Stop reason: SDUPTHER

## 2021-07-26 RX ORDER — METHYLNALTREXONE BROMIDE 150 MG/1
3 TABLET ORAL DAILY
Qty: 90 TABLET | Refills: 1 | Status: SHIPPED | OUTPATIENT
Start: 2021-07-26 | End: 2021-01-01

## 2021-07-26 NOTE — PROGRESS NOTES
"Chief Complaint  Fall    Subjective       History of Present Illness  Annetta Malagon presents to [facility]     Has been having frequent falls, the last one was 2 weeks ago. In the last month, she has fallen 9 times.  She has fallen 11 times since her hip surgery in March. She thinks a baclofen got into her planner, \"because I probably didn't take it. They've messed me up with my medicine, I had an extra day so I took it.\" She is not using a cane today but says she does sometimes \"when I feel unstable.\" She has canes, walkers and rollator at home.     She blacked her eye this month because she got up and was dizzy and grabbed the portable toilet and it fell with her.     She is seeing pain management for her chronic pain, they recommended she go to physical therapy.     \"I've been in bed for a week, I didn't want to come here.\"    Depression, not doing well. She says the medicines dont work for her. Cymbalta made her suicidal. Doesn't want to try wellbutrin. \"I feel like that commercial where that woman is holding up a cardboard smiley face.\" She denies SI.    Constipation- linzess wasn't covered, movantik isn't working. This month she has tried an enema, a bottle of movantik and something else that's liquid otc for constipation. She has had about 3 BMs since march. She eats good. Yesterday she says she had filet mignon and corn on the cob.      Vaccinated against covid.   She has not had colonoscopy, she says she will call dr pino.   She has mammo scheduled.   She is opposed to annual wellness visit at this time.         Past Medical History:   • Acid reflux disease   • Anxiety   • Arthritis   • Bipolar disorder (CMS/HCC)   • Bladder disorder   • Broken bones   • CHF (congestive heart failure) (CMS/HCC)   • Chronic pain   • Claustrophobia   • GERD (gastroesophageal reflux disease)   • Hemorrhoids   • HLD (hyperlipidemia)   • HTN (hypertension)   • Hypothyroid   • Insomnia   • " Insomnia, unspecified   • Kidney calculus   • Kidney disease   • Limb swelling   • Lung disease   • Major depressive disorder   • Osteoarthritis   • Osteopenia   • Thyroid trouble   • Type 2 diabetes mellitus (CMS/HCC)   • Varicose veins of both lower extremities   • Vitamin D deficiency       Allergies  Duloxetine hcl    Past Surgical History:   • ANKLE SURGERY   • APPENDECTOMY   • COLONOSCOPY   • GALLBLADDER SURGERY   • HEMORRHOIDECTOMY   • HIP SURGERY    Broke   • HYSTERECTOMY   • KIDNEY SURGERY   • OTHER SURGICAL HISTORY    Surgical Clips   • TONSILLECTOMY   • TUBAL ABDOMINAL LIGATION       Social History     Tobacco Use   • Smoking status: Former Smoker   • Smokeless tobacco: Never Used   Vaping Use   • Vaping Use: Never used   Substance Use Topics   • Alcohol use: Yes     Comment: rarely   • Drug use: Never       Family History   Problem Relation Age of Onset   • Liver cancer Mother    • Lung cancer Mother    • Ovarian cancer Mother    • Heart disease Mother    • Cancer Mother         unspecified   • Bone cancer Mother         Passed away at age 83 3/3/15   • Brain cancer Mother         Benign   • Alzheimer's disease Father         Passed away at age 83 1/2015   • Arthritis Father    • Stroke Father    • Osteoporosis Sister    • Arthritis Sister    • Heart disease Sister    • Colon cancer Maternal Cousin         2 cousins maternal side age 70 & 52 still living ages 70 and 65   • Diabetes Son         Health Maintenance Due   Topic Date Due   • URINE MICROALBUMIN  Never done   • HEPATITIS C SCREENING  Never done   • ANNUAL WELLNESS VISIT  Never done   • DIABETIC FOOT EXAM  Never done   • DIABETIC EYE EXAM  Never done   • LIPID PANEL  06/22/2021          Current Outpatient Medications:   •  acetaminophen (TYLENOL) 325 MG tablet, acetaminophen 325 mg tablet  TAKE TWO TABLETS BY MOUTH EVERY 4 HOURS AS NEEDED FOR mild PAIN, DO not exceed 3 grams/day, Disp: , Rfl:   •  ammonium lactate (AMLACTIN) 12 % cream, ammonium  lactate 12 % topical cream  apply to the affected area EVERY DAY, Disp: , Rfl:   •  Cholecalciferol (Vitamin D3) 1.25 MG (36017 UT) capsule, TAKE ONE CAPSULE BY MOUTH every sunday, Disp: , Rfl:   •  cyclobenzaprine (FLEXERIL) 10 MG tablet, Take 1 tablet by mouth 3 (Three) Times a Day As Needed for Muscle Spasms., Disp: 90 tablet, Rfl: 1  •  Diclofenac Sodium (Voltaren) 1 % gel gel, Apply 4 g topically to the appropriate area as directed 4 (Four) Times a Day As Needed (PAIN)., Disp: 50 g, Rfl: 1  •  fluticasone (FLONASE) 50 MCG/ACT nasal spray, instill 2 sprays in each nostril every day AS NEEDED, Disp: , Rfl:   •  furosemide (LASIX) 20 MG tablet, furosemide 20 mg tablet  TAKE ONE TABLET BY MOUTH ONCE DAILY, Disp: , Rfl:   •  gabapentin (NEURONTIN) 800 MG tablet, Every 8 (Eight) Hours., Disp: , Rfl:   •  HYDROcodone-acetaminophen (NORCO) 7.5-325 MG per tablet, hydrocodone-acetaminophen 7.5-325 mg oral tablet take 1 tablet by oral route every 6 hours as needed for pain   Suspended, Disp: , Rfl:   •  oxyCODONE (ROXICODONE) 10 MG tablet, oxycodone 10 mg tablet, Disp: , Rfl:   •  oxyCODONE-acetaminophen (PERCOCET)  MG per tablet, oxycodone-acetaminophen 10 mg-325 mg tablet  TAKE ONE TABLET BY MOUTH EVERY 6 HOURS AS NEEDED, Disp: , Rfl:   •  phentermine (ADIPEX-P) 37.5 MG tablet, Take 37.5 mg by mouth Daily., Disp: , Rfl:   •  rosuvastatin (CRESTOR) 10 MG tablet, Daily., Disp: , Rfl:   •  topiramate (TOPAMAX) 100 MG tablet, topiramate 100 mg tablet  TAKE ONE TABLET EVERY DAY, Disp: , Rfl:   •  traZODone (DESYREL) 150 MG tablet, trazodone 150 mg oral tablet take 1 tablet by oral route once a day (at bedtime) for 90 days 5/21/2021  Active, Disp: , Rfl:   •  aspirin 81 MG chewable tablet, aspirin 81 mg oral tablet,chewable chew 1 tablet (81 mg) by oral route once daily   Suspended, Disp: , Rfl:   •  escitalopram (LEXAPRO) 10 MG tablet, Take 1 tablet by mouth Daily., Disp: 30 tablet, Rfl: 0  •  meloxicam (MOBIC) 15 MG  tablet, meloxicam 15 mg tablet, Disp: , Rfl:   •  Methylnaltrexone Bromide (Relistor) 150 MG tablet, Take 450 mg by mouth Daily., Disp: 90 tablet, Rfl: 1  •  nitroglycerin (Nitrostat) 0.4 MG SL tablet, Nitrostat 0.4 mg sublingual tablet, sublingual place 1 tablet (0.4 mg) by buccal route at the first sign of an attack; no more than 3 tabs are recommended within a 15 minute period.   Suspended, Disp: , Rfl:   •  triamcinolone (KENALOG) 0.1 % cream, triamcinolone acetonide 0.1 % topical cream, Disp: , Rfl:     Immunization History   Administered Date(s) Administered   • COVID-19 (AUTUMN) 05/03/2021   • Influenza, Unspecified 09/17/2018   • Pneumococcal Polysaccharide (PPSV23) 10/20/2014, 11/15/2019       Objective     Vitals:    07/26/21 1601   BP: 132/88   Pulse:    Temp:    SpO2:      Body mass index is 25.05 kg/m².     Physical Exam  Vitals reviewed.   Constitutional:       Appearance: Normal appearance. She is well-developed.   HENT:      Head: Normocephalic and atraumatic.      Right Ear: External ear normal.      Left Ear: External ear normal.      Mouth/Throat:      Pharynx: No oropharyngeal exudate.   Eyes:      Conjunctiva/sclera: Conjunctivae normal.      Pupils: Pupils are equal, round, and reactive to light.   Neck:      Thyroid: No thyroid mass, thyromegaly or thyroid tenderness.      Vascular: No carotid bruit or JVD.      Trachea: Trachea normal.   Cardiovascular:      Rate and Rhythm: Normal rate and regular rhythm.      Heart sounds: No murmur heard.   No friction rub. No gallop.    Pulmonary:      Effort: Pulmonary effort is normal.      Breath sounds: Normal breath sounds. No wheezing or rhonchi.   Abdominal:      General: Bowel sounds are normal. There is no distension.      Palpations: Abdomen is soft.      Tenderness: There is no abdominal tenderness.   Musculoskeletal:      Right lower leg: No edema.      Left lower leg: No edema.   Lymphadenopathy:      Cervical: No cervical adenopathy.    Skin:     General: Skin is warm and dry.   Neurological:      Mental Status: She is alert and oriented to person, place, and time.      Cranial Nerves: No cranial nerve deficit.   Psychiatric:         Mood and Affect: Mood and affect normal.         Behavior: Behavior normal.         Thought Content: Thought content normal.         Judgment: Judgment normal.           Result Review :    The following data was reviewed by: ELADIO Grove on 07/26/2021:    Common labs    Common Labsle 3/23/21 3/23/21 3/24/21 3/24/21 4/30/21    0419 0419 0431 0431    Glucose  104 (A)  125 (A) 104 (A)   BUN  14  12 17   Creatinine  0.72  0.65 1.02 (A)   Sodium  134 (A)  137 142   Potassium  3.9  3.3 (A) 3.8   Chloride  101  103 101   Calcium  8.0 (A)  8.4 (A) 9.1   Albumin     3.9   Total Bilirubin     0.28   Alkaline Phosphatase     104   AST (SGOT)     19   ALT (SGPT)     11   WBC   6.06  6.80   Hemoglobin 10.3 (A)  9.7 (A)  12.8   Hematocrit 32.9 (A)  30.3 (A)  41.3   Platelets   138  286   Total Cholesterol     175   Triglycerides     118   HDL Cholesterol     71 (A)   LDL Cholesterol      80   (A) Abnormal value       Comments are available for some flowsheets but are not being displayed.                           Assessment and Plan     Diagnoses and all orders for this visit:    1. Frequent falls (Primary)  Comments:  she doesn't want to do therapy right now, she doesn't have the money to get there, she is doing exercises at home.  She is agreeable to walking with a cane     2. Drug-induced constipation  Assessment & Plan:  Getting worse, will change medicine and re eval in a month      3. History of right hip replacement  Comments:  she has a f/u appt with dr mckeon coming up    4. Chronic congestive heart failure, unspecified heart failure type (CMS/HCC)  Comments:  Stable, she no longer sees cardiology    5. Essential hypertension  Assessment & Plan:  Hypertension is improving with treatment.  Continue current  treatment regimen.  Blood pressure will be reassessed in 4 weeks.      6. Mixed hyperlipidemia  Assessment & Plan:  Lipid abnormalities are stable.  Pharmacotherapy as ordered.  Lipids will be reassessed in 3 months.      7. Primary insomnia  Assessment & Plan:  Stable, improving, re eval 3 months      8. Degeneration of lumbosacral intervertebral disc  Assessment & Plan:  Seeing pain management      9. Moderate episode of recurrent major depressive disorder (CMS/HCC)  Assessment & Plan:  Patient's depression is recurrent and is moderate without psychosis. Their depression is currently active and the condition is unchanged. This will be reassessed in 4 weeks. F/U as described:recommended psych and she declines now      10. Bipolar disorder, in partial remission, most recent episode mixed (CMS/HCC)    11. Arthritis of right hip  Assessment & Plan:  Managed by Dr. Mendoza status post hip replacement      Other orders  -     escitalopram (LEXAPRO) 10 MG tablet; Take 1 tablet by mouth Daily.  Dispense: 30 tablet; Refill: 0  -     Methylnaltrexone Bromide (Relistor) 150 MG tablet; Take 450 mg by mouth Daily.  Dispense: 90 tablet; Refill: 1          Follow Up     Return in about 4 weeks (around 8/23/2021) for Recheck.    Patient was given instructions and counseling regarding her condition or for health maintenance advice. Please see specific information pulled into the AVS if appropriate.     Annetta Malagon  reports that she has quit smoking. She has never used smokeless tobacco.     She agrees to call dr pino THIS WEEK to talk about colonoscopy.  She reports that she has only had 3 bowel movements in the last 3 months but her abdominal exam is normal and she is in no distress.  She is agreeable to go back on Lexapro with close follow-up in a month.  I suggested she see psychiatry or therapist and she refuses both.  Other chronic conditions were reviewed today and at this time no medication changes need to be made  for those.  I reviewed labs from April and they were within normal limits.    Sonia Ann, APRN

## 2021-07-26 NOTE — ASSESSMENT & PLAN NOTE
Patient's depression is recurrent and is moderate without psychosis. Their depression is currently active and the condition is unchanged. This will be reassessed in 4 weeks. F/U as described:recommended psych and she declines now

## 2021-07-27 ENCOUNTER — PATIENT OUTREACH (OUTPATIENT)
Dept: CASE MANAGEMENT | Facility: OTHER | Age: 66
End: 2021-07-27

## 2021-07-27 DIAGNOSIS — G89.29 OTHER CHRONIC PAIN: Primary | ICD-10-CM

## 2021-07-27 DIAGNOSIS — T40.2X5A CONSTIPATION DUE TO OPIOID THERAPY: ICD-10-CM

## 2021-07-27 DIAGNOSIS — K59.03 CONSTIPATION DUE TO OPIOID THERAPY: ICD-10-CM

## 2021-07-27 PROCEDURE — 99490 CHRNC CARE MGMT STAFF 1ST 20: CPT | Performed by: NURSE PRACTITIONER

## 2021-07-27 PROCEDURE — 99439 CHRNC CARE MGMT STAF EA ADDL: CPT | Performed by: NURSE PRACTITIONER

## 2021-07-27 NOTE — OUTREACH NOTE
[facility] End of Month Documentation    This Chronic Medical Management Care Plan for Annetta Malagon, 65 y.o. female, has been monitored and managed;reviewed;revised and a new plan of care implemented for the month of July.  A cumulative time of 57  minutes was spent on this patient record this month, including face to face with provider;phone call with patient;phone call with pharmacist;chart review, Per PCP she is depressed and she feels this is reasoning for falls and made adjustments to medications.    Regarding the patient's problems: does not have a problem list on file., the following items were addressed: medical records;medications;referrals to community service providers, Offered f/u with PCP due to falls, she refuses at this time, offered referral for therapy and she reports she is doing her home exercises and does not need therapy at home or outpatient and any changes can be found within the plan section of the note.  A detailed listing of time spent for chronic care management is tracked within each outreach encounter.  Current medications include:  currently has no medications in their medication list. and the patient is reported to be patient is compliant with medication protocol, Called pharmacy and she is getting her medicaitons for constipation filled on regular basis, was deferred to [facility] pharmacy for another covered medication,  Medications are reported to be non-effective in controlling symptoms and changes have been made to the medication protocol, Message to PCP and will call [employer].      The patient was monitored remotely for blood pressure;activity level, Home safety and falls but patient refuses a follow up with PCP or therapy.    The patient's physical needs include:  physical healthcare, Has reported 9 falls in 2 weeks, recommended f/u with PCP but does not want that. She is using her walker and still falling, Orthopedics referred for outpatient rehab and therapy per discussion with patient  and she refuses at this time.     The patient's mental support needs include:  continued support;coordination of community providers, Attempted to get therpy back in home, will discuss again at next CCM call an she did come for a follow up with PCP, charts morked for merge and visit is in other MR number chart    The patient's cognitive support needs include:  needs met    The patient's psychosocial support needs include:  continued support, Denies needs and using her walker but falling. Reports groggy headed at times. PCP updated.     The patient's functional needs include: physical healthcare;physician referral, Dino wright to see PCP and came in for visit    The patient's environmental needs include:  none    Care Plan overall comments:  No data recorded    Refer to previous outreach notes for more information on the areas listed above.    Monthly Billing Diagnoses  (G89.29) Other chronic pain    (K59.03,  T40.2X5A) Constipation due to opioid therapy    Medications   · Medications have been reconciled at visit yesterday in MR [id number] which is marked for merge with this MR case number as well    Care Plan progress this month:      Recently Modified Care Plans Updates made since 6/26/2021 12:00 AM     Hypertension         Problem Priority Last Modified     BLOOD PRESSURE MONITORING --  6/22/2021 10:34 AM by Sahara Zarate RN              Goal Recent Progress Last Modified     Establish Plan for Regular Lab Work On track  6/22/2021 10:35 AM by Sahara Zarate RN              Task Due Date Last Modified     Track patient LDL and HDL levels --  7/20/2021 11:48 AM by Sahara Zarate RN     4/30/21 HDL 71 and LDL 80       Track patient serum potassium and serum creatinine levels --  7/20/2021 11:50 AM by Sahara Zarate RN     4/30/21 Potassium 3.8 and Creatnine elevated at 1.020 (previously 0.720 on 3/8/21)             Goal Recent Progress Last Modified     Establish Regular Follow-Ups with PCP --  6/22/2021  "10:34 AM by Sahara Zarate RN              Task Due Date Last Modified     Refer patient to PCP --  7/23/2021 10:43 AM by Sahara Zarate RN     Discussed AWV she will call to schedule    Has f/u with PCP 7/26       Determine patient's next PCP visit --  7/23/2021 10:43 AM by Sahara Zarate RN     Will come in for complex visit on 7/26 per PCP request for recent falls       Discuss schedule for PCP visits with patient --  7/20/2021 11:44 AM by Sahara Zarate RN     Discussed need for follow up today at CCM call due to falls at home, patient does not want to be seen at this time.              Problem Priority Last Modified     PATIENT IS HYPERTENSIVE --  6/22/2021 10:34 AM by Sahara Zarate RN              Goal Recent Progress Last Modified     Remain at or Below Target Blood Pressure per /90 or less On track  7/20/2021 11:42 AM by Sahara Zarate RN     Discussed with PCP and her range is 140/90 or less  Per discussion with patient she is around 120/80 on average but was not able to give me a full log of readings.              Task Due Date Last Modified     Educate on keeping a log 7/6/2021 7/20/2021 11:42 AM by Sahara Zarate RN     Patient will start blood pressure log for 2 weeks and f/u with   Patient gave a few verbal readings but reports she is \"normal\" and was not at the house to give a full of of readings.                      ·     Instructions   · Patient was provided an electronic copy of care plan  · CCM services were explained and offered and patient has accepted these services.  · Patient has given their written consent to receive CCM services and understands that this includes the authorization of electronic communication of medical information with the other treating providers.  · Patient understands that they may stop CCM services at any time and these changes will be effective at the end of the calendar month and will effectively revocate the agreement of CCM " services.  · Patient understands that only one practitioner can furnish and be paid for CCM services during one calendar month.  Patient also understands that there may be co-payment or deductible fees in association with CCM services.  · Patient will continue with at least monthly follow-up calls with the Nurse Navigator.    Sahara Zarate RN  Ambulatory Case Management    7/27/2021, 13:27 EDT   bleeding presumed from radiation cystitis, low grade noninvasive bladder CA diagnosed recently  management per urology - s/p Alum. 12/5 cysto, clot evac, fulguration and instillation of formalin and restarted on alum, 12/11/19 CT urogram without evidence of bladder perforation, management per uro

## 2021-08-06 ENCOUNTER — TELEPHONE (OUTPATIENT)
Dept: FAMILY MEDICINE CLINIC | Facility: CLINIC | Age: 66
End: 2021-08-06

## 2021-08-10 ENCOUNTER — PATIENT OUTREACH (OUTPATIENT)
Dept: CASE MANAGEMENT | Facility: OTHER | Age: 66
End: 2021-08-10

## 2021-08-10 DIAGNOSIS — I10 HYPERTENSION, UNSPECIFIED TYPE: ICD-10-CM

## 2021-08-10 DIAGNOSIS — T40.2X5A CONSTIPATION DUE TO OPIOID THERAPY: ICD-10-CM

## 2021-08-10 DIAGNOSIS — F32.2 MAJOR DEPRESSIVE DISORDER, SEVERE (HCC): ICD-10-CM

## 2021-08-10 DIAGNOSIS — G89.29 OTHER CHRONIC PAIN: Primary | ICD-10-CM

## 2021-08-10 DIAGNOSIS — K59.03 CONSTIPATION DUE TO OPIOID THERAPY: ICD-10-CM

## 2021-08-10 PROCEDURE — 99490 CHRNC CARE MGMT STAFF 1ST 20: CPT | Performed by: NURSE PRACTITIONER

## 2021-08-10 RX ORDER — FUROSEMIDE 20 MG/1
20 TABLET ORAL DAILY
Qty: 30 TABLET | Refills: 0 | Status: SHIPPED | OUTPATIENT
Start: 2021-08-10 | End: 2021-09-17

## 2021-08-10 NOTE — OUTREACH NOTE
Ambulatory Case Management Note    CCM Interim Update    Spoke with patient, she is doing well but in quarantine for COVID exposure until Sunday per PCP instructions. Needs refill on her diuretic and I will send in basket to PCP for this. Has had some stools with her new medications for her constipation, is not keeping a log of her blood pressures. No other medication changes at this time per review with her. Depression improved but is lonely in quarantine. Care Plan updated.        There are no recently modified care plans to display for this patient.      Sahara Zarate RN  Ambulatory Case Management    8/10/2021, 16:48 EDT

## 2021-08-26 ENCOUNTER — TELEPHONE (OUTPATIENT)
Dept: FAMILY MEDICINE CLINIC | Facility: CLINIC | Age: 66
End: 2021-08-26

## 2021-08-26 ENCOUNTER — OFFICE VISIT (OUTPATIENT)
Dept: FAMILY MEDICINE CLINIC | Facility: CLINIC | Age: 66
End: 2021-08-26

## 2021-08-26 VITALS
OXYGEN SATURATION: 94 % | HEART RATE: 79 BPM | RESPIRATION RATE: 18 BRPM | DIASTOLIC BLOOD PRESSURE: 74 MMHG | SYSTOLIC BLOOD PRESSURE: 112 MMHG | TEMPERATURE: 97.6 F | BODY MASS INDEX: 25.35 KG/M2 | WEIGHT: 157 LBS

## 2021-08-26 DIAGNOSIS — R53.82 CHRONIC FATIGUE: ICD-10-CM

## 2021-08-26 DIAGNOSIS — F33.1 MAJOR DEPRESSIVE DISORDER, RECURRENT, MODERATE (HCC): Primary | ICD-10-CM

## 2021-08-26 DIAGNOSIS — E78.2 MIXED HYPERLIPIDEMIA: ICD-10-CM

## 2021-08-26 DIAGNOSIS — F51.01 PRIMARY INSOMNIA: ICD-10-CM

## 2021-08-26 PROCEDURE — 99214 OFFICE O/P EST MOD 30 MIN: CPT | Performed by: NURSE PRACTITIONER

## 2021-08-26 RX ORDER — TRAZODONE HYDROCHLORIDE 150 MG/1
150 TABLET ORAL NIGHTLY
Qty: 90 TABLET | Refills: 0 | Status: SHIPPED | OUTPATIENT
Start: 2021-08-26 | End: 2021-09-20 | Stop reason: SDUPTHER

## 2021-08-26 RX ORDER — ROSUVASTATIN CALCIUM 10 MG/1
10 TABLET, COATED ORAL EVERY 24 HOURS
Qty: 90 TABLET | Refills: 1 | Status: SHIPPED | OUTPATIENT
Start: 2021-08-26

## 2021-08-26 RX ORDER — ARIPIPRAZOLE 2 MG/1
2 TABLET ORAL DAILY
Qty: 30 TABLET | Refills: 1 | Status: SHIPPED | OUTPATIENT
Start: 2021-08-26 | End: 2021-09-24 | Stop reason: SDUPTHER

## 2021-08-26 NOTE — PROGRESS NOTES
Chief Complaint  Depression (LEXAPRO)    Subjective       History of Present Illness  Annetta Malagon presents to [facility]      No recent falls    Flaky skin, needs refill on amlactin    Depression, she slept all week, she wants to try an add on    Insmonia, wants a refill on the trazodone    HLP- needs refills on crestor    Past Medical History:   • Anxiety   • Arthritis   • Bipolar disorder (CMS/[facility])   • Bladder disorder   • Broken bones   • CHF (congestive heart failure) (CMS/[facility])   • Chronic pain   • Claustrophobia   • DM2 (diabetes mellitus, type 2) (CMS/[facility])   • GERD (gastroesophageal reflux disease)   • Hemorrhoids   • HLD (hyperlipidemia)   • HTN (hypertension)   • Hypothyroid   • Insomnia   • Insomnia, unspecified   • Kidney calculus   • Kidney disease   • Limb swelling   • Lung disease   • Major depressive disorder   • MDD (major depressive disorder)   • Osteoarthritis   • Osteopenia   • Reflux disease   • Thyroid disorder   • Thyroid trouble   • Type 2 diabetes mellitus (CMS/[facility])   • Varicose veins of both lower extremities   • Vitamin D deficiency       Allergies  Duloxetine hcl    Past Surgical History:   • ANKLE SURGERY    Repair    • ANKLE SURGERY   • APPENDECTOMY   • COLONOSCOPY   • COLONOSCOPY   • GALLBLADDER SURGERY   • HEMORRHOIDECTOMY   • HEMORROIDECTOMY   • HIP SURGERY    broke   • HIP SURGERY    Broke   • HYSTERECTOMY   • KIDNEY SURGERY   • OTHER SURGICAL HISTORY    Surgical clips   • OTHER SURGICAL HISTORY    Surgical Clips   • TONSILLECTOMY   • TUBAL ABDOMINAL LIGATION       Social History     Tobacco Use   • Smoking status: Former Smoker     Packs/day: 2.00     Years: 48.00     Pack years: 96.00     Types: Cigarettes   • Smokeless tobacco: Never Used   • Tobacco comment: no longer smokes   Vaping Use   • Vaping Use: Never used   Substance Use Topics   • Alcohol use: Yes     Comment: occasionally   • Drug use: Never       Family History   Problem Relation  Age of Onset   • Liver cancer Mother    • Lung cancer Mother    • Ovarian cancer Mother    • Heart disease Mother    • Cancer Mother         unspecified/Unspecified   • Bone cancer Mother         Passed away at age 83 3/3/15   • Brain cancer Mother         Benign   • Alzheimer's disease Father         Passed away at age 83 1/2015   • Arthritis Father    • Stroke Father    • Osteoporosis Sister    • Arthritis Sister    • Heart disease Sister    • Colon cancer Maternal Cousin         2 cousins maternal side age 70 & 52 still living ages 70 and 65   • Diabetes Son         Unspecified   • Heart disease Sister    • Osteoporosis Sister    • Cancer Brother         Unspecified        Health Maintenance Due   Topic Date Due   • URINE MICROALBUMIN  Never done   • TDAP/TD VACCINES (1 - Tdap) Never done   • ZOSTER VACCINE (1 of 2) Never done   • HEPATITIS C SCREENING  Never done          Current Outpatient Medications:   •  acetaminophen (TYLENOL) 325 MG tablet, acetaminophen 325 mg tablet  TAKE TWO TABLETS BY MOUTH EVERY 4 HOURS AS NEEDED FOR mild PAIN, DO not exceed 3 grams/day, Disp: , Rfl:   •  ammonium lactate (AMLACTIN) 12 % cream, ammonium lactate 12 % topical cream  apply to the affected area EVERY DAY, Disp: , Rfl:   •  aspirin 81 MG chewable tablet, aspirin 81 mg oral tablet,chewable chew 1 tablet (81 mg) by oral route once daily   Suspended, Disp: , Rfl:   •  Cholecalciferol (Vitamin D3) 1.25 MG (05404 UT) capsule, TAKE ONE CAPSULE BY MOUTH every sunday, Disp: , Rfl:   •  cyclobenzaprine (FLEXERIL) 10 MG tablet, Take 1 tablet by mouth 3 (Three) Times a Day As Needed for Muscle Spasms., Disp: 90 tablet, Rfl: 1  •  Diclofenac Sodium (Voltaren) 1 % gel gel, Apply 4 g topically to the appropriate area as directed 4 (Four) Times a Day As Needed (PAIN)., Disp: 50 g, Rfl: 1  •  escitalopram (LEXAPRO) 10 MG tablet, Take 1 tablet by mouth Daily., Disp: 30 tablet, Rfl: 0  •  fluticasone (FLONASE) 50 MCG/ACT nasal spray,  instill 2 sprays in each nostril every day AS NEEDED, Disp: , Rfl:   •  furosemide (LASIX) 20 MG tablet, Take 1 tablet by mouth Daily., Disp: 30 tablet, Rfl: 0  •  gabapentin (NEURONTIN) 800 MG tablet, Every 8 (Eight) Hours., Disp: , Rfl:   •  HYDROcodone-acetaminophen (NORCO) 7.5-325 MG per tablet, hydrocodone-acetaminophen 7.5-325 mg oral tablet take 1 tablet by oral route every 6 hours as needed for pain   Suspended, Disp: , Rfl:   •  meloxicam (MOBIC) 15 MG tablet, meloxicam 15 mg tablet, Disp: , Rfl:   •  Methylnaltrexone Bromide (Relistor) 150 MG tablet, Take 450 mg by mouth Daily., Disp: 90 tablet, Rfl: 1  •  nitroglycerin (Nitrostat) 0.4 MG SL tablet, Nitrostat 0.4 mg sublingual tablet, sublingual place 1 tablet (0.4 mg) by buccal route at the first sign of an attack; no more than 3 tabs are recommended within a 15 minute period.   Suspended, Disp: , Rfl:   •  oxyCODONE (ROXICODONE) 10 MG tablet, oxycodone 10 mg tablet, Disp: , Rfl:   •  oxyCODONE-acetaminophen (PERCOCET)  MG per tablet, oxycodone-acetaminophen 10 mg-325 mg tablet  TAKE ONE TABLET BY MOUTH EVERY 6 HOURS AS NEEDED, Disp: , Rfl:   •  phentermine (ADIPEX-P) 37.5 MG tablet, Take 37.5 mg by mouth Daily., Disp: , Rfl:   •  rosuvastatin (CRESTOR) 10 MG tablet, Take 1 tablet by mouth Daily., Disp: 90 tablet, Rfl: 1  •  topiramate (TOPAMAX) 100 MG tablet, topiramate 100 mg tablet  TAKE ONE TABLET EVERY DAY, Disp: , Rfl:   •  traZODone (DESYREL) 150 MG tablet, Take 1 tablet by mouth Every Night., Disp: 90 tablet, Rfl: 0  •  triamcinolone (KENALOG) 0.1 % cream, triamcinolone acetonide 0.1 % topical cream, Disp: , Rfl:   •  ARIPiprazole (Abilify) 2 MG tablet, Take 1 tablet by mouth Daily., Disp: 30 tablet, Rfl: 1    Immunization History   Administered Date(s) Administered   • COVID-19 (AUTUMN) 05/03/2021   • Influenza, Unspecified 09/17/2018, 09/17/2018   • Pneumococcal Polysaccharide (PPSV23) 10/20/2014, 10/20/2014, 11/15/2019, 11/15/2019        Objective     Vitals:    08/26/21 1316   BP: 112/74   Pulse: 79   Resp: 18   Temp: 97.6 °F (36.4 °C)   SpO2: 94%     Body mass index is 25.35 kg/m².     Physical Exam  Vitals reviewed.   Constitutional:       Appearance: Normal appearance. She is well-developed.   HENT:      Head: Normocephalic and atraumatic.      Right Ear: External ear normal.      Left Ear: External ear normal.      Mouth/Throat:      Pharynx: No oropharyngeal exudate.   Eyes:      Conjunctiva/sclera: Conjunctivae normal.      Pupils: Pupils are equal, round, and reactive to light.   Neck:      Thyroid: No thyroid mass, thyromegaly or thyroid tenderness.      Vascular: No carotid bruit or JVD.      Trachea: Trachea normal.   Cardiovascular:      Rate and Rhythm: Normal rate and regular rhythm.      Heart sounds: Normal heart sounds. No murmur heard.   No friction rub. No gallop.    Pulmonary:      Effort: Pulmonary effort is normal.      Breath sounds: Normal breath sounds. No wheezing or rhonchi.   Abdominal:      General: Bowel sounds are normal. There is no distension.      Palpations: Abdomen is soft.      Tenderness: There is no abdominal tenderness.   Musculoskeletal:      Right lower leg: No edema.      Left lower leg: No edema.   Lymphadenopathy:      Cervical: No cervical adenopathy.   Skin:     General: Skin is warm and dry.   Neurological:      Mental Status: She is alert and oriented to person, place, and time.      Cranial Nerves: No cranial nerve deficit.      Motor: No weakness.   Psychiatric:         Mood and Affect: Mood and affect normal.         Behavior: Behavior normal.         Thought Content: Thought content normal.         Judgment: Judgment normal.             Result Review :    The following data was reviewed by: ELADIO Grove on 08/26/2021:    Common labs    Common Labsle 3/23/21 3/23/21 3/24/21 3/24/21 4/30/21    0419 0419 0431 0431    Glucose  104 (A)  125 (A) 104 (A)   BUN  14  12 17   Creatinine   0.72  0.65 1.02 (A)   Sodium  134 (A)  137 142   Potassium  3.9  3.3 (A) 3.8   Chloride  101  103 101   Calcium  8.0 (A)  8.4 (A) 9.1   Albumin     3.9   Total Bilirubin     0.28   Alkaline Phosphatase     104   AST (SGOT)     19   ALT (SGPT)     11   WBC   6.06  6.80   Hemoglobin 10.3 (A)  9.7 (A)  12.8   Hematocrit 32.9 (A)  30.3 (A)  41.3   Platelets   138  286   Total Cholesterol     175   Triglycerides     118   HDL Cholesterol     71 (A)   LDL Cholesterol      80   (A) Abnormal value       Comments are available for some flowsheets but are not being displayed.                           Assessment and Plan     Diagnoses and all orders for this visit:    1. Major depressive disorder, recurrent, moderate (CMS/HCC) (Primary)  Comments:  continue the lexapro and add abilify currently not to target but will continue to monitor closely  Orders:  -     ARIPiprazole (Abilify) 2 MG tablet; Take 1 tablet by mouth Daily.  Dispense: 30 tablet; Refill: 1    2. Chronic fatigue  Comments:  hopefully once the depression is better, she will have improvement with the fatigue.     3. Mixed hyperlipidemia  Comments:  Stable with Crestor  Orders:  -     rosuvastatin (CRESTOR) 10 MG tablet; Take 1 tablet by mouth Daily.  Dispense: 90 tablet; Refill: 1    4. Primary insomnia  Comments:  Refill trazodone, this is helped her symptoms  Orders:  -     traZODone (DESYREL) 150 MG tablet; Take 1 tablet by mouth Every Night.  Dispense: 90 tablet; Refill: 0        I spent 35 minutes caring for Annetta on this date of service. This time includes time spent by me in the following activities:preparing for the visit, reviewing tests, obtaining and/or reviewing a separately obtained history, performing a medically appropriate examination and/or evaluation , ordering medications, tests, or procedures, documenting information in the medical record and care coordination    Follow Up     Return in about 4 weeks (around 9/23/2021).    Patient was given  instructions and counseling regarding her condition or for health maintenance advice. Please see specific information pulled into the AVS if appropriate.     Annetta Malagon  reports that she has quit smoking. Her smoking use included cigarettes. She has a 96.00 pack-year smoking history. She has never used smokeless tobacco.           ELADIO Grove

## 2021-08-31 ENCOUNTER — PATIENT OUTREACH (OUTPATIENT)
Dept: CASE MANAGEMENT | Facility: OTHER | Age: 66
End: 2021-08-31

## 2021-08-31 DIAGNOSIS — G89.29 OTHER CHRONIC PAIN: Primary | ICD-10-CM

## 2021-08-31 DIAGNOSIS — I10 HYPERTENSION, UNSPECIFIED TYPE: ICD-10-CM

## 2021-08-31 DIAGNOSIS — F32.2 MAJOR DEPRESSIVE DISORDER, SEVERE (HCC): ICD-10-CM

## 2021-08-31 DIAGNOSIS — K59.03 CONSTIPATION DUE TO OPIOID THERAPY: ICD-10-CM

## 2021-08-31 DIAGNOSIS — T40.2X5A CONSTIPATION DUE TO OPIOID THERAPY: ICD-10-CM

## 2021-08-31 NOTE — OUTREACH NOTE
[facility] End of Month Documentation    This Chronic Medical Management Care Plan for Annetta Malagon, 65 y.o. female, has been monitored and managed;reviewed;revised;complete and a new plan of care implemented for the month of August.  A cumulative time of 26  minutes was spent on this patient record this month, including phone call with patient;face to face with provider;chart review;electronic communication with primary care provider, call with patient, spoke with PCP at office and sent message for refill request per telephone outreach.    Regarding the patient's problems: has Right hip pain; Aftercare following surgery of the musculoskeletal system; Age related osteoporosis; Anxiety; Arthritis of right hip; Bilateral carpal tunnel syndrome; Bipolar disorder (CMS/[facility]); Bladder disorder; Chronic migraine; Broken bones; Congestive heart failure (CMS/[facility]); Degeneration of lumbosacral intervertebral disc; Diabetes mellitus, type II (CMS/[facility]); Drug-induced constipation; Chronic pain; Gastroesophageal reflux disease without esophagitis; Hypertension; Hemorrhoids; Hypothyroid; Lumbar spondylosis; Lumbosacral spondylosis without myelopathy; Major depressive disorder; Mixed hyperlipidemia; Osteoarthritis; Neuropathy; Osteoarthritis of hip; Primary insomnia; Varicose veins of both lower extremities; Vitamin D deficiency; and History of hip replacement on their problem list., the following items were addressed: medical records;medications, Offered f/u with PCP due to falls, she refuses at this time, offered referral for therapy and she reports she is doing her home exercises and does not need therapy at home or outpatient and any changes can be found within the plan section of the note.  A detailed listing of time spent for chronic care management is tracked within each outreach encounter.  Current medications include:  has a current medication list which includes the following prescription(s): acetaminophen, ammonium lactate,  aripiprazole, aspirin, vitamin d3, cyclobenzaprine, diclofenac sodium, escitalopram, fluticasone, furosemide, gabapentin, hydrocodone-acetaminophen, meloxicam, relistor, nitroglycerin, oxycodone, oxycodone-acetaminophen, phentermine, rosuvastatin, topiramate, trazodone, and triamcinolone. and the patient is reported to be patient is compliant with medication protocol, reports taking her constipation medication and having some stools with it, constipated stools but is going a few times,  Medications are reported to be effective, Message to PCP and will call [employer].   The patient was monitored remotely for blood pressure;activity level;mood & behavior, no blood pressure log to report, not checking it at home, mood and behavior improved but now in quarantine for exposure to COVID.    The patient's physical needs include:  needs met.     The patient's mental support needs include:  continued support;coordination of community providers, needs refill on medication    The patient's cognitive support needs include:  needs met    The patient's psychosocial support needs include:  continued support, doing well even with quarantine at this time, has neighbor calling and checking on her daily    The patient's functional needs include: needs met    The patient's environmental needs include:  none    Care Plan overall comments:  No data recorded    Refer to previous outreach notes for more information on the areas listed above.    Monthly Billing Diagnoses  No diagnosis found.    Medications   · Medications have been reconciled    Care Plan progress this month:      Recently Modified Care Plans Updates made since 7/31/2021 12:00 AM     Hypertension         Problem Priority Last Modified     BLOOD PRESSURE MONITORING --  6/22/2021 10:34 AM by Sahara Zarate RN              Goal Recent Progress Last Modified     Establish Regular Follow-Ups with PCP --  8/10/2021  4:52 PM by Sahara Zarate RN              Task Due Date Last  Modified     Discuss schedule for PCP visits with patient --  8/10/2021  4:52 PM by Sahara Zarate RN     Discussed need for follow up today at CCM call due to falls at home, patient does not want to be seen at this time.   Has had visit with PCP and has a follow up scheduled.              Problem Priority Last Modified     PATIENT IS HYPERTENSIVE --  6/22/2021 10:34 AM by Sahara Zarate RN              Goal Recent Progress Last Modified     Remain at or Below Target Blood Pressure per /90 or less On track  7/20/2021 11:42 AM by Sahara Zarate RN     Discussed with PCP and her range is 140/90 or less  Per discussion with patient she is around 120/80 on average but was not able to give me a full log of readings.              Task Due Date Last Modified     Establish a target blood pressure with the patient in conjunction with PCP --  8/10/2021  4:53 PM by Sahara Zarate RN        Discuss steps to manage BP with patient --  8/10/2021  4:53 PM by Sahara Zarate RN     Patient compliant with medications and low sodium diet, she is needing refill of diuretics today. Is not keeping blood pressure log at home.                     Instructions   · Patient was provided an electronic copy of care plan  · CCM services were explained and offered and patient has accepted these services.  · Patient has given their written consent to receive CCM services and understands that this includes the authorization of electronic communication of medical information with the other treating providers.  · Patient understands that they may stop CCM services at any time and these changes will be effective at the end of the calendar month and will effectively revocate the agreement of CCM services.  · Patient understands that only one practitioner can furnish and be paid for CCM services during one calendar month.  Patient also understands that there may be co-payment or deductible fees in association with CCM services.  · Patient  will continue with at least monthly follow-up calls with the Nurse Navigator.    Sahara Zarate RN  Ambulatory Case Management    8/31/2021, 14:13 EDT

## 2021-09-02 ENCOUNTER — TELEMEDICINE (OUTPATIENT)
Dept: FAMILY MEDICINE CLINIC | Facility: CLINIC | Age: 66
End: 2021-09-02

## 2021-09-02 DIAGNOSIS — R42 DIZZINESS OF UNKNOWN CAUSE: Primary | ICD-10-CM

## 2021-09-02 DIAGNOSIS — F32.A ANXIETY AND DEPRESSION: ICD-10-CM

## 2021-09-02 DIAGNOSIS — F41.9 ANXIETY AND DEPRESSION: ICD-10-CM

## 2021-09-02 PROCEDURE — 99213 OFFICE O/P EST LOW 20 MIN: CPT | Performed by: NURSE PRACTITIONER

## 2021-09-02 NOTE — PROGRESS NOTES
Chief Complaint  Dizziness    Subjective       History of Present Illness  Annetta Malagon presents to [facility]    She has took abilify yesterday with her pain medicine and felt dizzy, light headed all day. She was dizzy to the point that she collapsed when her neighbor was at her house. It has helped with the anxiety and she is not staying in bed all day. She has been taking it for about a week and yesterday was the first day that she had problems. She is not sure it was the abilify.     Past Medical History:   • Anxiety   • Arthritis   • Bipolar disorder (CMS/HCC)   • Bladder disorder   • Broken bones   • CHF (congestive heart failure) (CMS/[facility])   • Chronic pain   • Claustrophobia   • DM2 (diabetes mellitus, type 2) (CMS/[facility])   • GERD (gastroesophageal reflux disease)   • Hemorrhoids   • HLD (hyperlipidemia)   • HTN (hypertension)   • Hypothyroid   • Insomnia   • Insomnia, unspecified   • Kidney calculus   • Kidney disease   • Limb swelling   • Lung disease   • Major depressive disorder   • MDD (major depressive disorder)   • Osteoarthritis   • Osteopenia   • Reflux disease   • Thyroid disorder   • Thyroid trouble   • Type 2 diabetes mellitus (CMS/HCC)   • Varicose veins of both lower extremities   • Vitamin D deficiency       Allergies  Duloxetine hcl    Past Surgical History:   • ANKLE SURGERY    Repair    • ANKLE SURGERY   • APPENDECTOMY   • COLONOSCOPY   • COLONOSCOPY   • GALLBLADDER SURGERY   • HEMORRHOIDECTOMY   • HEMORROIDECTOMY   • HIP SURGERY    broke   • HIP SURGERY    Broke   • HYSTERECTOMY   • KIDNEY SURGERY   • OTHER SURGICAL HISTORY    Surgical clips   • OTHER SURGICAL HISTORY    Surgical Clips   • TONSILLECTOMY   • TUBAL ABDOMINAL LIGATION       Social History     Tobacco Use   • Smoking status: Former Smoker     Packs/day: 2.00     Years: 48.00     Pack years: 96.00     Types: Cigarettes     Quit date:      Years since quittin.6   • Smokeless tobacco:  Never Used   • Tobacco comment: no longer smokes   Vaping Use   • Vaping Use: Never used   Substance Use Topics   • Alcohol use: Yes     Comment: occasionally   • Drug use: Never       Family History   Problem Relation Age of Onset   • Liver cancer Mother    • Lung cancer Mother    • Ovarian cancer Mother    • Heart disease Mother    • Cancer Mother         unspecified/Unspecified   • Bone cancer Mother         Passed away at age 83 3/3/15   • Brain cancer Mother         Benign   • Alzheimer's disease Father         Passed away at age 83 1/2015   • Arthritis Father    • Stroke Father    • Osteoporosis Sister    • Arthritis Sister    • Heart disease Sister    • Colon cancer Maternal Cousin         2 cousins maternal side age 70 & 52 still living ages 70 and 65   • Diabetes Son         Unspecified   • Heart disease Sister    • Osteoporosis Sister    • Cancer Brother         Unspecified        Health Maintenance Due   Topic Date Due   • URINE MICROALBUMIN  Never done   • TDAP/TD VACCINES (1 - Tdap) Never done   • ZOSTER VACCINE (1 of 2) Never done   • LUNG CANCER SCREENING  Never done   • HEPATITIS C SCREENING  Never done          Current Outpatient Medications:   •  acetaminophen (TYLENOL) 325 MG tablet, acetaminophen 325 mg tablet  TAKE TWO TABLETS BY MOUTH EVERY 4 HOURS AS NEEDED FOR mild PAIN, DO not exceed 3 grams/day, Disp: , Rfl:   •  ammonium lactate (AMLACTIN) 12 % cream, ammonium lactate 12 % topical cream  apply to the affected area EVERY DAY, Disp: , Rfl:   •  ARIPiprazole (Abilify) 2 MG tablet, Take 1 tablet by mouth Daily., Disp: 30 tablet, Rfl: 1  •  aspirin 81 MG chewable tablet, aspirin 81 mg oral tablet,chewable chew 1 tablet (81 mg) by oral route once daily   Suspended, Disp: , Rfl:   •  Cholecalciferol (Vitamin D3) 1.25 MG (49440 UT) capsule, TAKE ONE CAPSULE BY MOUTH every sunday, Disp: , Rfl:   •  cyclobenzaprine (FLEXERIL) 10 MG tablet, Take 1 tablet by mouth 3 (Three) Times a Day As Needed for  Muscle Spasms., Disp: 90 tablet, Rfl: 1  •  Diclofenac Sodium (Voltaren) 1 % gel gel, Apply 4 g topically to the appropriate area as directed 4 (Four) Times a Day As Needed (PAIN)., Disp: 50 g, Rfl: 1  •  escitalopram (LEXAPRO) 10 MG tablet, Take 1 tablet by mouth Daily., Disp: 30 tablet, Rfl: 0  •  fluticasone (FLONASE) 50 MCG/ACT nasal spray, instill 2 sprays in each nostril every day AS NEEDED, Disp: , Rfl:   •  furosemide (LASIX) 20 MG tablet, Take 1 tablet by mouth Daily., Disp: 30 tablet, Rfl: 0  •  gabapentin (NEURONTIN) 800 MG tablet, Every 8 (Eight) Hours., Disp: , Rfl:   •  HYDROcodone-acetaminophen (NORCO) 7.5-325 MG per tablet, hydrocodone-acetaminophen 7.5-325 mg oral tablet take 1 tablet by oral route every 6 hours as needed for pain   Suspended, Disp: , Rfl:   •  meloxicam (MOBIC) 15 MG tablet, meloxicam 15 mg tablet, Disp: , Rfl:   •  Methylnaltrexone Bromide (Relistor) 150 MG tablet, Take 450 mg by mouth Daily., Disp: 90 tablet, Rfl: 1  •  nitroglycerin (Nitrostat) 0.4 MG SL tablet, Nitrostat 0.4 mg sublingual tablet, sublingual place 1 tablet (0.4 mg) by buccal route at the first sign of an attack; no more than 3 tabs are recommended within a 15 minute period.   Suspended, Disp: , Rfl:   •  oxyCODONE (ROXICODONE) 10 MG tablet, oxycodone 10 mg tablet, Disp: , Rfl:   •  oxyCODONE-acetaminophen (PERCOCET)  MG per tablet, oxycodone-acetaminophen 10 mg-325 mg tablet  TAKE ONE TABLET BY MOUTH EVERY 6 HOURS AS NEEDED, Disp: , Rfl:   •  phentermine (ADIPEX-P) 37.5 MG tablet, Take 37.5 mg by mouth Daily., Disp: , Rfl:   •  rosuvastatin (CRESTOR) 10 MG tablet, Take 1 tablet by mouth Daily., Disp: 90 tablet, Rfl: 1  •  topiramate (TOPAMAX) 100 MG tablet, topiramate 100 mg tablet  TAKE ONE TABLET EVERY DAY, Disp: , Rfl:   •  traZODone (DESYREL) 150 MG tablet, Take 1 tablet by mouth Every Night., Disp: 90 tablet, Rfl: 0  •  triamcinolone (KENALOG) 0.1 % cream, triamcinolone acetonide 0.1 % topical cream,  Disp: , Rfl:     Immunization History   Administered Date(s) Administered   • COVID-19 (AUTUMN) 05/03/2021   • Influenza, Unspecified 09/17/2018, 09/17/2018   • Pneumococcal Polysaccharide (PPSV23) 10/20/2014, 10/20/2014, 11/15/2019, 11/15/2019       Objective     There were no vitals filed for this visit.  There is no height or weight on file to calculate BMI.     Physical Exam    Well developed well nourished in no distress    Breathing comfortably    Smiling, oriented x 3             Assessment and Plan     Diagnoses and all orders for this visit:    1. Dizziness of unknown cause (Primary)  Comments:  Possibly from taking the Abilify with the pain medication.  I have given her the option to hold the Abilify completely or tried taking at bedtime.    2. Anxiety and depression  Comments:  Improved with addition of Abilify but will wait to see what happens when she holds the Abilify and her potential side effects.            Follow Up     She has a follow-up scheduled with me in 3 weeks we will reevaluate at that time  Patient was given instructions and counseling regarding her condition or for health maintenance advice. Please see specific information pulled into the AVS if appropriate.              ELADIO Grove

## 2021-09-07 ENCOUNTER — PATIENT OUTREACH (OUTPATIENT)
Dept: CASE MANAGEMENT | Facility: OTHER | Age: 66
End: 2021-09-07

## 2021-09-07 DIAGNOSIS — I10 HYPERTENSION, UNSPECIFIED TYPE: ICD-10-CM

## 2021-09-07 DIAGNOSIS — T40.2X5A CONSTIPATION DUE TO OPIOID THERAPY: ICD-10-CM

## 2021-09-07 DIAGNOSIS — F32.2 MAJOR DEPRESSIVE DISORDER, SEVERE (HCC): ICD-10-CM

## 2021-09-07 DIAGNOSIS — G89.29 OTHER CHRONIC PAIN: Primary | ICD-10-CM

## 2021-09-07 DIAGNOSIS — K59.03 CONSTIPATION DUE TO OPIOID THERAPY: ICD-10-CM

## 2021-09-07 PROCEDURE — 99439 CHRNC CARE MGMT STAF EA ADDL: CPT | Performed by: NURSE PRACTITIONER

## 2021-09-07 PROCEDURE — 99490 CHRNC CARE MGMT STAFF 1ST 20: CPT | Performed by: NURSE PRACTITIONER

## 2021-09-07 NOTE — OUTREACH NOTE
"Ambulatory Case Management Note    CCM Interim Update    Per chart review she had tele-health with PCP for dizziness and reported she is to either change Abilify to night time or hold it and see how dizzy spells go. She has 3 week follow up for 9/24/21 and she had reported the Abilify was working for depression as she did not feel like she wanted to stay in bed all the time. She has upcoming appointment with ortho and PCP this month.     Called and she is doing better, reports more energy and up cleaning and dancing with changing Abilify to nighttime. No dizziness, still not having regular bowel movements. Will do samples of Linzess if can try them and if works we can see if insurance will cover for her. She reports never used but I thought she had used samples an it worked. Reports she is down to 149.9 pounds and has come down from 203 pounds. She has lost almost 54 pounds. She reports \"I want another certificate\" completed another Certificate of Achievement for weight loss for her, will have PCP cosign later this week as she is off for now.         There are no recently modified care plans to display for this patient.      Sahara Zarate RN  Ambulatory Case Management    9/7/2021, 13:45 EDT    "

## 2021-09-10 ENCOUNTER — PATIENT OUTREACH (OUTPATIENT)
Dept: CASE MANAGEMENT | Facility: OTHER | Age: 66
End: 2021-09-10

## 2021-09-10 DIAGNOSIS — T40.2X5A CONSTIPATION DUE TO OPIOID THERAPY: Primary | ICD-10-CM

## 2021-09-10 DIAGNOSIS — K59.03 CONSTIPATION DUE TO OPIOID THERAPY: Primary | ICD-10-CM

## 2021-09-10 DIAGNOSIS — F31.77 BIPOLAR DISORDER, IN PARTIAL REMISSION, MOST RECENT EPISODE MIXED (HCC): ICD-10-CM

## 2021-09-10 NOTE — OUTREACH NOTE
Ambulatory Case Management Note    CCM Interim Update    Spoke with PCP at office, she signed the [facility] Certificate of Achievement and I scanned to chart and mailing to her home. I took the Linzess samples to the office from [place] office of 2 boxes of 4 tablets of 145 mcg Lot # [id number], EXP: 11/2021 and discussed with PCP, she is ok for patient to take samples but reports her physical exam of abdomen was normal at her last visit with soft and non tender abdomen and she had told patient if the newest medication for constipation had not worked, Relistor 450 mg po daily, that she wanted her to see GI. I will discuss with patient. PCP also felt she had tried Linzess and it had not worked in the past.     Patient reports she is taking 3 tablets of the Relistor and not working, she is in agreement to try the Linzess and see how it goes. She is aware of samples at office and will let Sonia know if it works or not.     Message to clinical pool to update her medication list with samples.       There are no recently modified care plans to display for this patient.      Sahara Zarate RN  Ambulatory Case Management    9/10/2021, 09:39 EDT

## 2021-09-13 ENCOUNTER — PATIENT OUTREACH (OUTPATIENT)
Dept: CASE MANAGEMENT | Facility: OTHER | Age: 66
End: 2021-09-13

## 2021-09-13 ENCOUNTER — TELEPHONE (OUTPATIENT)
Dept: CASE MANAGEMENT | Facility: OTHER | Age: 66
End: 2021-09-13

## 2021-09-13 DIAGNOSIS — T40.2X5A CONSTIPATION DUE TO OPIOID THERAPY: Primary | ICD-10-CM

## 2021-09-13 DIAGNOSIS — F31.77 BIPOLAR DISORDER, IN PARTIAL REMISSION, MOST RECENT EPISODE MIXED (HCC): ICD-10-CM

## 2021-09-13 DIAGNOSIS — K59.03 CONSTIPATION DUE TO OPIOID THERAPY: Primary | ICD-10-CM

## 2021-09-13 NOTE — TELEPHONE ENCOUNTER
Patient had left voicemail on my work phone to return her call. Noted she reports one of the medicines PCP started her on is making her tongue swell. Reports dry mouth for about 2 weeks now and tongue swelling as of yesterday and cannot hardly swallow. Feels like she may have thrush. She reports unable to get into office and the other place keeps answering. Explained HUB and calls go to all center. She did not want to talk to them so she called me. She denies shortness of breath or anaphylaxis symptoms of rash, difficulty breathing, N/V, or elevated heart rate. She thinks it is either one of the two new medicines she started 2 weeks ago or the grapes or strawberries she has been eating. Denies any white patches on her tongue.  I told her I will inform PCP. She denies starting the Linzess samples for constipation as she forgot to pick them up.

## 2021-09-13 NOTE — OUTREACH NOTE
Ambulatory Case Management Note    CCM Interim Update    Patient had left voicemail on my work phone to return her call. Noted she reports one of the medicines PCP started her on is making her tongue swell. Reports dry mouth for about 2 weeks now and tongue swelling as of yesterday and cannot hardly swallow. Feels like she may have thrush. She reports unable to get into office and the other place keeps answering. Explained HUB and calls go to all center. She did not want to talk to them so she called me. She denies shortness of breath or anaphylaxis symptoms of rash, difficulty breathing, N/V, or elevated heart rate. She thinks it is either one of the two new medicines she started 2 weeks ago or the grapes or strawberries she has been eating. Denies any white patches on her tongue.  I told her I will inform PCP. She denies starting the Linzess samples for constipation as she forgot to pick them up.    Message from PCP to try Benadryl for allergic reaction and see how it goes. She was going to have office call to et her appointment with ELADIO Pringle who is in office this week, and inform to go to ER if difficulty breathing, I informed patient and she will do benadryl and refuses to see Vy or go to ER as she feels it is not needed. She reports as well that she is doing [facility] mail for her scripts except her pain medication and they have sent paperwork for Sonia to sign and she needs to send that back to them along with her prescriptions faxed to them. I will update PCP , did not see anything in her chart at this time from [facility].       There are no recently modified care plans to display for this patient.      Sahara Zarate RN  Ambulatory Case Management    9/13/2021, 12:06 EDT

## 2021-09-14 NOTE — PROGRESS NOTES
CONTACTED PATIENT AND SHE SAID THAT SHE IS BETTER AND IS GOING TO LIVE - SUGGESTED AN APPOINTMENT WITH [facility] AND SHE DECLINED

## 2021-09-16 ENCOUNTER — TELEPHONE (OUTPATIENT)
Dept: FAMILY MEDICINE CLINIC | Facility: CLINIC | Age: 66
End: 2021-09-16

## 2021-09-17 DIAGNOSIS — F51.01 PRIMARY INSOMNIA: ICD-10-CM

## 2021-09-17 RX ORDER — FUROSEMIDE 20 MG/1
TABLET ORAL
Qty: 90 TABLET | Refills: 0 | Status: SHIPPED | OUTPATIENT
Start: 2021-09-17 | End: 2021-09-20 | Stop reason: SDUPTHER

## 2021-09-17 NOTE — TELEPHONE ENCOUNTER
PATIENT USES [employer] MAIL ORDER - THIS WILL BE HER FIRST TIME IF YOU NEED ADDITIONAL INFO PLEASE CALL HER BACK

## 2021-09-20 ENCOUNTER — TELEPHONE (OUTPATIENT)
Dept: FAMILY MEDICINE CLINIC | Facility: CLINIC | Age: 66
End: 2021-09-20

## 2021-09-20 DIAGNOSIS — F51.01 PRIMARY INSOMNIA: ICD-10-CM

## 2021-09-20 RX ORDER — FUROSEMIDE 20 MG/1
20 TABLET ORAL DAILY
Qty: 90 TABLET | Refills: 0 | Status: SHIPPED | OUTPATIENT
Start: 2021-09-20

## 2021-09-20 RX ORDER — TRAZODONE HYDROCHLORIDE 150 MG/1
150 TABLET ORAL NIGHTLY
Qty: 90 TABLET | Refills: 0 | Status: SHIPPED | OUTPATIENT
Start: 2021-09-20 | End: 2022-01-01

## 2021-09-20 RX ORDER — FUROSEMIDE 20 MG/1
20 TABLET ORAL DAILY
Qty: 90 TABLET | Refills: 0 | Status: CANCELLED | OUTPATIENT
Start: 2021-09-20

## 2021-09-20 RX ORDER — TRAZODONE HYDROCHLORIDE 150 MG/1
150 TABLET ORAL NIGHTLY
OUTPATIENT
Start: 2021-09-20

## 2021-09-20 NOTE — TELEPHONE ENCOUNTER
Called both [facility] pharmacy and [facility] pharmacy and neither of them received either prescriptions.

## 2021-09-22 ENCOUNTER — OFFICE VISIT (OUTPATIENT)
Dept: ORTHOPEDIC SURGERY | Facility: CLINIC | Age: 66
End: 2021-09-22

## 2021-09-22 VITALS — WEIGHT: 155.6 LBS | OXYGEN SATURATION: 92 % | HEIGHT: 65 IN | HEART RATE: 112 BPM | BODY MASS INDEX: 25.92 KG/M2

## 2021-09-22 DIAGNOSIS — M25.551 RIGHT HIP PAIN: Primary | ICD-10-CM

## 2021-09-22 DIAGNOSIS — Z47.89 AFTERCARE FOLLOWING SURGERY OF THE MUSCULOSKELETAL SYSTEM: ICD-10-CM

## 2021-09-22 PROCEDURE — 99213 OFFICE O/P EST LOW 20 MIN: CPT | Performed by: PHYSICIAN ASSISTANT

## 2021-09-22 RX ORDER — MELOXICAM 15 MG/1
15 TABLET ORAL DAILY
Qty: 30 TABLET | Refills: 2 | Status: ON HOLD | OUTPATIENT
Start: 2021-09-22 | End: 2022-01-01

## 2021-09-22 NOTE — PATIENT INSTRUCTIONS
X-rays taken and reviewed with patient today.  Recommend she continues home exercises.  Mobic and Voltaren gel refilled.  Follow-up in March 2022 for her annual 1 year recheck on right KALE.

## 2021-09-22 NOTE — PROGRESS NOTES
"Chief Complaint  Pain of the Right Hip    Subjective          Annetta Malagon presents to [facility] for follow-up on right hip status post right KALE performed by Dr. Mendoza 3/22/2021. She states she is doing very well, has minimal pain in the hip, she is happy with her range of motion.  She has been taking aspirin and Voltaren gel for any discomfort.  She does state that her left hip is been hurting more but not enough to address it at this time.  Objective   Allergies   Allergen Reactions   • Duloxetine Hcl Hallucinations       Vital Signs:   Pulse 112   Ht 165.1 cm (65\")   Wt 70.6 kg (155 lb 9.6 oz)   SpO2 92%   BMI 25.89 kg/m²       Physical Exam  Constitutional:       Appearance: Normal appearance. Patient is well-developed and normal weight.   HENT:      Head: Normocephalic.      Right Ear: Hearing and external ear normal.      Left Ear: Hearing and external ear normal.      Nose: Nose normal.   Eyes:      Conjunctiva/sclera: Conjunctivae normal.   Cardiovascular:      Rate and Rhythm: Normal rate.   Pulmonary:      Effort: Pulmonary effort is normal.      Breath sounds: No wheezing or rales.   Abdominal:      Palpations: Abdomen is soft.      Tenderness: There is no abdominal tenderness.   Musculoskeletal:      Cervical back: Normal range of motion.   Skin:     Findings: No rash.   Neurological:      Mental Status: Patient is alert and oriented to person, place, and time.   Psychiatric:         Mood and Affect: Mood and affect normal.         Judgment: Judgment normal.     Ortho Exam  Right hip: Well-healed surgical incisions, no tenderness or swelling, good flexion and extension, good abduction, no pain with internal or external rotation, gait is nonantalgic, sensation light touch intact, posterior tib pulses 2+, good range of motion right ankle knee and digits.  Result Review :            Imaging Results (Most Recent)     Procedure Component Value Units Date/Time    XR Hip " With or Without Pelvis 2 - 3 View Right [[id number]] Resulted: 09/22/21 1050     Updated: 09/22/21 1050    Narrative:      X-Ray Report:  Study: X-rays ordered, taken in the office, and reviewed today  Site: Right hip xray  Indication: Pain  View: AP and Lateral view(s)  Findings: Hardware from total hip replacement intact, no acute osseous   abnormalities, malalignment or soft tissue swelling  Prior studies available for comparison: yes                   Assessment and Plan    Problem List Items Addressed This Visit        Musculoskeletal and Injuries    Right hip pain - Primary    Relevant Orders    XR Hip With or Without Pelvis 2 - 3 View Right (Completed)    Aftercare following surgery of the musculoskeletal system    Current Assessment & Plan     X-rays taken and reviewed with patient today.  Recommend she continues home exercises.  Mobic and Voltaren gel refilled.  Follow-up in March 2022 for her annual 1 year recheck on right KALE.               Follow Up   Return in about 6 months (around 3/15/2022) for Recheck.  Patient Instructions   X-rays taken and reviewed with patient today.  Recommend she continues home exercises.  Mobic and Voltaren gel refilled.  Follow-up in March 2022 for her annual 1 year recheck on right KALE.    Patient was given instructions and counseling regarding her condition or for health maintenance advice. Please see specific information pulled into the AVS if appropriate.

## 2021-09-24 ENCOUNTER — OFFICE VISIT (OUTPATIENT)
Dept: FAMILY MEDICINE CLINIC | Facility: CLINIC | Age: 66
End: 2021-09-24

## 2021-09-24 DIAGNOSIS — M25.551 RIGHT HIP PAIN: ICD-10-CM

## 2021-09-24 DIAGNOSIS — F31.77 BIPOLAR DISORDER, IN PARTIAL REMISSION, MOST RECENT EPISODE MIXED (HCC): Primary | ICD-10-CM

## 2021-09-24 DIAGNOSIS — F33.1 MAJOR DEPRESSIVE DISORDER, RECURRENT, MODERATE (HCC): ICD-10-CM

## 2021-09-24 DIAGNOSIS — F41.9 ANXIETY: ICD-10-CM

## 2021-09-24 DIAGNOSIS — K59.03 DRUG-INDUCED CONSTIPATION: ICD-10-CM

## 2021-09-24 DIAGNOSIS — M51.37 DEGENERATION OF LUMBOSACRAL INTERVERTEBRAL DISC: ICD-10-CM

## 2021-09-24 DIAGNOSIS — F33.1 MODERATE EPISODE OF RECURRENT MAJOR DEPRESSIVE DISORDER (HCC): ICD-10-CM

## 2021-09-24 PROCEDURE — 99214 OFFICE O/P EST MOD 30 MIN: CPT | Performed by: NURSE PRACTITIONER

## 2021-09-24 RX ORDER — ARIPIPRAZOLE 5 MG/1
5 TABLET ORAL DAILY
Qty: 30 TABLET | Refills: 1 | Status: SHIPPED | OUTPATIENT
Start: 2021-09-24 | End: 2021-11-22 | Stop reason: SDUPTHER

## 2021-09-24 RX ORDER — CYCLOBENZAPRINE HCL 10 MG
10 TABLET ORAL 3 TIMES DAILY PRN
Qty: 90 TABLET | Refills: 0 | Status: SHIPPED | OUTPATIENT
Start: 2021-09-24 | End: 2021-10-11 | Stop reason: SDUPTHER

## 2021-09-24 NOTE — PROGRESS NOTES
Chief Complaint  Dizziness (Doing a lot better), Anxiety (Patient is taking Abilfy, but feels like she needs an increase in this as she feels like it is not working the best. ), Depression, and Constipation (Patient was given Linzess, and it has worked so she would like to see if insurance will cover this. )    Subjective      Patient understanding that this is a telehealth visit.  I cannot perform a full physical exam, therefore something might be missed, or patient may need to come into the office for further evaluation.  Patient is understanding and consents to this type of visit.     History of Present Illness  Annetta Malagon presents to [facility]     Follow-up on anxiety, depression, bipolar disorder.  She has seen improvement with the addition of Abilify but would like the dose increased if possible.  She is currently on 2 mg.  She is having no negative side effects associated.    Constipation, she is having normal bowel movements with the samples that she was given.  She had diarrhea last night, but she thinks it may be something that she ate.  She would like a prescription sent over.    Muscle spasm, she needs a refill on Flexeril, this is typically prescribed by Ortho.      Past Medical History:   • Anxiety   • Arthritis   • Bipolar disorder (CMS/HCC)   • Bladder disorder   • Broken bones   • CHF (congestive heart failure) (CMS/HCC)   • Chronic pain   • Claustrophobia   • DM2 (diabetes mellitus, type 2) (CMS/HCC)   • GERD (gastroesophageal reflux disease)   • Hemorrhoids   • HLD (hyperlipidemia)   • HTN (hypertension)   • Hypothyroid   • Insomnia   • Insomnia, unspecified   • Kidney calculus   • Kidney disease   • Limb swelling   • Lung disease   • Major depressive disorder   • MDD (major depressive disorder)   • Osteoarthritis   • Osteopenia   • Reflux disease   • Thyroid disorder   • Thyroid trouble   • Type 2 diabetes mellitus (CMS/HCC)   • Varicose veins of both lower  extremities   • Vitamin D deficiency       Allergies  Duloxetine hcl    Past Surgical History:   • ANKLE SURGERY    Repair    • ANKLE SURGERY   • APPENDECTOMY   • COLONOSCOPY   • COLONOSCOPY   • GALLBLADDER SURGERY   • HEMORRHOIDECTOMY   • HEMORROIDECTOMY   • HIP SURGERY    broke   • HIP SURGERY    Broke   • HYSTERECTOMY   • KIDNEY SURGERY   • OTHER SURGICAL HISTORY    Surgical clips   • OTHER SURGICAL HISTORY    Surgical Clips   • TONSILLECTOMY   • TUBAL ABDOMINAL LIGATION       Social History     Tobacco Use   • Smoking status: Former Smoker     Packs/day: 2.00     Years: 48.00     Pack years: 96.00     Types: Cigarettes     Quit date:      Years since quittin.7   • Smokeless tobacco: Never Used   • Tobacco comment: no longer smokes   Vaping Use   • Vaping Use: Never used   Substance Use Topics   • Alcohol use: Yes     Comment: occasionally   • Drug use: Never       Family History   Problem Relation Age of Onset   • Liver cancer Mother    • Lung cancer Mother    • Ovarian cancer Mother    • Heart disease Mother    • Cancer Mother         unspecified/Unspecified   • Bone cancer Mother         Passed away at age 83 3/3/15   • Brain cancer Mother         Benign   • Alzheimer's disease Father         Passed away at age 83 2015   • Arthritis Father    • Stroke Father    • Osteoporosis Sister    • Arthritis Sister    • Heart disease Sister    • Colon cancer Maternal Cousin         2 cousins maternal side age 70 & 52 still living ages 70 and 65   • Diabetes Son         Unspecified   • Heart disease Sister    • Osteoporosis Sister    • Cancer Brother         Unspecified        Health Maintenance Due   Topic Date Due   • TDAP/TD VACCINES (1 - Tdap) Never done   • LUNG CANCER SCREENING  Never done   • HEPATITIS C SCREENING  Never done   • DIABETIC EYE EXAM  2021          Current Outpatient Medications:   •  acetaminophen (TYLENOL) 325 MG tablet, acetaminophen 325 mg tablet  TAKE TWO TABLETS BY MOUTH EVERY 4  HOURS AS NEEDED FOR mild PAIN, DO not exceed 3 grams/day, Disp: , Rfl:   •  ammonium lactate (AMLACTIN) 12 % cream, ammonium lactate 12 % topical cream  apply to the affected area EVERY DAY, Disp: , Rfl:   •  ARIPiprazole (Abilify) 5 MG tablet, Take 1 tablet by mouth Daily., Disp: 30 tablet, Rfl: 1  •  aspirin 81 MG chewable tablet, aspirin 81 mg oral tablet,chewable chew 1 tablet (81 mg) by oral route once daily   Suspended, Disp: , Rfl:   •  Cholecalciferol (Vitamin D3) 1.25 MG (35227 UT) capsule, TAKE ONE CAPSULE BY MOUTH every sunday, Disp: , Rfl:   •  cyclobenzaprine (FLEXERIL) 10 MG tablet, Take 1 tablet by mouth 3 (Three) Times a Day As Needed for Muscle Spasms., Disp: 90 tablet, Rfl: 0  •  Diclofenac Sodium (Voltaren) 1 % gel gel, Apply 4 g topically to the appropriate area as directed 4 (Four) Times a Day As Needed (PAIN)., Disp: 50 g, Rfl: 1  •  escitalopram (LEXAPRO) 10 MG tablet, Take 1 tablet by mouth Daily., Disp: 30 tablet, Rfl: 0  •  fluticasone (FLONASE) 50 MCG/ACT nasal spray, instill 2 sprays in each nostril every day AS NEEDED, Disp: , Rfl:   •  furosemide (LASIX) 20 MG tablet, Take 1 tablet by mouth Daily., Disp: 90 tablet, Rfl: 0  •  gabapentin (NEURONTIN) 800 MG tablet, Every 8 (Eight) Hours., Disp: , Rfl:   •  HYDROcodone-acetaminophen (NORCO) 7.5-325 MG per tablet, hydrocodone-acetaminophen 7.5-325 mg oral tablet take 1 tablet by oral route every 6 hours as needed for pain   Suspended, Disp: , Rfl:   •  meloxicam (MOBIC) 15 MG tablet, Take 1 tablet by mouth Daily., Disp: 30 tablet, Rfl: 2  •  Methylnaltrexone Bromide (Relistor) 150 MG tablet, Take 450 mg by mouth Daily., Disp: 90 tablet, Rfl: 1  •  nitroglycerin (Nitrostat) 0.4 MG SL tablet, Nitrostat 0.4 mg sublingual tablet, sublingual place 1 tablet (0.4 mg) by buccal route at the first sign of an attack; no more than 3 tabs are recommended within a 15 minute period.   Suspended, Disp: , Rfl:   •  oxyCODONE (ROXICODONE) 10 MG tablet,  oxycodone 10 mg tablet, Disp: , Rfl:   •  oxyCODONE-acetaminophen (PERCOCET)  MG per tablet, oxycodone-acetaminophen 10 mg-325 mg tablet  TAKE ONE TABLET BY MOUTH EVERY 6 HOURS AS NEEDED, Disp: , Rfl:   •  phentermine (ADIPEX-P) 37.5 MG tablet, Take 37.5 mg by mouth Daily., Disp: , Rfl:   •  rosuvastatin (CRESTOR) 10 MG tablet, Take 1 tablet by mouth Daily., Disp: 90 tablet, Rfl: 1  •  topiramate (TOPAMAX) 100 MG tablet, topiramate 100 mg tablet  TAKE ONE TABLET EVERY DAY, Disp: , Rfl:   •  traZODone (DESYREL) 150 MG tablet, Take 1 tablet by mouth Every Night., Disp: 90 tablet, Rfl: 0  •  triamcinolone (KENALOG) 0.1 % cream, triamcinolone acetonide 0.1 % topical cream, Disp: , Rfl:   •  linaclotide (Linzess) 72 MCG capsule capsule, Take 1 capsule by mouth Every Morning Before Breakfast., Disp: 30 capsule, Rfl: 5    Immunization History   Administered Date(s) Administered   • COVID-19 (AUTUMN) 05/03/2021   • Influenza, Unspecified 09/17/2018, 09/17/2018   • Pneumococcal Polysaccharide (PPSV23) 10/20/2014, 10/20/2014, 11/15/2019, 11/15/2019   • Shingrix 01/07/2021       Objective     There were no vitals filed for this visit.  There is no height or weight on file to calculate BMI.     Physical Exam    Gen: well-nourished, no acute distress  HENT: atraumatic, normocephalic  Eyes: extraocular movements intact, no scleral icterus  Lung: breathing comfortably, no cough  Skin: no visible rash, no lesions  Neuro: grossly oriented to person, place, and time. no facial droop   Psych: normal mood and affect      Result Review :                          Assessment and Plan     Diagnoses and all orders for this visit:    1. Bipolar disorder, in partial remission, most recent episode mixed (HCC) (Primary)  Comments:  Improved with the addition of Abilify continue that with Lexapro  Orders:  -     ARIPiprazole (Abilify) 5 MG tablet; Take 1 tablet by mouth Daily.  Dispense: 30 tablet; Refill: 1    2. Moderate episode of  recurrent major depressive disorder (HCC)  Comments:  Stable and improved  Orders:  -     ARIPiprazole (Abilify) 5 MG tablet; Take 1 tablet by mouth Daily.  Dispense: 30 tablet; Refill: 1    3. Anxiety  Comments:  Stable and improved  Orders:  -     ARIPiprazole (Abilify) 5 MG tablet; Take 1 tablet by mouth Daily.  Dispense: 30 tablet; Refill: 1    4. Major depressive disorder, recurrent, moderate (HCC)  Comments:  continue the lexapro and add abilify currently not to target but will continue to monitor closely  Orders:  -     ARIPiprazole (Abilify) 5 MG tablet; Take 1 tablet by mouth Daily.  Dispense: 30 tablet; Refill: 1    5. Right hip pain  Comments:  I gave her 1 refill of the muscle relaxers but she will need to continue to get that from her orthopedist if she wants to stay on it  Orders:  -     cyclobenzaprine (FLEXERIL) 10 MG tablet; Take 1 tablet by mouth 3 (Three) Times a Day As Needed for Muscle Spasms.  Dispense: 90 tablet; Refill: 0    6. Degeneration of lumbosacral intervertebral disc  Comments:  Currently seeing pain management  Orders:  -     cyclobenzaprine (FLEXERIL) 10 MG tablet; Take 1 tablet by mouth 3 (Three) Times a Day As Needed for Muscle Spasms.  Dispense: 90 tablet; Refill: 0    7. Drug-induced constipation  Comments:  Improved with Linzess  Orders:  -     linaclotide (Linzess) 72 MCG capsule capsule; Take 1 capsule by mouth Every Morning Before Breakfast.  Dispense: 30 capsule; Refill: 5            Follow Up     Return in about 4 weeks (around 10/22/2021) for Recheck.    Patient was given instructions and counseling regarding her condition or for health maintenance advice. Please see specific information pulled into the AVS if appropriate.     Annetta Malagon  reports that she quit smoking about 7 years ago. Her smoking use included cigarettes. She has a 96.00 pack-year smoking history. She has never used smokeless tobacco.           Sonia Ann, ELADIO

## 2021-09-29 ENCOUNTER — PATIENT OUTREACH (OUTPATIENT)
Dept: CASE MANAGEMENT | Facility: OTHER | Age: 66
End: 2021-09-29

## 2021-09-29 DIAGNOSIS — F32.2 MAJOR DEPRESSIVE DISORDER, SEVERE (HCC): ICD-10-CM

## 2021-09-29 DIAGNOSIS — T40.2X5A CONSTIPATION DUE TO OPIOID THERAPY: Primary | ICD-10-CM

## 2021-09-29 DIAGNOSIS — K59.03 CONSTIPATION DUE TO OPIOID THERAPY: Primary | ICD-10-CM

## 2021-09-29 DIAGNOSIS — F31.77 BIPOLAR DISORDER, IN PARTIAL REMISSION, MOST RECENT EPISODE MIXED (HCC): ICD-10-CM

## 2021-09-29 NOTE — OUTREACH NOTE
[facility] End of Month Documentation    This Chronic Medical Management Care Plan for Annetta Malagon, 65 y.o. female, has been monitored and managed;reviewed;revised and a new plan of care implemented for the month of September.  A cumulative time of 57  minutes was spent on this patient record this month, including face to face with provider;phone call with patient;chart review, call with patient, spoke with PCP at office and sent message for refill request per telephone outreach.    Regarding the patient's problems: has Right hip pain; Aftercare following surgery of the musculoskeletal system; Age related osteoporosis; Anxiety; Arthritis of right hip; Bilateral carpal tunnel syndrome; Bipolar disorder (CMS/[facility]); Bladder disorder; Chronic migraine; Broken bones; Congestive heart failure (CMS/[facility]); Degeneration of lumbosacral intervertebral disc; Diabetes mellitus, type II (CMS/[facility]); Drug-induced constipation; Chronic pain; Gastroesophageal reflux disease without esophagitis; Hypertension; Hemorrhoids; Hypothyroid; Lumbar spondylosis; Lumbosacral spondylosis without myelopathy; Major depressive disorder; Mixed hyperlipidemia; Osteoarthritis; Neuropathy; Osteoarthritis of hip; Primary insomnia; Varicose veins of both lower extremities; Vitamin D deficiency; and History of hip replacement on their problem list., the following items were addressed: medical records;medications, Saw PCP and has another follow up 9/24/21 got Linzess samples from office and PCP sent script doing well and any changes can be found within the plan section of the note.  A detailed listing of time spent for chronic care management is tracked within each outreach encounter.  Current medications include:  has a current medication list which includes the following prescription(s): acetaminophen, ammonium lactate, aripiprazole, aspirin, vitamin d3, cyclobenzaprine, diclofenac sodium, escitalopram, fluticasone, furosemide, gabapentin,  hydrocodone-acetaminophen, linaclotide, meloxicam, relistor, nitroglycerin, oxycodone, oxycodone-acetaminophen, phentermine, rosuvastatin, topiramate, trazodone, and triamcinolone. and the patient is reported to be patient is compliant with medication protocol, reports still only having small constipated stools, will discuss trying Linzess samples,  Medications are reported to be non-effective in controlling symptoms and changes have been made to the medication protocol, will try another medication with samples as the Linzess cost too much out of pocket. Using Linzess working well.      The patient was monitored remotely for activity level;mood & behavior;weight, reports now down to 149.9 pounds and has lost almost 54 pounds starting at 203 pounds, up and dancing and cleaning and great moods with Abilify.    The patient's physical needs include:  needs met.     The patient's mental support needs include:  needs met    The patient's cognitive support needs include:  needs met    The patient's psychosocial support needs include:  needs met    The patient's functional needs include: needs met    The patient's environmental needs include:  none    Care Plan overall comments:  No data recorded    Refer to previous outreach notes for more information on the areas listed above.    Monthly Billing Diagnoses  (K59.03,  T40.2X5A) Constipation due to opioid therapy    (F31.77) Bipolar disorder, in partial remission, most recent episode mixed (HCC)    (F32.2) Major depressive disorder, severe (HCC)    Medications   · Medications have been reconciled    Care Plan progress this month:      Recently Modified Care Plans Updates made since 8/29/2021 12:00 AM    No recently modified care plans.          Instructions   · Patient was provided an electronic copy of care plan  · CCM services were explained and offered and patient has accepted these services.  · Patient has given their written consent to receive CCM services and  understands that this includes the authorization of electronic communication of medical information with the other treating providers.  · Patient understands that they may stop CCM services at any time and these changes will be effective at the end of the calendar month and will effectively revocate the agreement of CCM services.  · Patient understands that only one practitioner can furnish and be paid for CCM services during one calendar month.  Patient also understands that there may be co-payment or deductible fees in association with CCM services.  · Patient will continue with at least monthly follow-up calls with the Nurse Navigator.    Sahara Zarate RN  Ambulatory Case Management    9/29/2021, 16:36 EDT

## 2021-09-29 NOTE — OUTREACH NOTE
Ambulatory Case Management Note    CCM Interim Update    Linzess samples given and reports on 9/24/21 follow up working well and having normal bowel movements and script sent per PCP. She increased Abilify and will follow up 4 weeks with PCP in October. Ortho follow up went well return 6 months.         There are no recently modified care plans to display for this patient.      Sahara Zarate RN  Ambulatory Case Management    9/29/2021, 16:30 EDT

## 2021-10-06 ENCOUNTER — PATIENT OUTREACH (OUTPATIENT)
Dept: CASE MANAGEMENT | Facility: OTHER | Age: 66
End: 2021-10-06

## 2021-10-06 ENCOUNTER — APPOINTMENT (OUTPATIENT)
Dept: MAMMOGRAPHY | Facility: HOSPITAL | Age: 66
End: 2021-10-06

## 2021-10-06 DIAGNOSIS — T40.2X5A CONSTIPATION DUE TO OPIOID THERAPY: Primary | ICD-10-CM

## 2021-10-06 DIAGNOSIS — G89.29 OTHER CHRONIC PAIN: ICD-10-CM

## 2021-10-06 DIAGNOSIS — F32.2 MAJOR DEPRESSIVE DISORDER, SEVERE (HCC): ICD-10-CM

## 2021-10-06 DIAGNOSIS — K59.03 CONSTIPATION DUE TO OPIOID THERAPY: Primary | ICD-10-CM

## 2021-10-06 DIAGNOSIS — F31.77 BIPOLAR DISORDER, IN PARTIAL REMISSION, MOST RECENT EPISODE MIXED (HCC): ICD-10-CM

## 2021-10-06 PROCEDURE — 99490 CHRNC CARE MGMT STAFF 1ST 20: CPT | Performed by: NURSE PRACTITIONER

## 2021-10-06 NOTE — OUTREACH NOTE
Ambulatory Case Management Note    [facility] Interim Update    Chart reviewed, per notes from last visit needs follow up with PCP around 10/22/21 and nothing scheduled, Abilify increased to 5 mg and Linzess sent in. Called an home phone is static, called cell and she is doing fine, she is at [facility] and requesting a call back later today.     Returned call, she reports she is having like mottled skin from being cold in her legs, arms, and chest areas for about 2 weeks now. She is always cold, unsure what it may be. She was going to car and it was raining and asked for a call back later.     Patient returned my call, gave follow up for PCP for 10/22/21 at 1 pm, for mottled skin and her needed f/u, she had to stop Linzess for a bit from having uncontrolled stools at night and is back on it as stools became hard again, educated if she gets loose stools to try every other day. Reports Abilify helping greatly.        Care Plan: Hypertension   Updates made since 10/6/2021 12:00 AM      Problem: PATIENT IS HYPERTENSIVE Resolved 10/6/2021      Goal: Remain at or Below Target Blood Pressure per /90 or less Completed 10/6/2021   Recent Progress: On track   Note:    Discussed with PCP and her range is 140/90 or less  Per discussion with patient she is around 120/80 on average but was not able to give me a full log of readings.      Task: Discuss steps to manage BP with patient Completed 10/6/2021   Outcome: Positive   Responsible User: Sahara Zarate, OLIVIA   Note:    Patient compliant with medications and low sodium diet, she is needing refill of diuretics today. Is not keeping blood pressure log at home.         Sahara Zarate RN  Ambulatory Case Management    10/6/2021, 16:02 EDT

## 2021-10-11 DIAGNOSIS — M51.37 DEGENERATION OF LUMBOSACRAL INTERVERTEBRAL DISC: ICD-10-CM

## 2021-10-11 DIAGNOSIS — M25.551 RIGHT HIP PAIN: ICD-10-CM

## 2021-10-11 RX ORDER — CYCLOBENZAPRINE HCL 10 MG
10 TABLET ORAL 3 TIMES DAILY PRN
Qty: 90 TABLET | Refills: 0 | Status: SHIPPED | OUTPATIENT
Start: 2021-10-11 | End: 2021-01-01 | Stop reason: SDUPTHER

## 2021-10-11 NOTE — TELEPHONE ENCOUNTER
Patient called last week and is requesting a refill on her Flexeril. She stated she forgot to ask you when she was here. She uses [facility]. Her phone number is [phone]/

## 2021-10-22 ENCOUNTER — OFFICE VISIT (OUTPATIENT)
Dept: FAMILY MEDICINE CLINIC | Facility: CLINIC | Age: 66
End: 2021-10-22

## 2021-10-22 DIAGNOSIS — F33.1 MODERATE EPISODE OF RECURRENT MAJOR DEPRESSIVE DISORDER (HCC): ICD-10-CM

## 2021-10-22 DIAGNOSIS — M16.11 PRIMARY OSTEOARTHRITIS OF RIGHT HIP: ICD-10-CM

## 2021-10-22 DIAGNOSIS — F41.9 ANXIETY: ICD-10-CM

## 2021-10-22 DIAGNOSIS — F31.77 BIPOLAR DISORDER, IN PARTIAL REMISSION, MOST RECENT EPISODE MIXED (HCC): ICD-10-CM

## 2021-10-22 DIAGNOSIS — K59.03 DRUG-INDUCED CONSTIPATION: ICD-10-CM

## 2021-10-22 DIAGNOSIS — R21 RASH AND NONSPECIFIC SKIN ERUPTION: Primary | ICD-10-CM

## 2021-10-22 PROBLEM — E11.9 DIABETES MELLITUS, TYPE II: Status: RESOLVED | Noted: 2021-07-26 | Resolved: 2021-10-22

## 2021-10-22 PROCEDURE — 99214 OFFICE O/P EST MOD 30 MIN: CPT | Performed by: NURSE PRACTITIONER

## 2021-10-22 RX ORDER — LINACLOTIDE 72 UG/1
CAPSULE, GELATIN COATED ORAL
COMMUNITY
Start: 2021-09-24 | End: 2021-10-22

## 2021-10-22 RX ORDER — CYCLOBENZAPRINE HCL 10 MG
TABLET ORAL
COMMUNITY
Start: 2021-10-11 | End: 2021-11-01 | Stop reason: SDUPTHER

## 2021-10-22 RX ORDER — ARIPIPRAZOLE 5 MG/1
TABLET ORAL
COMMUNITY
Start: 2021-09-24 | End: 2021-01-01 | Stop reason: SDUPTHER

## 2021-10-22 RX ORDER — OXYCODONE AND ACETAMINOPHEN 10; 325 MG/1; MG/1
TABLET ORAL
COMMUNITY
Start: 2021-10-12 | End: 2021-01-01 | Stop reason: SDUPTHER

## 2021-10-22 NOTE — PROGRESS NOTES
Chief Complaint  Follow-up (Pt calling in for a f/u) and Leg Pain (Pt is having some leg pain)    Subjective      History of Present Illness  Annetta Malagon presents to [facility]     Patient understanding that this is a telehealth visit.  I cannot perform a full physical exam, therefore something might be missed, or patient may need to come into the office for further evaluation.  Patient is understanding and consents to this type of visit.    [facility], pt was at home alone on the phone call.     She is having anterior hip pain since her surgery 8 months ago. She is seeing pain management for her back. She is unable to go to physical therapy because she can't afford it. She is using an otc form of biofreeze.     Rash on her whole body for a few months, she is willing to see a dermatologist. It is itchy.     Constipation, says she hasn't had a bm in 3 weeks, she is taking linzess daily. She says she eats very little. Yesterday she had yogurt in the morning, chicken salad sandwich for lunch, lasagna, salad and cake for supper.  She declines    She wants me to take over filling her flexeril. Ortho was rx'ing that for her.     Past Medical History:   • Anxiety   • Arthritis   • Bipolar disorder ([facility])   • Bladder disorder   • Broken bones   • CHF (congestive heart failure) ([facility])   • Chronic pain   • Claustrophobia   • DM2 (diabetes mellitus, type 2) ([facility])   • GERD (gastroesophageal reflux disease)   • Hemorrhoids   • HLD (hyperlipidemia)   • HTN (hypertension)   • Hypothyroid   • Insomnia   • Insomnia, unspecified   • Kidney calculus   • Kidney disease   • Limb swelling   • Lung disease   • Major depressive disorder   • MDD (major depressive disorder)   • Osteoarthritis   • Osteopenia   • Reflux disease   • Thyroid disorder   • Thyroid trouble   • Type 2 diabetes mellitus ([facility])   • Varicose veins of both lower extremities   • Vitamin D deficiency        Allergies  Duloxetine hcl    Past Surgical History:   • ANKLE SURGERY    Repair    • ANKLE SURGERY   • APPENDECTOMY   • COLONOSCOPY   • COLONOSCOPY   • GALLBLADDER SURGERY   • HEMORRHOIDECTOMY   • HEMORROIDECTOMY   • HIP SURGERY    broke   • HIP SURGERY    Broke   • HYSTERECTOMY   • KIDNEY SURGERY   • OTHER SURGICAL HISTORY    Surgical clips   • OTHER SURGICAL HISTORY    Surgical Clips   • TONSILLECTOMY   • TUBAL ABDOMINAL LIGATION       Social History     Tobacco Use   • Smoking status: Former Smoker     Packs/day: 2.00     Years: 48.00     Pack years: 96.00     Types: Cigarettes     Quit date:      Years since quittin.8   • Smokeless tobacco: Never Used   • Tobacco comment: no longer smokes   Vaping Use   • Vaping Use: Never used   Substance Use Topics   • Alcohol use: Yes     Comment: occasionally   • Drug use: Never       Family History   Problem Relation Age of Onset   • Liver cancer Mother    • Lung cancer Mother    • Ovarian cancer Mother    • Heart disease Mother    • Cancer Mother         unspecified/Unspecified   • Bone cancer Mother         Passed away at age 83 3/3/15   • Brain cancer Mother         Benign   • Alzheimer's disease Father         Passed away at age 83 2015   • Arthritis Father    • Stroke Father    • Osteoporosis Sister    • Arthritis Sister    • Heart disease Sister    • Colon cancer Maternal Cousin         2 cousins maternal side age 70 & 52 still living ages 70 and 65   • Diabetes Son         Unspecified   • Heart disease Sister    • Osteoporosis Sister    • Cancer Brother         Unspecified        Health Maintenance Due   Topic Date Due   • TDAP/TD VACCINES (1 - Tdap) Never done   • LUNG CANCER SCREENING  Never done   • HEPATITIS C SCREENING  Never done   • INFLUENZA VACCINE  2021   • DIABETIC EYE EXAM  2021          Current Outpatient Medications:   •  acetaminophen (TYLENOL) 325 MG tablet, acetaminophen 325 mg tablet  TAKE TWO TABLETS BY MOUTH EVERY 4  HOURS AS NEEDED FOR mild PAIN, DO not exceed 3 grams/day, Disp: , Rfl:   •  ammonium lactate (AMLACTIN) 12 % cream, ammonium lactate 12 % topical cream  apply to the affected area EVERY DAY, Disp: , Rfl:   •  ARIPiprazole (Abilify) 5 MG tablet, Take 1 tablet by mouth Daily., Disp: 30 tablet, Rfl: 1  •  ARIPiprazole (ABILIFY) 5 MG tablet, , Disp: , Rfl:   •  aspirin 81 MG chewable tablet, aspirin 81 mg oral tablet,chewable chew 1 tablet (81 mg) by oral route once daily   Suspended, Disp: , Rfl:   •  Cholecalciferol (Vitamin D3) 1.25 MG (46468 UT) capsule, TAKE ONE CAPSULE BY MOUTH every sunday, Disp: , Rfl:   •  cyclobenzaprine (FLEXERIL) 10 MG tablet, Take 1 tablet by mouth 3 (Three) Times a Day As Needed for Muscle Spasms., Disp: 90 tablet, Rfl: 0  •  cyclobenzaprine (FLEXERIL) 10 MG tablet, , Disp: , Rfl:   •  Diclofenac Sodium (Voltaren) 1 % gel gel, Apply 4 g topically to the appropriate area as directed 4 (Four) Times a Day As Needed (PAIN)., Disp: 50 g, Rfl: 1  •  escitalopram (LEXAPRO) 10 MG tablet, Take 1 tablet by mouth Daily., Disp: 30 tablet, Rfl: 0  •  fluticasone (FLONASE) 50 MCG/ACT nasal spray, instill 2 sprays in each nostril every day AS NEEDED, Disp: , Rfl:   •  furosemide (LASIX) 20 MG tablet, Take 1 tablet by mouth Daily., Disp: 90 tablet, Rfl: 0  •  gabapentin (NEURONTIN) 800 MG tablet, Every 8 (Eight) Hours., Disp: , Rfl:   •  HYDROcodone-acetaminophen (NORCO) 7.5-325 MG per tablet, hydrocodone-acetaminophen 7.5-325 mg oral tablet take 1 tablet by oral route every 6 hours as needed for pain   Suspended, Disp: , Rfl:   •  linaclotide (Linzess) 145 MCG capsule capsule, Take 1 capsule by mouth Every Morning Before Breakfast., Disp: 30 capsule, Rfl: 2  •  meloxicam (MOBIC) 15 MG tablet, Take 1 tablet by mouth Daily., Disp: 30 tablet, Rfl: 2  •  Methylnaltrexone Bromide (Relistor) 150 MG tablet, Take 450 mg by mouth Daily., Disp: 90 tablet, Rfl: 1  •  nitroglycerin (Nitrostat) 0.4 MG SL tablet,  Nitrostat 0.4 mg sublingual tablet, sublingual place 1 tablet (0.4 mg) by buccal route at the first sign of an attack; no more than 3 tabs are recommended within a 15 minute period.   Suspended, Disp: , Rfl:   •  oxyCODONE (ROXICODONE) 10 MG tablet, oxycodone 10 mg tablet, Disp: , Rfl:   •  oxyCODONE-acetaminophen (PERCOCET)  MG per tablet, oxycodone-acetaminophen 10 mg-325 mg tablet  TAKE ONE TABLET BY MOUTH EVERY 6 HOURS AS NEEDED, Disp: , Rfl:   •  oxyCODONE-acetaminophen (PERCOCET)  MG per tablet, , Disp: , Rfl:   •  phentermine (ADIPEX-P) 37.5 MG tablet, Take 37.5 mg by mouth Daily., Disp: , Rfl:   •  rosuvastatin (CRESTOR) 10 MG tablet, Take 1 tablet by mouth Daily., Disp: 90 tablet, Rfl: 1  •  topiramate (TOPAMAX) 100 MG tablet, topiramate 100 mg tablet  TAKE ONE TABLET EVERY DAY, Disp: , Rfl:   •  traZODone (DESYREL) 150 MG tablet, Take 1 tablet by mouth Every Night., Disp: 90 tablet, Rfl: 0  •  triamcinolone (KENALOG) 0.1 % cream, triamcinolone acetonide 0.1 % topical cream, Disp: , Rfl:     Immunization History   Administered Date(s) Administered   • COVID-19 (AUTUMN) 05/03/2021   • Influenza, Unspecified 09/17/2018, 09/17/2018   • Pneumococcal Polysaccharide (PPSV23) 10/20/2014, 10/20/2014, 11/15/2019, 11/15/2019   • Shingrix 01/07/2021       Objective     There were no vitals filed for this visit.  There is no height or weight on file to calculate BMI.     Review of Systems    Physical Exam    Gen: well-nourished, no acute distress  HENT: atraumatic, normocephalic  Eyes: extraocular movements intact, no scleral icterus  Lung: breathing comfortably, no cough  Skin: She has a red lacy rash on her upper and lower body.   Neuro: grossly oriented to person, place, and time. no facial droop   Psych: normal mood and affect          Result Review :    The following data was reviewed by: ELADIO Grove on 10/22/2021:       Depression: At risk   • PHQ-2 Score: 5       Common labs     Common Labsle 3/23/21 3/23/21 3/24/21 3/24/21 4/30/21    0419 0419 0431 0431    Glucose  104 (A)  125 (A) 104 (A)   BUN  14  12 17   Creatinine  0.72  0.65 1.02 (A)   Sodium  134 (A)  137 142   Potassium  3.9  3.3 (A) 3.8   Chloride  101  103 101   Calcium  8.0 (A)  8.4 (A) 9.1   Albumin     3.9   Total Bilirubin     0.28   Alkaline Phosphatase     104   AST (SGOT)     19   ALT (SGPT)     11   WBC   6.06  6.80   Hemoglobin 10.3 (A)  9.7 (A)  12.8   Hematocrit 32.9 (A)  30.3 (A)  41.3   Platelets   138  286   Total Cholesterol     175   Triglycerides     118   HDL Cholesterol     71 (A)   LDL Cholesterol      80   (A) Abnormal value       Comments are available for some flowsheets but are not being displayed.             Data reviewed: Consultant notes ortho sept 2021              Assessment and Plan     Diagnoses and all orders for this visit:    1. Rash and nonspecific skin eruption (Primary)  Comments:  try benadryl oral to see if it goes away, since she has had it for a month, will refer to dermatology for eval.   Orders:  -     Ambulatory Referral to Dermatology    2. Primary osteoarthritis of right hip  Comments:  cont f/u with ortho and encouraged physical therapy, she declines PT    3. Anxiety  Comments:  improved with abilify    4. Bipolar disorder, in partial remission, most recent episode mixed (HCC)  Comments:  improved with abilify    5. Moderate episode of recurrent major depressive disorder (HCC)  Comments:  improved with abilify    6. Drug-induced constipation  Comments:  increase linzess and re eval. add fiber to diet.     Other orders  -     linaclotide (Linzess) 145 MCG capsule capsule; Take 1 capsule by mouth Every Morning Before Breakfast.  Dispense: 30 capsule; Refill: 2        I spent 32 minutes caring for Annetta on this date of service. This time includes time spent by me in the following activities:preparing for the visit, reviewing tests, obtaining and/or reviewing a separately obtained  history, counseling and educating the patient/family/caregiver, referring and communicating with other health care professionals , documenting information in the medical record and care coordination    Follow Up     Return in about 3 months (around 1/22/2022) for Next scheduled follow up.    Patient was given instructions and counseling regarding her condition or for health maintenance advice. Please see specific information pulled into the AVS if appropriate.     Annetta Malagon  reports that she quit smoking about 7 years ago. Her smoking use included cigarettes. She has a 96.00 pack-year smoking history. She has never used smokeless tobacco.           Sonia Ann, APRN

## 2021-10-26 ENCOUNTER — PATIENT OUTREACH (OUTPATIENT)
Dept: CASE MANAGEMENT | Facility: OTHER | Age: 66
End: 2021-10-26

## 2021-10-26 DIAGNOSIS — F31.77 BIPOLAR DISORDER, IN PARTIAL REMISSION, MOST RECENT EPISODE MIXED (HCC): ICD-10-CM

## 2021-10-26 DIAGNOSIS — T40.2X5A CONSTIPATION DUE TO OPIOID THERAPY: Primary | ICD-10-CM

## 2021-10-26 DIAGNOSIS — F32.2 MAJOR DEPRESSIVE DISORDER, SEVERE (HCC): ICD-10-CM

## 2021-10-26 DIAGNOSIS — K59.03 CONSTIPATION DUE TO OPIOID THERAPY: Primary | ICD-10-CM

## 2021-10-26 NOTE — OUTREACH NOTE
[facility] End of Month Documentation    This Chronic Medical Management Care Plan for Annetta Malagon, 65 y.o. female, has been monitored and managed; reviewed; complete; revised and a new plan of care implemented for the month of October.  A cumulative time of 36  minutes was spent on this patient record this month, including face to face with provider; phone call with patient; chart review.    Regarding the patient's problems: has Right hip pain; Aftercare following surgery of the musculoskeletal system; Age related osteoporosis; Anxiety; Arthritis of right hip; Bilateral carpal tunnel syndrome; Bipolar disorder (HCC); Bladder disorder; Chronic migraine; Broken bones; Congestive heart failure (HCC); Degeneration of lumbosacral intervertebral disc; Drug-induced constipation; Chronic pain; Gastroesophageal reflux disease without esophagitis; Hypertension; Hemorrhoids; Hypothyroid; Lumbar spondylosis; Lumbosacral spondylosis without myelopathy; Major depressive disorder; Mixed hyperlipidemia; Osteoarthritis; Neuropathy; Osteoarthritis of hip; Primary insomnia; Varicose veins of both lower extremities; Vitamin D deficiency; and History of hip replacement on their problem list., the following items were addressed: medical records; medications, Linzess working and Abilify doing well and any changes can be found within the plan section of the note.  A detailed listing of time spent for chronic care management is tracked within each outreach encounter.  Current medications include:  has a current medication list which includes the following prescription(s): acetaminophen, ammonium lactate, aripiprazole, aripiprazole, aspirin, vitamin d3, cyclobenzaprine, cyclobenzaprine, diclofenac sodium, escitalopram, fluticasone, furosemide, gabapentin, hydrocodone-acetaminophen, linaclotide, meloxicam, relistor, nitroglycerin, oxycodone, oxycodone-acetaminophen, oxycodone-acetaminophen, phentermine, rosuvastatin, topiramate, trazodone, and  triamcinolone. and the patient is reported to be patient is compliant with medication protocol, stools improved with Linzess but got too loose, she stopped x 3 days then restarted, educated to try QOD if stools become too loose again,  Medications are reported to be effective.  Regarding these diagnoses, referrals were made to the following provider(s):  Dermatology.    The patient was monitored remotely for blood pressure; activity level; mood & behavior, reports blood pressures are not an issue, updated care plan, getting out and about and mood and behavior much improved with Abilify.    The patient's physical needs include:  medication education, educated on use of Linzess.     The patient's mental support needs include:  continued support, medication working well and CCM for support as needed    The patient's cognitive support needs include:  needs met    The patient's psychosocial support needs include:  needs met    The patient's functional needs include: needs met    The patient's environmental needs include:  none    Care Plan overall comments:  No data recorded    Refer to previous outreach notes for more information on the areas listed above.    Monthly Billing Diagnoses  No diagnosis found.    Medications   · Medications have been reconciled    Care Plan progress this month:      Recently Modified Care Plans Updates made since 9/25/2021 12:00 AM     Hypertension         Problem Priority Last Modified     PATIENT IS HYPERTENSIVE --  10/6/2021  4:02 PM by Sahara Zarate RN              Goal Recent Progress Last Modified     Remain at or Below Target Blood Pressure per /90 or less --  10/6/2021  4:02 PM by Sahara Zarate RN     Discussed with PCP and her range is 140/90 or less  Per discussion with patient she is around 120/80 on average but was not able to give me a full log of readings.              Task Due Date Last Modified     Discuss steps to manage BP with patient --  10/6/2021  4:02 PM by  Sahara Zarate, RN     Patient compliant with medications and low sodium diet, she is needing refill of diuretics today. Is not keeping blood pressure log at home.                       Instructions   · Patient was provided an electronic copy of care plan  · CCM services were explained and offered and patient has accepted these services.  · Patient has given their written consent to receive CCM services and understands that this includes the authorization of electronic communication of medical information with the other treating providers.  · Patient understands that they may stop CCM services at any time and these changes will be effective at the end of the calendar month and will effectively revocate the agreement of CCM services.  · Patient understands that only one practitioner can furnish and be paid for CCM services during one calendar month.  Patient also understands that there may be co-payment or deductible fees in association with CCM services.  · Patient will continue with at least monthly follow-up calls with the Nurse Navigator.    Sahara Zarate RN  Ambulatory Case Management    10/26/2021, 13:39 EDT

## 2021-11-01 DIAGNOSIS — F33.1 MODERATE EPISODE OF RECURRENT MAJOR DEPRESSIVE DISORDER (HCC): ICD-10-CM

## 2021-11-01 RX ORDER — GABAPENTIN 800 MG/1
TABLET ORAL EVERY 8 HOURS SCHEDULED
Status: CANCELLED | OUTPATIENT
Start: 2021-11-01

## 2021-11-01 NOTE — TELEPHONE ENCOUNTER
Caller: MURALI    Relationship: [facility]    Requested Prescriptions:   Requested Prescriptions     Pending Prescriptions Disp Refills   • topiramate (TOPAMAX) 100 MG tablet     • cyclobenzaprine (FLEXERIL) 10 MG tablet     • Cholecalciferol (Vitamin D3) 1.25 MG (98441 UT) capsule 1 capsule    • gabapentin (NEURONTIN) 800 MG tablet       Sig: Every 8 (Eight) Hours.   • escitalopram (LEXAPRO) 10 MG tablet 30 tablet 0     Sig: Take 1 tablet by mouth Daily.        Pharmacy where request should be sent: [facility] - [place] [id number] - [phone]  - [phone]     Best call back number: [phone]    Does the patient have less than a 3 day supply:  [x] Yes  [] No    Myriam Eaton Rep   11/01/21 10:52 EDT

## 2021-11-02 RX ORDER — CYCLOBENZAPRINE HCL 10 MG
10 TABLET ORAL 2 TIMES DAILY PRN
Qty: 60 TABLET | Refills: 0 | Status: SHIPPED | OUTPATIENT
Start: 2021-11-02 | End: 2021-01-01 | Stop reason: SDUPTHER

## 2021-11-02 RX ORDER — TOPIRAMATE 100 MG/1
100 TABLET, FILM COATED ORAL DAILY
Qty: 30 TABLET | Refills: 0 | Status: SHIPPED | OUTPATIENT
Start: 2021-11-02 | End: 2021-01-01 | Stop reason: SDUPTHER

## 2021-11-02 RX ORDER — ESCITALOPRAM OXALATE 10 MG/1
10 TABLET ORAL DAILY
Qty: 30 TABLET | Refills: 0 | Status: SHIPPED | OUTPATIENT
Start: 2021-11-02 | End: 2021-01-01 | Stop reason: SDUPTHER

## 2021-11-02 RX ORDER — CHOLECALCIFEROL (VITAMIN D3) 1250 MCG
50000 CAPSULE ORAL
Qty: 5 CAPSULE | Refills: 0 | Status: SHIPPED | OUTPATIENT
Start: 2021-11-02 | End: 2021-01-01 | Stop reason: SDUPTHER

## 2021-11-04 RX ORDER — GABAPENTIN 800 MG/1
TABLET ORAL EVERY 8 HOURS SCHEDULED
OUTPATIENT
Start: 2021-11-04

## 2021-11-04 NOTE — TELEPHONE ENCOUNTER
Caller: Annetta Malagon    Relationship: Self      Requested Prescriptions:   Requested Prescriptions     Pending Prescriptions Disp Refills   • gabapentin (NEURONTIN) 800 MG tablet       Sig: Every 8 (Eight) Hours.        Pharmacy where request should be sent: [facility] - [facility] [address] - [phone]  - [phone] FX  [phone]      Best call back number: [phone]     Does the patient have less than a 3 day supply:  [x] Yes  [] No    Myriam Le Rep   11/04/21 15:41 EDT

## 2021-11-05 ENCOUNTER — TELEPHONE (OUTPATIENT)
Dept: FAMILY MEDICINE CLINIC | Facility: CLINIC | Age: 66
End: 2021-11-05

## 2021-11-05 DIAGNOSIS — F33.1 MODERATE EPISODE OF RECURRENT MAJOR DEPRESSIVE DISORDER (HCC): ICD-10-CM

## 2021-11-22 DIAGNOSIS — F33.1 MAJOR DEPRESSIVE DISORDER, RECURRENT, MODERATE (HCC): ICD-10-CM

## 2021-11-22 DIAGNOSIS — F41.9 ANXIETY: ICD-10-CM

## 2021-11-22 DIAGNOSIS — F31.77 BIPOLAR DISORDER, IN PARTIAL REMISSION, MOST RECENT EPISODE MIXED (HCC): ICD-10-CM

## 2021-11-22 DIAGNOSIS — F33.1 MODERATE EPISODE OF RECURRENT MAJOR DEPRESSIVE DISORDER (HCC): ICD-10-CM

## 2021-11-22 RX ORDER — ARIPIPRAZOLE 5 MG/1
5 TABLET ORAL DAILY
Qty: 30 TABLET | Refills: 3 | Status: SHIPPED | OUTPATIENT
Start: 2021-11-22 | End: 2021-11-22 | Stop reason: SDUPTHER

## 2021-11-22 NOTE — TELEPHONE ENCOUNTER
Caller: Annetta Malagon    Relationship: Self    Best call back number: [phone]    Requested Prescriptions:   Requested Prescriptions     Pending Prescriptions Disp Refills   • Diclofenac Sodium (Voltaren) 1 % gel gel 50 g 1     Sig: Apply 4 g topically to the appropriate area as directed 4 (Four) Times a Day As Needed (PAIN).   • ARIPiprazole (Abilify) 5 MG tablet 30 tablet 3     Sig: Take 1 tablet by mouth Daily.        Pharmacy where request should be sent: [facility] - [place] [id number] - [phone] [facility] [phone] FX     Additional details provided by patient: THE PATIENT STATED HER MEDICATIONS NEED TO GO TO [facility] INSTEAD OF [facility]. SHE IS ALMOST OUT AND NEEDS THESE CALLED IN [facility].    Does the patient have less than a 3 day supply:  [x] Yes  [] No    Myriam Cisneros Rep   11/22/21 13:16 EST

## 2021-11-23 ENCOUNTER — PATIENT OUTREACH (OUTPATIENT)
Dept: CASE MANAGEMENT | Facility: OTHER | Age: 66
End: 2021-11-23

## 2021-11-23 DIAGNOSIS — K59.03 CONSTIPATION DUE TO OPIOID THERAPY: Primary | ICD-10-CM

## 2021-11-23 DIAGNOSIS — F41.9 ANXIETY: ICD-10-CM

## 2021-11-23 DIAGNOSIS — F33.1 MAJOR DEPRESSIVE DISORDER, RECURRENT, MODERATE (HCC): ICD-10-CM

## 2021-11-23 DIAGNOSIS — F33.1 MODERATE EPISODE OF RECURRENT MAJOR DEPRESSIVE DISORDER (HCC): ICD-10-CM

## 2021-11-23 DIAGNOSIS — T40.2X5A CONSTIPATION DUE TO OPIOID THERAPY: Primary | ICD-10-CM

## 2021-11-23 DIAGNOSIS — F31.77 BIPOLAR DISORDER, IN PARTIAL REMISSION, MOST RECENT EPISODE MIXED (HCC): ICD-10-CM

## 2021-11-23 RX ORDER — ARIPIPRAZOLE 5 MG/1
5 TABLET ORAL DAILY
Qty: 30 TABLET | Refills: 2 | Status: SHIPPED | OUTPATIENT
Start: 2021-11-23 | End: 2021-11-23 | Stop reason: SDUPTHER

## 2021-11-23 RX ORDER — ARIPIPRAZOLE 5 MG/1
5 TABLET ORAL DAILY
Qty: 90 TABLET | Refills: 0 | Status: SHIPPED | OUTPATIENT
Start: 2021-11-23 | End: 2021-01-01 | Stop reason: SDUPTHER

## 2021-11-23 NOTE — OUTREACH NOTE
Ambulatory Case Management Note    CCM Interim Update    Patient chart reviewed, multiple telephone notes for refills needed in system. Per [facility] pharmacy and patient. I called patient, she is doing well. Constipation and depression are improved and doing well. She obtained her COVID vacine booster at [facility] along with her Flu shot. I will do immunization search to update GAPS. She again reports she is not diabetic and had just a regular eye exam. She did not see dermatology because her skin is improved. She got a 2 week supply of Neurontin but needs her full script and her Abilify scripts to go to [facility] and not [facility]. I will discuss with PCP. She will call me back later today to update.   Care gaps will adjust once immunization reconcile completes. If not will call SDP for immunizations.   See notes on face to face in office communication with PCP and nurse.         There are no recently modified care plans to display for this patient.      Sahara Zarate RN  Ambulatory Case Management    11/23/2021, 13:00 EST

## 2021-11-23 NOTE — TELEPHONE ENCOUNTER
Per Bib, Patient's Abilify needs to go to mail order pharmacy. RX sent to [facility] mail order pharmacy

## 2021-11-24 ENCOUNTER — PATIENT OUTREACH (OUTPATIENT)
Dept: CASE MANAGEMENT | Facility: OTHER | Age: 66
End: 2021-11-24

## 2021-11-24 DIAGNOSIS — F41.9 ANXIETY: Primary | ICD-10-CM

## 2021-11-24 DIAGNOSIS — F32.2 MAJOR DEPRESSIVE DISORDER, SEVERE (HCC): ICD-10-CM

## 2021-11-24 PROCEDURE — 99490 CHRNC CARE MGMT STAFF 1ST 20: CPT | Performed by: NURSE PRACTITIONER

## 2021-11-24 PROCEDURE — 99439 CHRNC CARE MGMT STAF EA ADDL: CPT | Performed by: NURSE PRACTITIONER

## 2021-11-29 ENCOUNTER — PATIENT OUTREACH (OUTPATIENT)
Dept: CASE MANAGEMENT | Facility: OTHER | Age: 66
End: 2021-11-29

## 2021-11-29 DIAGNOSIS — G89.29 OTHER CHRONIC PAIN: ICD-10-CM

## 2021-11-29 DIAGNOSIS — F41.9 ANXIETY: Primary | ICD-10-CM

## 2021-11-29 DIAGNOSIS — T40.2X5A CONSTIPATION DUE TO OPIOID THERAPY: ICD-10-CM

## 2021-11-29 DIAGNOSIS — K59.03 CONSTIPATION DUE TO OPIOID THERAPY: ICD-10-CM

## 2021-11-29 DIAGNOSIS — I10 HYPERTENSION, UNSPECIFIED TYPE: ICD-10-CM

## 2021-11-29 NOTE — OUTREACH NOTE
[facility] End of Month Documentation    This Chronic Medical Management Care Plan for Annetta Malagon, 65 y.o. female, has been monitored and managed; reviewed; complete and a new plan of care implemented for the month of November.  A cumulative time of 41  minutes was spent on this patient record this month, including face to face with provider; phone call with patient; chart review, call with patient, spoke with PCP at office and sent message for refill request per telephone outreach.    Regarding the patient's problems: has Right hip pain; Aftercare following surgery of the musculoskeletal system; Age related osteoporosis; Anxiety; Arthritis of right hip; Bilateral carpal tunnel syndrome; Bipolar disorder (HCC); Bladder disorder; Chronic migraine; Broken bones; Congestive heart failure (HCC); Degeneration of lumbosacral intervertebral disc; Drug-induced constipation; Chronic pain; Gastroesophageal reflux disease without esophagitis; Hypertension; Hemorrhoids; Hypothyroid; Lumbar spondylosis; Lumbosacral spondylosis without myelopathy; Major depressive disorder; Mixed hyperlipidemia; Osteoarthritis; Neuropathy; Osteoarthritis of hip; Primary insomnia; Varicose veins of both lower extremities; Vitamin D deficiency; and History of hip replacement on their problem list., the following items were addressed: medical records; medications, Linzess working and Abilify doing well and any changes can be found within the plan section of the note.  A detailed listing of time spent for chronic care management is tracked within each outreach encounter.  Current medications include:  has a current medication list which includes the following prescription(s): acetaminophen, ammonium lactate, aripiprazole, aripiprazole, aspirin, vitamin d3, cyclobenzaprine, cyclobenzaprine, diclofenac sodium, escitalopram, fluticasone, furosemide, gabapentin, hydrocodone-acetaminophen, linaclotide, meloxicam, relistor, nitroglycerin, oxycodone,  oxycodone-acetaminophen, oxycodone-acetaminophen, phentermine, rosuvastatin, topiramate, trazodone, and triamcinolone. and the patient is reported to be patient is compliant with medication protocol,  Medications are reported to be effective.      The patient was monitored remotely for blood pressure, reports normal and care plan completed.    The patient's physical needs include:  needs met.     The patient's mental support needs include:  continued support    The patient's cognitive support needs include:  needs met    The patient's psychosocial support needs include:  needs met    The patient's functional needs include: needs met    The patient's environmental needs include:  none    Care Plan overall comments:  care plan completed    Refer to previous outreach notes for more information on the areas listed above.    Monthly Billing Diagnoses  (F41.9) Anxiety    (K59.03,  T40.2X5A) Constipation due to opioid therapy    (G89.29) Other chronic pain    (I10) Hypertension, unspecified type    Medications   · Medications have been reconciled    Care Plan progress this month:      Recently Modified Care Plans Updates made since 10/29/2021 12:00 AM     Hypertension         Problem Priority Last Modified     BLOOD PRESSURE MONITORING --  6/22/2021 10:34 AM by Sahara Zarate RN              Goal Recent Progress Last Modified     Establish Plan for Regular Lab Work --  11/24/2021  1:41 PM by Sahara Zarate RN              Task Due Date Last Modified     Review the patients last urine protein. Discuss need if patient has not had one. --  11/24/2021  1:41 PM by Sahara Zarate RN     Per PCP not needed 11/23/21                         Instructions   · Patient was provided an electronic copy of care plan  · CCM services were explained and offered and patient has accepted these services.  · Patient has given their written consent to receive CCM services and understands that this includes the authorization of electronic  communication of medical information with the other treating providers.  · Patient understands that they may stop CCM services at any time and these changes will be effective at the end of the calendar month and will effectively revocate the agreement of CCM services.  · Patient understands that only one practitioner can furnish and be paid for CCM services during one calendar month.  Patient also understands that there may be co-payment or deductible fees in association with CCM services.  · Patient will continue with at least monthly follow-up calls with the Nurse Navigator.    Sahara Zarate RN  Ambulatory Case Management    11/29/2021, 09:48 EST

## 2021-12-01 NOTE — TELEPHONE ENCOUNTER
PATIENT CALLED AND STATES HER PRESCRIPTIONS FOR VITAMIN D AND THE CREAM WERE SUPPOSED TO BE SENT TO [facility]. PATIENT IS REQUESTING THEY BE SENT TO [facility].

## 2021-12-13 NOTE — PROGRESS NOTES
Chief Complaint  Thrush (Started 12/11/21)    Subjective      History of Present Illness  Annetta Malagon presents to [facility]     Patient understanding that this is a telehealth visit.  I cannot perform a full physical exam, therefore something might be missed, or patient may need to come into the office for further evaluation.  Patient is understanding and consents to this type of visit.    Telephone visit due to loss of Internet capability.     Over the weekend she developed thrush with a red tongue with a white coating and it is sore to swallow, tongue is sore. Denies recent antibiotic/steroid use, she does not use inhalers. She has had this happen before.     She needs refills on the flexeril.     She needs refills on her steroid cream.     Annetta Malagon  reports that she quit smoking about 7 years ago. Her smoking use included cigarettes. She has a 96.00 pack-year smoking history. She has never used smokeless tobacco.         Allergies  Duloxetine hcl    Objective     There were no vitals filed for this visit.  There is no height or weight on file to calculate BMI.     Review of Systems    Physical Exam     She is a good historian, speaking without difficulty. Affect is pleasant.     Result Review :    The following data was reviewed by: ELADIO Grove on 12/13/2021:       Depression: At risk   • PHQ-2 Score: 5       Common labs    Common Labsle 3/24/21 3/24/21 4/30/21 11/30/21 11/30/21 11/30/21    0431 0431  1716 1716 1716   Glucose  125 (A) 104 (A)   90   BUN  12 17   18   Creatinine  0.65 1.02 (A)   1.04 (A)   eGFR Non  Am      53 (A)   Sodium  137 142   139   Potassium  3.3 (A) 3.8   4.1   Chloride  103 101   102   Calcium  8.4 (A) 9.1   8.9   Albumin   3.9   4.20   Total Bilirubin   0.28   <0.2   Alkaline Phosphatase   104   71   AST (SGOT)   19   12   ALT (SGPT)   11   8   WBC 6.06  6.80 5.87     Hemoglobin 9.7 (A)  12.8 11.7 (A)     Hematocrit  30.3 (A)  41.3 35.9     Platelets 138  286 222     Total Cholesterol     161    Total Cholesterol   175      Triglycerides   118  164 (A)    HDL Cholesterol   71 (A)  54    LDL Cholesterol    80  79    (A) Abnormal value       Comments are available for some flowsheets but are not being displayed.                           Assessment and Plan     Diagnoses and all orders for this visit:    1. Thrush of mouth and esophagus (HCC) (Primary)  -     nystatin (MYCOSTATIN) 100,000 unit/mL suspension; Swish and swallow 5 mL 4 (Four) Times a Day.  Dispense: 473 mL; Refill: 0    2. Right hip pain  Comments:  I will give her one refill as courtesy, this was prescribed by ortho  Orders:  -     cyclobenzaprine (FLEXERIL) 10 MG tablet; Take 1 tablet by mouth 3 (Three) Times a Day As Needed for Muscle Spasms.  Dispense: 90 tablet; Refill: 0    3. Dermatitis  -     triamcinolone (KENALOG) 0.1 % cream; Apply  topically to the appropriate area as directed 2 (Two) Times a Day.  Dispense: 28 g; Refill: 0        I spent 18 minutes caring for Annetta on this date of service. This time includes time spent by me in the following activities:reviewing tests, obtaining and/or reviewing a separately obtained history, counseling and educating the patient/family/caregiver, ordering medications, tests, or procedures, documenting information in the medical record and care coordination    Follow Up     Return if symptoms worsen or fail to improve.    Patient was given instructions and counseling regarding her condition or for health maintenance advice. Please see specific information pulled into the AVS if appropriate.     ELADIO Grove

## 2021-12-14 NOTE — OUTREACH NOTE
Ambulatory Case Management Note    CCM Interim Update    Patient calls to report the medicines she had from yesterday were sent to [facility] and not [facility] mail order. She is also unsure what the lotion or cream she got is for because she needed the Diclofenac gel and was given Kenalog. I reviewed her chart and explained that the lotion (Ammonium Lactate) is for dermatitis and [facility] sent request for it to [facility] today. The Kenalog is a topical steroid and per office visit notes she requested a refill on her steroid cream yesterday. Her Diclofenac is like a NSAID ointment for use for pain like joint or arthritis pain. She checked her medicines and she has the Diclofenac and has refills on it. That is what she really needed. I also was going to update her pharmacy to [facility] but she said not to do that as she was changing insurances beginning of year so no need. She reports she is now down to 144 pounds and lost a lot of water weight. She had a good visit with her friend and she still talks with him several times a day.         Care Plan: Hypertension   Updates made since 12/14/2021 12:00 AM      Problem: BLOOD PRESSURE MONITORING Resolved 12/14/2021   Note:    Per patient call her blood pressures are remaining great and she is loosing more weight         Sahara Zarate RN  Ambulatory Case Management    12/14/2021, 15:40 EST

## 2021-12-14 NOTE — TELEPHONE ENCOUNTER
Pharmacy Name: [facility] - [facility] [address] - [phone] [facility] [phone] FX  [facility] - [place] - [address] [facility] [phone]  - [phone]     Pharmacy representative name: ADEN    Pharmacy representative phone number: [phone]    What medication are you calling in regards to: ammonium lactate (AMLACTIN) 12 % cream    What question does the pharmacy have: THE PHARMACIST STATED THIS MEDICATION WAS MISSING DOSING AND FREQUENCY INFORMATION AND [facility] WILL NEED CLARIFICATION ASAP    Who is the provider that prescribed the medication: EMILIANA HERNANDEZ

## 2021-12-21 NOTE — OUTREACH NOTE
[facility] End of Month Documentation    This Chronic Medical Management Care Plan for Annetta Malagon, 66 y.o. female, has been reviewed; complete and a new plan of care implemented for the month of December.  A cumulative time of 24  minutes was spent on this patient record this month, including face to face with provider; phone call with patient; chart review.    Regarding the patient's problems: has Right hip pain; Aftercare following surgery of the musculoskeletal system; Age related osteoporosis; Anxiety; Arthritis of right hip; Bilateral carpal tunnel syndrome; Bipolar disorder (HCC); Bladder disorder; Chronic migraine; Broken bones; Congestive heart failure (HCC); Degeneration of lumbosacral intervertebral disc; Drug-induced constipation; Chronic pain; Gastroesophageal reflux disease without esophagitis; Hypertension; Hemorrhoids; Hypothyroid; Lumbar spondylosis; Lumbosacral spondylosis without myelopathy; Major depressive disorder; Mixed hyperlipidemia; Osteoarthritis; Neuropathy; Osteoarthritis of hip; Primary insomnia; Varicose veins of both lower extremities; Vitamin D deficiency; and History of hip replacement on their problem list., the following items were addressed: medical records; medications and any changes can be found within the plan section of the note.  A detailed listing of time spent for chronic care management is tracked within each outreach encounter.  Current medications include:  has a current medication list which includes the following prescription(s): acetaminophen, ammonium lactate, aripiprazole, aspirin, vitamin d3, cyclobenzaprine, diclofenac sodium, escitalopram, fluticasone, furosemide, gabapentin, linaclotide, meloxicam, nystatin, oxycodone, oxycodone-acetaminophen, phentermine, rosuvastatin, topiramate, trazodone, and triamcinolone. and the patient is reported to be patient is compliant with medication protocol,  Medications are reported to be effective, refills needed this month and  telephone visit for thrush with new medications.        The patient was monitored remotely for blood pressure; weight, reports blood pressure is great and she has lost another 10 pounds and reports she is now down to 144 pounds.    The patient's physical needs include:  needs met.     The patient's mental support needs include:  continued support, CCM monthly support as needed    The patient's cognitive support needs include:  medication, reviewed all creams and topical treatments and what for and how to use    The patient's psychosocial support needs include:  medication management or adherence, reinforced use of different topicals and what they are for, see notes    The patient's functional needs include: needs met    The patient's environmental needs include:  none    Care Plan overall comments:  reviewed    Refer to previous outreach notes for more information on the areas listed above.    Monthly Billing Diagnoses  (F32.2) Major depressive disorder, severe (HCC)    (F41.9) Anxiety    (I10) Hypertension, unspecified type    Medications   · Medications have been reconciled    Care Plan progress this month:      Recently Modified Care Plans Updates made since 11/20/2021 12:00 AM     Hypertension         Problem Priority Last Modified     BLOOD PRESSURE MONITORING --  12/14/2021  3:40 PM by Sahara Zarate RN     Per patient call her blood pressures are remaining great and she is loosing more weight             Goal Recent Progress Last Modified     Establish Plan for Regular Lab Work --  11/24/2021  1:41 PM by Sahara Zarate RN              Task Due Date Last Modified     Review the patients last urine protein. Discuss need if patient has not had one. --  11/24/2021  1:41 PM by Sahara Zarate RN     Per PCP not needed 11/23/21                       Instructions   · Patient was provided an electronic copy of care plan  · CCM services were explained and offered and patient has accepted these services.  · Patient  has given their written consent to receive CCM services and understands that this includes the authorization of electronic communication of medical information with the other treating providers.  · Patient understands that they may stop CCM services at any time and these changes will be effective at the end of the calendar month and will effectively revocate the agreement of CCM services.  · Patient understands that only one practitioner can furnish and be paid for CCM services during one calendar month.  Patient also understands that there may be co-payment or deductible fees in association with CCM services.  · Patient will continue with at least monthly follow-up calls with the Nurse Navigator.    Sahara Zarate RN  Ambulatory Case Management    12/21/2021, 14:31 EST

## 2022-01-01 ENCOUNTER — APPOINTMENT (OUTPATIENT)
Dept: RADIATION ONCOLOGY | Facility: HOSPITAL | Age: 67
End: 2022-01-01

## 2022-01-01 ENCOUNTER — HOSPITAL ENCOUNTER (OUTPATIENT)
Facility: HOSPITAL | Age: 67
Setting detail: HOSPITAL OUTPATIENT SURGERY
Discharge: HOME | End: 2022-11-16
Attending: INTERNAL MEDICINE | Admitting: INTERNAL MEDICINE

## 2022-01-01 ENCOUNTER — HOSPITAL ENCOUNTER (OUTPATIENT)
Dept: CT IMAGING | Facility: HOSPITAL | Age: 67
Discharge: HOME | End: 2022-11-07
Admitting: INTERNAL MEDICINE

## 2022-01-01 ENCOUNTER — ANESTHESIA (OUTPATIENT)
Dept: GASTROENTEROLOGY | Facility: HOSPITAL | Age: 67
End: 2022-01-01

## 2022-01-01 ENCOUNTER — TRANSCRIBE ORDERS (OUTPATIENT)
Dept: ADMINISTRATIVE | Facility: HOSPITAL | Age: 67
End: 2022-01-01

## 2022-01-01 ENCOUNTER — APPOINTMENT (OUTPATIENT)
Dept: GENERAL RADIOLOGY | Facility: HOSPITAL | Age: 67
End: 2022-01-01

## 2022-01-01 ENCOUNTER — HOSPITAL ENCOUNTER (OUTPATIENT)
Dept: CARDIOLOGY | Facility: HOSPITAL | Age: 67
Discharge: HOME | End: 2022-08-09
Admitting: INTERNAL MEDICINE

## 2022-01-01 ENCOUNTER — HOSPITAL ENCOUNTER (OUTPATIENT)
Dept: MAMMOGRAPHY | Facility: HOSPITAL | Age: 67
Discharge: HOME | End: 2022-03-16
Admitting: NURSE PRACTITIONER

## 2022-01-01 ENCOUNTER — APPOINTMENT (OUTPATIENT)
Dept: CT IMAGING | Facility: HOSPITAL | Age: 67
End: 2022-01-01

## 2022-01-01 ENCOUNTER — HOSPITAL ENCOUNTER (INPATIENT)
Facility: HOSPITAL | Age: 67
LOS: 3 days | Discharge: HOME | End: 2022-11-25
Attending: EMERGENCY MEDICINE | Admitting: INTERNAL MEDICINE

## 2022-01-01 ENCOUNTER — ANESTHESIA EVENT (OUTPATIENT)
Dept: GASTROENTEROLOGY | Facility: HOSPITAL | Age: 67
End: 2022-01-01

## 2022-01-01 ENCOUNTER — PATIENT OUTREACH (OUTPATIENT)
Dept: CASE MANAGEMENT | Facility: OTHER | Age: 67
End: 2022-01-01

## 2022-01-01 ENCOUNTER — READMISSION MANAGEMENT (OUTPATIENT)
Dept: CALL CENTER | Facility: HOSPITAL | Age: 67
End: 2022-01-01

## 2022-01-01 ENCOUNTER — APPOINTMENT (OUTPATIENT)
Dept: MAMMOGRAPHY | Facility: HOSPITAL | Age: 67
End: 2022-01-01

## 2022-01-01 ENCOUNTER — APPOINTMENT (OUTPATIENT)
Dept: MRI IMAGING | Facility: HOSPITAL | Age: 67
End: 2022-01-01

## 2022-01-01 ENCOUNTER — HOSPITAL ENCOUNTER (INPATIENT)
Facility: HOSPITAL | Age: 67
LOS: 1 days | End: 2022-11-30
Attending: EMERGENCY MEDICINE | Admitting: INTERNAL MEDICINE

## 2022-01-01 ENCOUNTER — HOSPITAL ENCOUNTER (OUTPATIENT)
Dept: GENERAL RADIOLOGY | Facility: HOSPITAL | Age: 67
Discharge: HOME | End: 2022-10-27
Admitting: INTERNAL MEDICINE

## 2022-01-01 ENCOUNTER — PREP FOR SURGERY (OUTPATIENT)
Dept: OTHER | Facility: HOSPITAL | Age: 67
End: 2022-01-01

## 2022-01-01 VITALS
WEIGHT: 138.89 LBS | TEMPERATURE: 99.7 F | SYSTOLIC BLOOD PRESSURE: 60 MMHG | BODY MASS INDEX: 22.32 KG/M2 | HEIGHT: 66 IN | RESPIRATION RATE: 26 BRPM | DIASTOLIC BLOOD PRESSURE: 30 MMHG | OXYGEN SATURATION: 92 %

## 2022-01-01 VITALS
OXYGEN SATURATION: 96 % | WEIGHT: 146.39 LBS | TEMPERATURE: 97.2 F | HEART RATE: 83 BPM | SYSTOLIC BLOOD PRESSURE: 113 MMHG | BODY MASS INDEX: 23.63 KG/M2 | DIASTOLIC BLOOD PRESSURE: 69 MMHG | RESPIRATION RATE: 22 BRPM

## 2022-01-01 VITALS
HEART RATE: 94 BPM | DIASTOLIC BLOOD PRESSURE: 74 MMHG | BODY MASS INDEX: 22.32 KG/M2 | WEIGHT: 138.89 LBS | RESPIRATION RATE: 20 BRPM | SYSTOLIC BLOOD PRESSURE: 128 MMHG | OXYGEN SATURATION: 93 % | HEIGHT: 66 IN | TEMPERATURE: 98.2 F

## 2022-01-01 DIAGNOSIS — J96.11 CHRONIC RESPIRATORY FAILURE WITH HYPOXIA: ICD-10-CM

## 2022-01-01 DIAGNOSIS — J44.9 CHRONIC OBSTRUCTIVE PULMONARY DISEASE, UNSPECIFIED COPD TYPE: ICD-10-CM

## 2022-01-01 DIAGNOSIS — J18.9 PNEUMONIA OF RIGHT UPPER LOBE DUE TO INFECTIOUS ORGANISM: ICD-10-CM

## 2022-01-01 DIAGNOSIS — R91.8 MASS OF LOWER LOBE OF RIGHT LUNG: ICD-10-CM

## 2022-01-01 DIAGNOSIS — I10 HYPERTENSION, UNSPECIFIED TYPE: ICD-10-CM

## 2022-01-01 DIAGNOSIS — Z85.118 HISTORY OF LUNG CANCER: ICD-10-CM

## 2022-01-01 DIAGNOSIS — R13.14 DYSPHAGIA, PHARYNGOESOPHAGEAL PHASE: ICD-10-CM

## 2022-01-01 DIAGNOSIS — I26.99 ACUTE PULMONARY EMBOLISM, UNSPECIFIED PULMONARY EMBOLISM TYPE, UNSPECIFIED WHETHER ACUTE COR PULMONALE PRESENT: Primary | ICD-10-CM

## 2022-01-01 DIAGNOSIS — I46.9 CARDIAC ARREST: ICD-10-CM

## 2022-01-01 DIAGNOSIS — Z12.31 ENCOUNTER FOR SCREENING MAMMOGRAM FOR MALIGNANT NEOPLASM OF BREAST: ICD-10-CM

## 2022-01-01 DIAGNOSIS — F41.9 ANXIETY: ICD-10-CM

## 2022-01-01 DIAGNOSIS — R06.02 SHORTNESS OF BREATH: ICD-10-CM

## 2022-01-01 DIAGNOSIS — G44.52 NEW DAILY PERSISTENT HEADACHE: Primary | ICD-10-CM

## 2022-01-01 DIAGNOSIS — F32.2 MAJOR DEPRESSIVE DISORDER, SEVERE: Primary | ICD-10-CM

## 2022-01-01 DIAGNOSIS — I50.9 CHRONIC CONGESTIVE HEART FAILURE, UNSPECIFIED HEART FAILURE TYPE: ICD-10-CM

## 2022-01-01 DIAGNOSIS — K59.03 CONSTIPATION DUE TO OPIOID THERAPY: ICD-10-CM

## 2022-01-01 DIAGNOSIS — C34.91 MALIGNANT NEOPLASM OF RIGHT LUNG, UNSPECIFIED PART OF LUNG: ICD-10-CM

## 2022-01-01 DIAGNOSIS — R91.8 MASS OF LOWER LOBE OF RIGHT LUNG: Primary | ICD-10-CM

## 2022-01-01 DIAGNOSIS — J18.9 PNEUMONIA OF LEFT LOWER LOBE DUE TO INFECTIOUS ORGANISM: ICD-10-CM

## 2022-01-01 DIAGNOSIS — R06.02 SOB (SHORTNESS OF BREATH): ICD-10-CM

## 2022-01-01 DIAGNOSIS — I73.00 PRIMARY RAYNAUD'S PHENOMENON: ICD-10-CM

## 2022-01-01 DIAGNOSIS — R13.14 DYSPHAGIA, PHARYNGOESOPHAGEAL PHASE: Primary | ICD-10-CM

## 2022-01-01 DIAGNOSIS — R13.14 DYSPHAGIA, PHARYNGOESOPHAGEAL: ICD-10-CM

## 2022-01-01 DIAGNOSIS — R77.8 ELEVATED TROPONIN: ICD-10-CM

## 2022-01-01 DIAGNOSIS — C34.91 PRIMARY LUNG CANCER, RIGHT: Primary | Chronic | ICD-10-CM

## 2022-01-01 DIAGNOSIS — R13.14 DYSPHAGIA, PHARYNGOESOPHAGEAL: Primary | ICD-10-CM

## 2022-01-01 DIAGNOSIS — R79.89 ELEVATED BRAIN NATRIURETIC PEPTIDE (BNP) LEVEL: ICD-10-CM

## 2022-01-01 DIAGNOSIS — I73.00 PRIMARY RAYNAUD'S PHENOMENON: Primary | ICD-10-CM

## 2022-01-01 DIAGNOSIS — R09.2 RESPIRATORY ARREST: ICD-10-CM

## 2022-01-01 DIAGNOSIS — T40.2X5A CONSTIPATION DUE TO OPIOID THERAPY: ICD-10-CM

## 2022-01-01 LAB
ACB CMPLX DNA BAL NAA+NON-PRB-NCNCRNG: NOT DETECTED
ALBUMIN SERPL-MCNC: 3.4 G/DL (ref 3.5–5.2)
ALBUMIN SERPL-MCNC: 3.7 G/DL (ref 3.5–5.2)
ALBUMIN SERPL-MCNC: 4 G/DL (ref 3.5–5.2)
ALBUMIN/GLOB SERPL: 0.9 G/DL
ALBUMIN/GLOB SERPL: 0.9 G/DL
ALBUMIN/GLOB SERPL: 1.1 G/DL
ALP SERPL-CCNC: 100 U/L (ref 39–117)
ALP SERPL-CCNC: 115 U/L (ref 39–117)
ALP SERPL-CCNC: 217 U/L (ref 39–117)
ALT SERPL W P-5'-P-CCNC: 53 U/L (ref 1–33)
ALT SERPL W P-5'-P-CCNC: 6 U/L (ref 1–33)
ALT SERPL W P-5'-P-CCNC: 9 U/L (ref 1–33)
ANION GAP SERPL CALCULATED.3IONS-SCNC: 10.8 MMOL/L (ref 5–15)
ANION GAP SERPL CALCULATED.3IONS-SCNC: 12.5 MMOL/L (ref 5–15)
ANION GAP SERPL CALCULATED.3IONS-SCNC: 21.8 MMOL/L (ref 5–15)
APTT PPP: 33.2 SECONDS (ref 24.2–34.2)
AST SERPL-CCNC: 10 U/L (ref 1–32)
AST SERPL-CCNC: 14 U/L (ref 1–32)
AST SERPL-CCNC: 47 U/L (ref 1–32)
BACTERIA SPEC AEROBE CULT: NORMAL
BACTERIA SPEC RESP CULT: NORMAL
BACTERIA UR QL AUTO: ABNORMAL /HPF
BASE EXCESS BLDA CALC-SCNC: -0.5 MMOL/L (ref -2–2)
BASE EXCESS BLDA CALC-SCNC: -5.6 MMOL/L (ref -2–2)
BASOPHILS # BLD AUTO: 0.01 10*3/MM3 (ref 0–0.2)
BASOPHILS # BLD AUTO: 0.05 10*3/MM3 (ref 0–0.2)
BASOPHILS NFR BLD AUTO: 0.1 % (ref 0–1.5)
BASOPHILS NFR BLD AUTO: 0.5 % (ref 0–1.5)
BDY SITE: ABNORMAL
BDY SITE: ABNORMAL
BH CV LOWER ARTERIAL LEFT ABI RATIO: 1.07
BH CV LOWER ARTERIAL LEFT DORSALIS PEDIS SYS MAX: 127
BH CV LOWER ARTERIAL LEFT GREAT TOE SYS MAX: 95
BH CV LOWER ARTERIAL LEFT LOW THIGH SYS MAX: 124
BH CV LOWER ARTERIAL LEFT POPLITEAL SYS MAX: 141
BH CV LOWER ARTERIAL LEFT POST TIBIAL SYS MAX: 135
BH CV LOWER ARTERIAL LEFT TBI RATIO: 0.75
BH CV LOWER ARTERIAL RIGHT ABI RATIO: 1.02
BH CV LOWER ARTERIAL RIGHT DORSALIS PEDIS SYS MAX: 116
BH CV LOWER ARTERIAL RIGHT GREAT TOE SYS MAX: 112
BH CV LOWER ARTERIAL RIGHT LOW THIGH SYS MAX: 126
BH CV LOWER ARTERIAL RIGHT POPLITEAL SYS MAX: 137
BH CV LOWER ARTERIAL RIGHT POST TIBIAL SYS MAX: 129
BH CV LOWER ARTERIAL RIGHT TBI RATIO: 0.89
BILIRUB SERPL-MCNC: 0.2 MG/DL (ref 0–1.2)
BILIRUB SERPL-MCNC: 0.3 MG/DL (ref 0–1.2)
BILIRUB SERPL-MCNC: 0.3 MG/DL (ref 0–1.2)
BILIRUB UR QL STRIP: NEGATIVE
BLACTX-M ISLT/SPM QL: NORMAL
BLAIMP ISLT/SPM QL: NORMAL
BLAKPC ISLT/SPM QL: NORMAL
BLAOXA-48-LIKE ISLT/SPM QL: NORMAL
BLAVIM ISLT/SPM QL: NORMAL
BUN SERPL-MCNC: 13 MG/DL (ref 8–23)
BUN SERPL-MCNC: 14 MG/DL (ref 8–23)
BUN SERPL-MCNC: 29 MG/DL (ref 8–23)
BUN/CREAT SERPL: 17.8 (ref 7–25)
BUN/CREAT SERPL: 23.7 (ref 7–25)
BUN/CREAT SERPL: 25.7 (ref 7–25)
BURR CELLS BLD QL SMEAR: ABNORMAL
C PNEUM DNA NPH QL NAA+NON-PROBE: NOT DETECTED
CALCIUM SPEC-SCNC: 10.1 MG/DL (ref 8.6–10.5)
CALCIUM SPEC-SCNC: 8.8 MG/DL (ref 8.6–10.5)
CALCIUM SPEC-SCNC: 9.2 MG/DL (ref 8.6–10.5)
CHLORIDE SERPL-SCNC: 100 MMOL/L (ref 98–107)
CHLORIDE SERPL-SCNC: 101 MMOL/L (ref 98–107)
CHLORIDE SERPL-SCNC: 93 MMOL/L (ref 98–107)
CLARITY UR: ABNORMAL
CMV DNA SPEC QL NAA+PROBE: NEGATIVE
CO2 SERPL-SCNC: 24.2 MMOL/L (ref 22–29)
CO2 SERPL-SCNC: 25.2 MMOL/L (ref 22–29)
CO2 SERPL-SCNC: 26.5 MMOL/L (ref 22–29)
COD CRY URNS QL: ABNORMAL /HPF
COHGB MFR BLD: 0.7 % (ref 0–1.5)
COHGB MFR BLD: 0.8 % (ref 0–1.5)
COLOR UR: YELLOW
CREAT BLDA-MCNC: 0.8 MG/DL
CREAT SERPL-MCNC: 0.59 MG/DL (ref 0.57–1)
CREAT SERPL-MCNC: 0.73 MG/DL (ref 0.57–1)
CREAT SERPL-MCNC: 1.13 MG/DL (ref 0.57–1)
CYTO UR: NORMAL
CYTO UR: NORMAL
D-LACTATE SERPL-SCNC: 1.8 MMOL/L (ref 0.5–2)
D-LACTATE SERPL-SCNC: 2.3 MMOL/L (ref 0.5–2)
D-LACTATE SERPL-SCNC: 4.5 MMOL/L (ref 0.5–2)
DEPRECATED RDW RBC AUTO: 47.9 FL (ref 37–54)
DEPRECATED RDW RBC AUTO: 48.6 FL (ref 37–54)
DEPRECATED RDW RBC AUTO: 49.3 FL (ref 37–54)
E CLOAC COMP DNA BAL NAA+NON-PRB-NCNCRNG: NOT DETECTED
E COLI DNA BAL NAA+NON-PRB-NCNCRNG: NOT DETECTED
EGFRCR SERPLBLD CKD-EPI 2021: 53.8 ML/MIN/1.73
EGFRCR SERPLBLD CKD-EPI 2021: 81.4 ML/MIN/1.73
EGFRCR SERPLBLD CKD-EPI 2021: 90.8 ML/MIN/1.73
EGFRCR SERPLBLD CKD-EPI 2021: 99.5 ML/MIN/1.73
EOSINOPHIL # BLD AUTO: 0 10*3/MM3 (ref 0–0.4)
EOSINOPHIL # BLD AUTO: 0.05 10*3/MM3 (ref 0–0.4)
EOSINOPHIL NFR BLD AUTO: 0 % (ref 0.3–6.2)
EOSINOPHIL NFR BLD AUTO: 0.5 % (ref 0.3–6.2)
ERYTHROCYTE [DISTWIDTH] IN BLOOD BY AUTOMATED COUNT: 14.4 % (ref 12.3–15.4)
ERYTHROCYTE [DISTWIDTH] IN BLOOD BY AUTOMATED COUNT: 14.6 % (ref 12.3–15.4)
ERYTHROCYTE [DISTWIDTH] IN BLOOD BY AUTOMATED COUNT: 14.6 % (ref 12.3–15.4)
FHHB: 14.7 % (ref 0–5)
FHHB: 3.2 % (ref 0–5)
FLUAV AG NPH QL: NEGATIVE
FLUAV AG NPH QL: NEGATIVE
FLUAV SUBTYP SPEC NAA+PROBE: NOT DETECTED
FLUBV AG NPH QL IA: NEGATIVE
FLUBV AG NPH QL IA: NEGATIVE
FLUBV RNA ISLT QL NAA+PROBE: NOT DETECTED
GAS FLOW AIRWAY: 2 LPM
GAS FLOW AIRWAY: 5 LPM
GLOBULIN UR ELPH-MCNC: 3.7 GM/DL
GLOBULIN UR ELPH-MCNC: 3.8 GM/DL
GLOBULIN UR ELPH-MCNC: 4 GM/DL
GLUCOSE BLDC GLUCOMTR-MCNC: 180 MG/DL (ref 70–99)
GLUCOSE BLDC GLUCOMTR-MCNC: 83 MG/DL (ref 70–99)
GLUCOSE BLDC GLUCOMTR-MCNC: 99 MG/DL (ref 70–99)
GLUCOSE SERPL-MCNC: 134 MG/DL (ref 65–99)
GLUCOSE SERPL-MCNC: 145 MG/DL (ref 65–99)
GLUCOSE SERPL-MCNC: 149 MG/DL (ref 65–99)
GLUCOSE UR STRIP-MCNC: NEGATIVE MG/DL
GP B STREP DNA BAL NAA+NON-PRB-NCNCRNG: NOT DETECTED
GRAM STN SPEC: NORMAL
HADV DNA SPEC NAA+PROBE: NOT DETECTED
HAEM INFLU DNA BAL NAA+NON-PRB-NCNCRNG: NOT DETECTED
HCO3 BLDA-SCNC: 19.3 MMOL/L (ref 22–26)
HCO3 BLDA-SCNC: 23.1 MMOL/L (ref 22–26)
HCOV RNA LOWER RESP QL NAA+NON-PROBE: NOT DETECTED
HCT VFR BLD AUTO: 35.7 % (ref 34–46.6)
HCT VFR BLD AUTO: 39.9 % (ref 34–46.6)
HCT VFR BLD AUTO: 40.7 % (ref 34–46.6)
HGB BLD-MCNC: 12.1 G/DL (ref 12–15.9)
HGB BLD-MCNC: 13.1 G/DL (ref 12–15.9)
HGB BLD-MCNC: 13.4 G/DL (ref 12–15.9)
HGB BLDA-MCNC: 13.5 G/DL (ref 11.7–14.6)
HGB BLDA-MCNC: 14.1 G/DL (ref 11.7–14.6)
HGB UR QL STRIP.AUTO: NEGATIVE
HMPV RNA NPH QL NAA+NON-PROBE: NOT DETECTED
HOLD SPECIMEN: NORMAL
HPIV RNA LOWER RESP QL NAA+NON-PROBE: NOT DETECTED
HYALINE CASTS UR QL AUTO: ABNORMAL /LPF
IMM GRANULOCYTES # BLD AUTO: 0.04 10*3/MM3 (ref 0–0.05)
IMM GRANULOCYTES # BLD AUTO: 0.04 10*3/MM3 (ref 0–0.05)
IMM GRANULOCYTES NFR BLD AUTO: 0.4 % (ref 0–0.5)
IMM GRANULOCYTES NFR BLD AUTO: 0.4 % (ref 0–0.5)
INHALED O2 CONCENTRATION: 28 %
INHALED O2 CONCENTRATION: 40 %
INR PPP: 1.27 (ref 0.86–1.15)
K AEROGENES DNA BAL NAA+NON-PRB-NCNCRNG: NOT DETECTED
K OXYTOCA DNA BAL NAA+NON-PRB-NCNCRNG: NOT DETECTED
K PNEU GRP DNA BAL NAA+NON-PRB-NCNCRNG: NOT DETECTED
KETONES UR QL STRIP: NEGATIVE
L PNEUMO DNA LOWER RESP QL NAA+NON-PROBE: NOT DETECTED
LAB AP CASE REPORT: NORMAL
LAB AP CASE REPORT: NORMAL
LAB AP CLINICAL INFORMATION: NORMAL
LAB AP CLINICAL INFORMATION: NORMAL
LAB AP DIAGNOSIS COMMENT: NORMAL
LAB AP SPECIAL STAINS: NORMAL
LACTATE BLDA-SCNC: ABNORMAL MMOL/L
LACTATE BLDA-SCNC: ABNORMAL MMOL/L
LEUKOCYTE ESTERASE UR QL STRIP.AUTO: ABNORMAL
LIPASE SERPL-CCNC: 6 U/L (ref 13–60)
LYMPHOCYTES # BLD AUTO: 1.1 10*3/MM3 (ref 0.7–3.1)
LYMPHOCYTES # BLD AUTO: 2.05 10*3/MM3 (ref 0.7–3.1)
LYMPHOCYTES # BLD MANUAL: 1.19 10*3/MM3 (ref 0.7–3.1)
LYMPHOCYTES NFR BLD AUTO: 11.7 % (ref 19.6–45.3)
LYMPHOCYTES NFR BLD AUTO: 18.9 % (ref 19.6–45.3)
LYMPHOCYTES NFR BLD MANUAL: 3 % (ref 5–12)
M CATARRHALIS DNA BAL NAA+NON-PRB-NCNCRNG: NOT DETECTED
M PNEUMO IGG SER IA-ACNC: NOT DETECTED
MAGNESIUM SERPL-MCNC: 2 MG/DL (ref 1.6–2.4)
MAXIMAL PREDICTED HEART RATE: 154 BPM
MCH RBC QN AUTO: 29.8 PG (ref 26.6–33)
MCH RBC QN AUTO: 29.8 PG (ref 26.6–33)
MCH RBC QN AUTO: 30.4 PG (ref 26.6–33)
MCHC RBC AUTO-ENTMCNC: 32.8 G/DL (ref 31.5–35.7)
MCHC RBC AUTO-ENTMCNC: 32.9 G/DL (ref 31.5–35.7)
MCHC RBC AUTO-ENTMCNC: 33.9 G/DL (ref 31.5–35.7)
MCV RBC AUTO: 89.7 FL (ref 79–97)
MCV RBC AUTO: 90.6 FL (ref 79–97)
MCV RBC AUTO: 90.9 FL (ref 79–97)
MECA+MECC ISLT/SPM QL: NORMAL
METAMYELOCYTES NFR BLD MANUAL: 2 % (ref 0–0)
METHGB BLD QL: 0 % (ref 0–1.5)
METHGB BLD QL: 0.2 % (ref 0–1.5)
MODALITY: ABNORMAL
MODALITY: ABNORMAL
MONOCYTES # BLD AUTO: 0.36 10*3/MM3 (ref 0.1–0.9)
MONOCYTES # BLD AUTO: 0.45 10*3/MM3 (ref 0.1–0.9)
MONOCYTES # BLD: 0.51 10*3/MM3 (ref 0.1–0.9)
MONOCYTES NFR BLD AUTO: 3.8 % (ref 5–12)
MONOCYTES NFR BLD AUTO: 4.1 % (ref 5–12)
MUCOUS THREADS URNS QL MICRO: ABNORMAL /HPF
MYELOCYTES NFR BLD MANUAL: 1 % (ref 0–0)
NDM GENE: NORMAL
NEUTROPHILS # BLD AUTO: 14.76 10*3/MM3 (ref 1.7–7)
NEUTROPHILS NFR BLD AUTO: 7.87 10*3/MM3 (ref 1.7–7)
NEUTROPHILS NFR BLD AUTO: 75.6 % (ref 42.7–76)
NEUTROPHILS NFR BLD AUTO: 8.23 10*3/MM3 (ref 1.7–7)
NEUTROPHILS NFR BLD AUTO: 84 % (ref 42.7–76)
NEUTROPHILS NFR BLD MANUAL: 86 % (ref 42.7–76)
NEUTS BAND NFR BLD MANUAL: 1 % (ref 0–5)
NIGHT BLUE STAIN TISS: NORMAL
NITRITE UR QL STRIP: POSITIVE
NRBC BLD AUTO-RTO: 0 /100 WBC (ref 0–0.2)
NRBC BLD AUTO-RTO: 0 /100 WBC (ref 0–0.2)
NT-PROBNP SERPL-MCNC: 565.3 PG/ML (ref 0–900)
NT-PROBNP SERPL-MCNC: ABNORMAL PG/ML (ref 0–900)
OXYHGB MFR BLDV: 84.6 % (ref 94–99)
OXYHGB MFR BLDV: 95.8 % (ref 94–99)
P AERUGINOSA DNA BAL NAA+NON-PRB-NCNCRNG: NOT DETECTED
PATH REPORT.ADDENDUM SPEC: NORMAL
PATH REPORT.FINAL DX SPEC: NORMAL
PATH REPORT.FINAL DX SPEC: NORMAL
PATH REPORT.GROSS SPEC: NORMAL
PATH REPORT.GROSS SPEC: NORMAL
PCO2 BLDA: 34.9 MM HG (ref 35–45)
PCO2 BLDA: 35.7 MM HG (ref 35–45)
PH BLDA: 7.35 PH UNITS (ref 7.35–7.45)
PH BLDA: 7.44 PH UNITS (ref 7.35–7.45)
PH UR STRIP.AUTO: 6.5 [PH] (ref 5–8)
PLAT MORPH BLD: NORMAL
PLATELET # BLD AUTO: 268 10*3/MM3 (ref 140–450)
PLATELET # BLD AUTO: 282 10*3/MM3 (ref 140–450)
PLATELET # BLD AUTO: 306 10*3/MM3 (ref 140–450)
PMV BLD AUTO: 8.8 FL (ref 6–12)
PMV BLD AUTO: 9.3 FL (ref 6–12)
PMV BLD AUTO: 9.4 FL (ref 6–12)
PO2 BLD: 139 MM[HG] (ref 0–500)
PO2 BLD: 303 MM[HG] (ref 0–500)
PO2 BLDA: 55.6 MM HG (ref 80–100)
PO2 BLDA: 84.9 MM HG (ref 80–100)
POTASSIUM SERPL-SCNC: 3.4 MMOL/L (ref 3.5–5.2)
POTASSIUM SERPL-SCNC: 3.7 MMOL/L (ref 3.5–5.2)
POTASSIUM SERPL-SCNC: 4.2 MMOL/L (ref 3.5–5.2)
PROT SERPL-MCNC: 7.1 G/DL (ref 6–8.5)
PROT SERPL-MCNC: 7.7 G/DL (ref 6–8.5)
PROT SERPL-MCNC: 7.8 G/DL (ref 6–8.5)
PROT UR QL STRIP: ABNORMAL
PROTEUS SP DNA BAL NAA+NON-PRB-NCNCRNG: NOT DETECTED
PROTHROMBIN TIME: 16.1 SECONDS (ref 11.8–14.9)
QT INTERVAL: 337 MS
QT INTERVAL: 367 MS
QT INTERVAL: 371 MS
QT INTERVAL: 399 MS
RBC # BLD AUTO: 3.98 10*6/MM3 (ref 3.77–5.28)
RBC # BLD AUTO: 4.39 10*6/MM3 (ref 3.77–5.28)
RBC # BLD AUTO: 4.49 10*6/MM3 (ref 3.77–5.28)
RBC # UR STRIP: ABNORMAL /HPF
REF LAB TEST METHOD: ABNORMAL
RHINOVIRUS RNA SPEC NAA+PROBE: NOT DETECTED
RSV RNA NPH QL NAA+NON-PROBE: NOT DETECTED
S AUREUS DNA BAL NAA+NON-PRB-NCNCRNG: NOT DETECTED
S MARCESCENS DNA BAL NAA+NON-PRB-NCNCRNG: NOT DETECTED
S PNEUM DNA BAL NAA+NON-PRB-NCNCRNG: NOT DETECTED
S PYO DNA BAL NAA+NON-PRB-NCNCRNG: NOT DETECTED
SAO2 % BLDCOA: 85.2 % (ref 95–99)
SAO2 % BLDCOA: 96.8 % (ref 95–99)
SARS-COV-2 RNA PNL SPEC NAA+PROBE: NOT DETECTED
SARS-COV-2 RNA PNL SPEC NAA+PROBE: NOT DETECTED
SCAN SLIDE: NORMAL
SODIUM SERPL-SCNC: 136 MMOL/L (ref 136–145)
SODIUM SERPL-SCNC: 139 MMOL/L (ref 136–145)
SODIUM SERPL-SCNC: 140 MMOL/L (ref 136–145)
SP GR UR STRIP: 1.02 (ref 1–1.03)
SPECIMEN SOURCE: NORMAL
SQUAMOUS #/AREA URNS HPF: ABNORMAL /HPF
STAT OF ADQ CVX/VAG CYTO-IMP: NORMAL
STRESS TARGET HR: 131 BPM
TROPONIN T SERPL-MCNC: 0.18 NG/ML (ref 0–0.03)
UPPER ARTERIAL LEFT ARM BRACHIAL SYS MAX: 118 MMHG
UPPER ARTERIAL RIGHT ARM BRACHIAL SYS MAX: 126 MMHG
UROBILINOGEN UR QL STRIP: ABNORMAL
VARIANT LYMPHS NFR BLD MANUAL: 7 % (ref 19.6–45.3)
WBC # UR STRIP: ABNORMAL /HPF
WBC MORPH BLD: NORMAL
WBC NRBC COR # BLD: 10.87 10*3/MM3 (ref 3.4–10.8)
WBC NRBC COR # BLD: 16.96 10*3/MM3 (ref 3.4–10.8)
WBC NRBC COR # BLD: 9.38 10*3/MM3 (ref 3.4–10.8)
WHOLE BLOOD HOLD COAG: NORMAL
WHOLE BLOOD HOLD COAG: NORMAL
WHOLE BLOOD HOLD SPECIMEN: NORMAL
WHOLE BLOOD HOLD SPECIMEN: NORMAL

## 2022-01-01 PROCEDURE — 63710000001 BARIUM SULFATE 60 % SUSPENSION: Performed by: INTERNAL MEDICINE

## 2022-01-01 PROCEDURE — 88341 IMHCHEM/IMCYTCHM EA ADD ANTB: CPT | Performed by: INTERNAL MEDICINE

## 2022-01-01 PROCEDURE — 36600 WITHDRAWAL OF ARTERIAL BLOOD: CPT | Performed by: INTERNAL MEDICINE

## 2022-01-01 PROCEDURE — 93005 ELECTROCARDIOGRAM TRACING: CPT | Performed by: INTERNAL MEDICINE

## 2022-01-01 PROCEDURE — 87116 MYCOBACTERIA CULTURE: CPT | Performed by: INTERNAL MEDICINE

## 2022-01-01 PROCEDURE — 25010000002 HEPARIN (PORCINE) 25000-0.45 UT/250ML-% SOLUTION: Performed by: EMERGENCY MEDICINE

## 2022-01-01 PROCEDURE — 94799 UNLISTED PULMONARY SVC/PX: CPT

## 2022-01-01 PROCEDURE — 0 IOPAMIDOL PER 1 ML: Performed by: INTERNAL MEDICINE

## 2022-01-01 PROCEDURE — 36415 COLL VENOUS BLD VENIPUNCTURE: CPT | Performed by: EMERGENCY MEDICINE

## 2022-01-01 PROCEDURE — 70553 MRI BRAIN STEM W/O & W/DYE: CPT

## 2022-01-01 PROCEDURE — 25010000002 EPINEPHRINE 1 MG/10ML SOLUTION PREFILLED SYRINGE: Performed by: EMERGENCY MEDICINE

## 2022-01-01 PROCEDURE — 87071 CULTURE AEROBIC QUANT OTHER: CPT | Performed by: INTERNAL MEDICINE

## 2022-01-01 PROCEDURE — 83880 ASSAY OF NATRIURETIC PEPTIDE: CPT

## 2022-01-01 PROCEDURE — 92950 HEART/LUNG RESUSCITATION CPR: CPT

## 2022-01-01 PROCEDURE — 87205 SMEAR GRAM STAIN: CPT | Performed by: INTERNAL MEDICINE

## 2022-01-01 PROCEDURE — 0 IOPAMIDOL PER 1 ML: Performed by: EMERGENCY MEDICINE

## 2022-01-01 PROCEDURE — 94760 N-INVAS EAR/PLS OXIMETRY 1: CPT

## 2022-01-01 PROCEDURE — 83605 ASSAY OF LACTIC ACID: CPT | Performed by: EMERGENCY MEDICINE

## 2022-01-01 PROCEDURE — 71260 CT THORAX DX C+: CPT

## 2022-01-01 PROCEDURE — 85610 PROTHROMBIN TIME: CPT | Performed by: EMERGENCY MEDICINE

## 2022-01-01 PROCEDURE — 94640 AIRWAY INHALATION TREATMENT: CPT

## 2022-01-01 PROCEDURE — 71045 X-RAY EXAM CHEST 1 VIEW: CPT

## 2022-01-01 PROCEDURE — A9270 NON-COVERED ITEM OR SERVICE: HCPCS | Performed by: INTERNAL MEDICINE

## 2022-01-01 PROCEDURE — 36600 WITHDRAWAL OF ARTERIAL BLOOD: CPT | Performed by: UROLOGY

## 2022-01-01 PROCEDURE — 82962 GLUCOSE BLOOD TEST: CPT

## 2022-01-01 PROCEDURE — 88104 CYTOPATH FL NONGYN SMEARS: CPT | Performed by: INTERNAL MEDICINE

## 2022-01-01 PROCEDURE — 70491 CT SOFT TISSUE NECK W/DYE: CPT

## 2022-01-01 PROCEDURE — 25010000002 ENOXAPARIN PER 10 MG: Performed by: INTERNAL MEDICINE

## 2022-01-01 PROCEDURE — 82805 BLOOD GASES W/O2 SATURATION: CPT | Performed by: UROLOGY

## 2022-01-01 PROCEDURE — 84484 ASSAY OF TROPONIN QUANT: CPT

## 2022-01-01 PROCEDURE — 83050 HGB METHEMOGLOBIN QUAN: CPT | Performed by: INTERNAL MEDICINE

## 2022-01-01 PROCEDURE — 99490 CHRNC CARE MGMT STAFF 1ST 20: CPT | Performed by: NURSE PRACTITIONER

## 2022-01-01 PROCEDURE — 77063 BREAST TOMOSYNTHESIS BI: CPT

## 2022-01-01 PROCEDURE — 85007 BL SMEAR W/DIFF WBC COUNT: CPT

## 2022-01-01 PROCEDURE — 82565 ASSAY OF CREATININE: CPT

## 2022-01-01 PROCEDURE — 25010000002 FUROSEMIDE PER 20 MG: Performed by: NURSE PRACTITIONER

## 2022-01-01 PROCEDURE — 63710000001 BARIUM SULFATE 98 % RECONSTITUTED SUSPENSION: Performed by: INTERNAL MEDICINE

## 2022-01-01 PROCEDURE — 63710000001 SOD BICARB-CITRIC ACID-SIMETHICONE 2.21-1.53-0.04 G PACK: Performed by: INTERNAL MEDICINE

## 2022-01-01 PROCEDURE — 25010000002 PROPOFOL 10 MG/ML EMULSION: Performed by: NURSE ANESTHETIST, CERTIFIED REGISTERED

## 2022-01-01 PROCEDURE — 92611 MOTION FLUOROSCOPY/SWALLOW: CPT

## 2022-01-01 PROCEDURE — U0004 COV-19 TEST NON-CDC HGH THRU: HCPCS | Performed by: EMERGENCY MEDICINE

## 2022-01-01 PROCEDURE — 25010000002 AMIODARONE PER 30 MG: Performed by: EMERGENCY MEDICINE

## 2022-01-01 PROCEDURE — 83880 ASSAY OF NATRIURETIC PEPTIDE: CPT | Performed by: EMERGENCY MEDICINE

## 2022-01-01 PROCEDURE — 93005 ELECTROCARDIOGRAM TRACING: CPT

## 2022-01-01 PROCEDURE — 77067 SCR MAMMO BI INCL CAD: CPT

## 2022-01-01 PROCEDURE — 80053 COMPREHEN METABOLIC PANEL: CPT | Performed by: EMERGENCY MEDICINE

## 2022-01-01 PROCEDURE — 99284 EMERGENCY DEPT VISIT MOD MDM: CPT

## 2022-01-01 PROCEDURE — 0 GADOBENATE DIMEGLUMINE 529 MG/ML SOLUTION: Performed by: INTERNAL MEDICINE

## 2022-01-01 PROCEDURE — 25010000002 METHYLPREDNISOLONE PER 125 MG: Performed by: EMERGENCY MEDICINE

## 2022-01-01 PROCEDURE — 82805 BLOOD GASES W/O2 SATURATION: CPT | Performed by: INTERNAL MEDICINE

## 2022-01-01 PROCEDURE — 63710000001 BARIUM SULFATE 700 MG TABLET: Performed by: INTERNAL MEDICINE

## 2022-01-01 PROCEDURE — 87804 INFLUENZA ASSAY W/OPTIC: CPT | Performed by: EMERGENCY MEDICINE

## 2022-01-01 PROCEDURE — 87206 SMEAR FLUORESCENT/ACID STAI: CPT | Performed by: INTERNAL MEDICINE

## 2022-01-01 PROCEDURE — 93005 ELECTROCARDIOGRAM TRACING: CPT | Performed by: EMERGENCY MEDICINE

## 2022-01-01 PROCEDURE — 94664 DEMO&/EVAL PT USE INHALER: CPT

## 2022-01-01 PROCEDURE — 25010000002 VANCOMYCIN 5 G RECONSTITUTED SOLUTION: Performed by: EMERGENCY MEDICINE

## 2022-01-01 PROCEDURE — 93010 ELECTROCARDIOGRAM REPORT: CPT | Performed by: SPECIALIST

## 2022-01-01 PROCEDURE — 93923 UPR/LXTR ART STDY 3+ LVLS: CPT | Performed by: SURGERY

## 2022-01-01 PROCEDURE — 85025 COMPLETE CBC W/AUTO DIFF WBC: CPT

## 2022-01-01 PROCEDURE — 81001 URINALYSIS AUTO W/SCOPE: CPT | Performed by: NURSE PRACTITIONER

## 2022-01-01 PROCEDURE — 87040 BLOOD CULTURE FOR BACTERIA: CPT | Performed by: EMERGENCY MEDICINE

## 2022-01-01 PROCEDURE — 5A2204Z RESTORATION OF CARDIAC RHYTHM, SINGLE: ICD-10-PCS | Performed by: EMERGENCY MEDICINE

## 2022-01-01 PROCEDURE — A9577 INJ MULTIHANCE: HCPCS | Performed by: INTERNAL MEDICINE

## 2022-01-01 PROCEDURE — 83735 ASSAY OF MAGNESIUM: CPT | Performed by: EMERGENCY MEDICINE

## 2022-01-01 PROCEDURE — 87102 FUNGUS ISOLATION CULTURE: CPT | Performed by: INTERNAL MEDICINE

## 2022-01-01 PROCEDURE — 99223 1ST HOSP IP/OBS HIGH 75: CPT | Performed by: RADIOLOGY

## 2022-01-01 PROCEDURE — 31500 INSERT EMERGENCY AIRWAY: CPT

## 2022-01-01 PROCEDURE — 74230 X-RAY XM SWLNG FUNCJ C+: CPT

## 2022-01-01 PROCEDURE — 99285 EMERGENCY DEPT VISIT HI MDM: CPT

## 2022-01-01 PROCEDURE — 25010000002 PIPERACILLIN SOD-TAZOBACTAM PER 1 G: Performed by: EMERGENCY MEDICINE

## 2022-01-01 PROCEDURE — 87070 CULTURE OTHR SPECIMN AEROBIC: CPT | Performed by: INTERNAL MEDICINE

## 2022-01-01 PROCEDURE — 25010000002 AZITHROMYCIN PER 500 MG: Performed by: EMERGENCY MEDICINE

## 2022-01-01 PROCEDURE — 83050 HGB METHEMOGLOBIN QUAN: CPT | Performed by: UROLOGY

## 2022-01-01 PROCEDURE — 80053 COMPREHEN METABOLIC PANEL: CPT

## 2022-01-01 PROCEDURE — 88305 TISSUE EXAM BY PATHOLOGIST: CPT | Performed by: INTERNAL MEDICINE

## 2022-01-01 PROCEDURE — 63710000001 BARIUM SULFATE 40 % RECONSTITUTED SUSPENSION: Performed by: INTERNAL MEDICINE

## 2022-01-01 PROCEDURE — 87496 CYTOMEG DNA AMP PROBE: CPT | Performed by: INTERNAL MEDICINE

## 2022-01-01 PROCEDURE — 83690 ASSAY OF LIPASE: CPT | Performed by: EMERGENCY MEDICINE

## 2022-01-01 PROCEDURE — 87633 RESP VIRUS 12-25 TARGETS: CPT | Performed by: INTERNAL MEDICINE

## 2022-01-01 PROCEDURE — 85025 COMPLETE CBC W/AUTO DIFF WBC: CPT | Performed by: EMERGENCY MEDICINE

## 2022-01-01 PROCEDURE — 82375 ASSAY CARBOXYHB QUANT: CPT | Performed by: UROLOGY

## 2022-01-01 PROCEDURE — 74177 CT ABD & PELVIS W/CONTRAST: CPT

## 2022-01-01 PROCEDURE — 85730 THROMBOPLASTIN TIME PARTIAL: CPT | Performed by: EMERGENCY MEDICINE

## 2022-01-01 PROCEDURE — 88108 CYTOPATH CONCENTRATE TECH: CPT | Performed by: INTERNAL MEDICINE

## 2022-01-01 PROCEDURE — 82375 ASSAY CARBOXYHB QUANT: CPT | Performed by: INTERNAL MEDICINE

## 2022-01-01 PROCEDURE — 25010000002 ATROPINE SULFATE 1 MG/10ML SOLUTION PREFILLED SYRINGE: Performed by: EMERGENCY MEDICINE

## 2022-01-01 PROCEDURE — 88342 IMHCHEM/IMCYTCHM 1ST ANTB: CPT | Performed by: INTERNAL MEDICINE

## 2022-01-01 PROCEDURE — 25010000002 CEFTRIAXONE PER 250 MG: Performed by: EMERGENCY MEDICINE

## 2022-01-01 PROCEDURE — 80053 COMPREHEN METABOLIC PANEL: CPT | Performed by: INTERNAL MEDICINE

## 2022-01-01 PROCEDURE — 93923 UPR/LXTR ART STDY 3+ LVLS: CPT

## 2022-01-01 PROCEDURE — 0BH17EZ INSERTION OF ENDOTRACHEAL AIRWAY INTO TRACHEA, VIA NATURAL OR ARTIFICIAL OPENING: ICD-10-PCS | Performed by: EMERGENCY MEDICINE

## 2022-01-01 PROCEDURE — 85025 COMPLETE CBC W/AUTO DIFF WBC: CPT | Performed by: INTERNAL MEDICINE

## 2022-01-01 PROCEDURE — 63710000001 BARIUM SULFATE 60 % CREAM: Performed by: INTERNAL MEDICINE

## 2022-01-01 PROCEDURE — 25010000002 ONDANSETRON PER 1 MG: Performed by: INTERNAL MEDICINE

## 2022-01-01 RX ORDER — BENZONATATE 100 MG/1
200 CAPSULE ORAL 3 TIMES DAILY PRN
Status: DISCONTINUED | OUTPATIENT
Start: 2022-01-01 | End: 2022-01-01 | Stop reason: HOSPADM

## 2022-01-01 RX ORDER — ASPIRIN 81 MG/1
81 TABLET, CHEWABLE ORAL DAILY
Status: DISCONTINUED | OUTPATIENT
Start: 2022-01-01 | End: 2022-01-01 | Stop reason: HOSPADM

## 2022-01-01 RX ORDER — TRAZODONE HYDROCHLORIDE 100 MG/1
100 TABLET ORAL NIGHTLY
Status: DISCONTINUED | OUTPATIENT
Start: 2022-01-01 | End: 2022-01-01 | Stop reason: HOSPADM

## 2022-01-01 RX ORDER — BENZONATATE 100 MG/1
100 CAPSULE ORAL ONCE
Status: COMPLETED | OUTPATIENT
Start: 2022-01-01 | End: 2022-01-01

## 2022-01-01 RX ORDER — GUAIFENESIN/DEXTROMETHORPHAN 100-10MG/5
5 SYRUP ORAL EVERY 4 HOURS PRN
Status: DISCONTINUED | OUTPATIENT
Start: 2022-01-01 | End: 2022-01-01 | Stop reason: HOSPADM

## 2022-01-01 RX ORDER — LIDOCAINE HYDROCHLORIDE 10 MG/ML
5 INJECTION, SOLUTION EPIDURAL; INFILTRATION; INTRACAUDAL; PERINEURAL ONCE
Status: COMPLETED | OUTPATIENT
Start: 2022-01-01 | End: 2022-01-01

## 2022-01-01 RX ORDER — METHYLPREDNISOLONE SODIUM SUCCINATE 125 MG/2ML
125 INJECTION, POWDER, LYOPHILIZED, FOR SOLUTION INTRAMUSCULAR; INTRAVENOUS ONCE
Status: COMPLETED | OUTPATIENT
Start: 2022-01-01 | End: 2022-01-01

## 2022-01-01 RX ORDER — CYCLOBENZAPRINE HCL 10 MG
10 TABLET ORAL 3 TIMES DAILY PRN
Status: DISCONTINUED | OUTPATIENT
Start: 2022-01-01 | End: 2022-01-01 | Stop reason: HOSPADM

## 2022-01-01 RX ORDER — CEFTRIAXONE SODIUM 1 G/50ML
1 INJECTION, SOLUTION INTRAVENOUS ONCE
Status: COMPLETED | OUTPATIENT
Start: 2022-01-01 | End: 2022-01-01

## 2022-01-01 RX ORDER — ACETAMINOPHEN 325 MG/1
650 TABLET ORAL EVERY 4 HOURS PRN
Status: DISCONTINUED | OUTPATIENT
Start: 2022-01-01 | End: 2022-01-01 | Stop reason: HOSPADM

## 2022-01-01 RX ORDER — METOCLOPRAMIDE 10 MG/1
5 TABLET ORAL
Status: DISCONTINUED | OUTPATIENT
Start: 2022-01-01 | End: 2022-01-01 | Stop reason: HOSPADM

## 2022-01-01 RX ORDER — ARIPIPRAZOLE 5 MG/1
5 TABLET ORAL DAILY
Status: DISCONTINUED | OUTPATIENT
Start: 2022-01-01 | End: 2022-01-01 | Stop reason: HOSPADM

## 2022-01-01 RX ORDER — AMMONIUM LACTATE 120 MG/G
CREAM TOPICAL DAILY
Status: DISCONTINUED | OUTPATIENT
Start: 2022-01-01 | End: 2022-01-01 | Stop reason: HOSPADM

## 2022-01-01 RX ORDER — SODIUM CHLORIDE 0.9 % (FLUSH) 0.9 %
10 SYRINGE (ML) INJECTION AS NEEDED
Status: DISCONTINUED | OUTPATIENT
Start: 2022-01-01 | End: 2022-01-01 | Stop reason: HOSPADM

## 2022-01-01 RX ORDER — ATROPINE SULFATE 0.1 MG/ML
INJECTION INTRAVENOUS
Status: COMPLETED | OUTPATIENT
Start: 2022-01-01 | End: 2022-01-01

## 2022-01-01 RX ORDER — ERGOCALCIFEROL 1.25 MG/1
50000 CAPSULE ORAL
Status: DISCONTINUED | OUTPATIENT
Start: 2022-01-01 | End: 2022-01-01 | Stop reason: HOSPADM

## 2022-01-01 RX ORDER — TOPIRAMATE 100 MG/1
100 TABLET, FILM COATED ORAL DAILY
Status: DISCONTINUED | OUTPATIENT
Start: 2022-01-01 | End: 2022-01-01 | Stop reason: HOSPADM

## 2022-01-01 RX ORDER — OXYCODONE AND ACETAMINOPHEN 10; 325 MG/1; MG/1
1 TABLET ORAL EVERY 6 HOURS PRN
Status: DISCONTINUED | OUTPATIENT
Start: 2022-01-01 | End: 2022-01-01 | Stop reason: HOSPADM

## 2022-01-01 RX ORDER — FUROSEMIDE 20 MG/1
20 TABLET ORAL DAILY
Status: DISCONTINUED | OUTPATIENT
Start: 2022-01-01 | End: 2022-01-01 | Stop reason: HOSPADM

## 2022-01-01 RX ORDER — ASPIRIN 81 MG/1
324 TABLET, CHEWABLE ORAL ONCE
Status: DISCONTINUED | OUTPATIENT
Start: 2022-01-01 | End: 2022-01-01

## 2022-01-01 RX ORDER — INSULIN LISPRO 100 [IU]/ML
2-7 INJECTION, SOLUTION INTRAVENOUS; SUBCUTANEOUS
Status: DISCONTINUED | OUTPATIENT
Start: 2022-01-01 | End: 2022-01-01

## 2022-01-01 RX ORDER — EPINEPHRINE 0.1 MG/ML
SYRINGE (ML) INJECTION
Status: COMPLETED | OUTPATIENT
Start: 2022-01-01 | End: 2022-01-01

## 2022-01-01 RX ORDER — TRAZODONE HYDROCHLORIDE 100 MG/1
100 TABLET ORAL NIGHTLY
COMMUNITY

## 2022-01-01 RX ORDER — HYDROXYZINE HYDROCHLORIDE 25 MG/1
25 TABLET, FILM COATED ORAL 3 TIMES DAILY PRN
COMMUNITY
End: 2022-01-01 | Stop reason: HOSPADM

## 2022-01-01 RX ORDER — ROSUVASTATIN CALCIUM 5 MG/1
10 TABLET, COATED ORAL EVERY 24 HOURS
Status: DISCONTINUED | OUTPATIENT
Start: 2022-01-01 | End: 2022-01-01 | Stop reason: HOSPADM

## 2022-01-01 RX ORDER — ONDANSETRON 4 MG/1
4 TABLET, FILM COATED ORAL EVERY 8 HOURS PRN
Status: DISCONTINUED | OUTPATIENT
Start: 2022-01-01 | End: 2022-01-01 | Stop reason: HOSPADM

## 2022-01-01 RX ORDER — ENOXAPARIN SODIUM 100 MG/ML
40 INJECTION SUBCUTANEOUS DAILY
Status: DISCONTINUED | OUTPATIENT
Start: 2022-01-01 | End: 2022-01-01 | Stop reason: HOSPADM

## 2022-01-01 RX ORDER — GUAIFENESIN/DEXTROMETHORPHAN 100-10MG/5
5 SYRUP ORAL EVERY 4 HOURS PRN
Qty: 473 ML | Refills: 2 | Status: SHIPPED | OUTPATIENT
Start: 2022-01-01 | End: 2022-12-25

## 2022-01-01 RX ORDER — ARIPIPRAZOLE 5 MG/1
5 TABLET ORAL DAILY
Status: DISCONTINUED | OUTPATIENT
Start: 2022-01-01 | End: 2022-01-01

## 2022-01-01 RX ORDER — SODIUM CHLORIDE 9 MG/ML
40 INJECTION, SOLUTION INTRAVENOUS CONTINUOUS
Status: DISCONTINUED | OUTPATIENT
Start: 2022-01-01 | End: 2022-01-01 | Stop reason: HOSPADM

## 2022-01-01 RX ORDER — FUROSEMIDE 10 MG/ML
40 INJECTION INTRAMUSCULAR; INTRAVENOUS ONCE
Status: COMPLETED | OUTPATIENT
Start: 2022-01-01 | End: 2022-01-01

## 2022-01-01 RX ORDER — IPRATROPIUM BROMIDE AND ALBUTEROL SULFATE 2.5; .5 MG/3ML; MG/3ML
3 SOLUTION RESPIRATORY (INHALATION)
Status: COMPLETED | OUTPATIENT
Start: 2022-01-01 | End: 2022-01-01

## 2022-01-01 RX ORDER — ACETAMINOPHEN 325 MG/1
325 TABLET ORAL EVERY 4 HOURS PRN
Status: DISCONTINUED | OUTPATIENT
Start: 2022-01-01 | End: 2022-01-01 | Stop reason: HOSPADM

## 2022-01-01 RX ORDER — SODIUM CHLORIDE, SODIUM LACTATE, POTASSIUM CHLORIDE, CALCIUM CHLORIDE 600; 310; 30; 20 MG/100ML; MG/100ML; MG/100ML; MG/100ML
30 INJECTION, SOLUTION INTRAVENOUS CONTINUOUS
Status: DISCONTINUED | OUTPATIENT
Start: 2022-01-01 | End: 2022-01-01 | Stop reason: HOSPADM

## 2022-01-01 RX ORDER — POTASSIUM CHLORIDE 1.5 G/1.77G
40 POWDER, FOR SOLUTION ORAL ONCE
Status: COMPLETED | OUTPATIENT
Start: 2022-01-01 | End: 2022-01-01

## 2022-01-01 RX ORDER — BENZONATATE 200 MG/1
200 CAPSULE ORAL 3 TIMES DAILY PRN
Qty: 90 CAPSULE | Refills: 2 | Status: SHIPPED | OUTPATIENT
Start: 2022-01-01 | End: 2022-12-25

## 2022-01-01 RX ORDER — METHYLPREDNISOLONE 4 MG/1
TABLET ORAL
Qty: 21 TABLET | Refills: 1 | Status: SHIPPED | OUTPATIENT
Start: 2022-01-01

## 2022-01-01 RX ORDER — OXYCODONE AND ACETAMINOPHEN 10; 325 MG/1; MG/1
1 TABLET ORAL EVERY 6 HOURS PRN
COMMUNITY

## 2022-01-01 RX ORDER — SODIUM CHLORIDE 0.9 % (FLUSH) 0.9 %
10 SYRINGE (ML) INJECTION EVERY 12 HOURS SCHEDULED
Status: DISCONTINUED | OUTPATIENT
Start: 2022-01-01 | End: 2022-01-01 | Stop reason: HOSPADM

## 2022-01-01 RX ORDER — ONDANSETRON 4 MG/1
4 TABLET, FILM COATED ORAL EVERY 8 HOURS PRN
Qty: 90 TABLET | Refills: 2 | Status: SHIPPED | OUTPATIENT
Start: 2022-01-01 | End: 2022-12-25

## 2022-01-01 RX ORDER — LIDOCAINE HYDROCHLORIDE 20 MG/ML
INJECTION, SOLUTION EPIDURAL; INFILTRATION; INTRACAUDAL; PERINEURAL AS NEEDED
Status: DISCONTINUED | OUTPATIENT
Start: 2022-01-01 | End: 2022-01-01 | Stop reason: SURG

## 2022-01-01 RX ORDER — TRIAMCINOLONE ACETONIDE 1 MG/G
CREAM TOPICAL 2 TIMES DAILY
Status: DISCONTINUED | OUTPATIENT
Start: 2022-01-01 | End: 2022-01-01 | Stop reason: HOSPADM

## 2022-01-01 RX ORDER — PROPOFOL 10 MG/ML
VIAL (ML) INTRAVENOUS AS NEEDED
Status: DISCONTINUED | OUTPATIENT
Start: 2022-01-01 | End: 2022-01-01 | Stop reason: SURG

## 2022-01-01 RX ORDER — HYDROXYZINE PAMOATE 50 MG/1
50 CAPSULE ORAL DAILY
Status: DISCONTINUED | OUTPATIENT
Start: 2022-01-01 | End: 2022-01-01

## 2022-01-01 RX ORDER — DEXTROSE MONOHYDRATE 25 G/50ML
25 INJECTION, SOLUTION INTRAVENOUS
Status: DISCONTINUED | OUTPATIENT
Start: 2022-01-01 | End: 2022-01-01

## 2022-01-01 RX ORDER — TEMAZEPAM 15 MG/1
15 CAPSULE ORAL NIGHTLY
Status: DISCONTINUED | OUTPATIENT
Start: 2022-01-01 | End: 2022-01-01 | Stop reason: HOSPADM

## 2022-01-01 RX ORDER — SODIUM CHLORIDE 9 MG/ML
40 INJECTION, SOLUTION INTRAVENOUS CONTINUOUS
Status: CANCELLED | OUTPATIENT
Start: 2022-01-01

## 2022-01-01 RX ORDER — HEPARIN SODIUM 10000 [USP'U]/100ML
18 INJECTION, SOLUTION INTRAVENOUS
Status: DISCONTINUED | OUTPATIENT
Start: 2022-01-01 | End: 2022-01-01 | Stop reason: HOSPADM

## 2022-01-01 RX ORDER — CLINDAMYCIN HYDROCHLORIDE 150 MG/1
300 CAPSULE ORAL 3 TIMES DAILY
Qty: 42 CAPSULE | Refills: 0 | Status: SHIPPED | OUTPATIENT
Start: 2022-01-01 | End: 2022-12-02

## 2022-01-01 RX ORDER — LIDOCAINE HYDROCHLORIDE 20 MG/ML
JELLY TOPICAL AS NEEDED
Status: DISCONTINUED | OUTPATIENT
Start: 2022-01-01 | End: 2022-01-01 | Stop reason: HOSPADM

## 2022-01-01 RX ORDER — IPRATROPIUM BROMIDE AND ALBUTEROL SULFATE 2.5; .5 MG/3ML; MG/3ML
3 SOLUTION RESPIRATORY (INHALATION)
Status: DISCONTINUED | OUTPATIENT
Start: 2022-01-01 | End: 2022-01-01 | Stop reason: HOSPADM

## 2022-01-01 RX ORDER — IPRATROPIUM BROMIDE AND ALBUTEROL SULFATE 2.5; .5 MG/3ML; MG/3ML
3 SOLUTION RESPIRATORY (INHALATION)
Qty: 51 ML | Refills: 0 | Status: SHIPPED | OUTPATIENT
Start: 2022-01-01 | End: 2022-01-01

## 2022-01-01 RX ORDER — ONDANSETRON 2 MG/ML
4 INJECTION INTRAMUSCULAR; INTRAVENOUS EVERY 4 HOURS PRN
Status: DISCONTINUED | OUTPATIENT
Start: 2022-01-01 | End: 2022-01-01 | Stop reason: HOSPADM

## 2022-01-01 RX ORDER — CLINDAMYCIN PHOSPHATE 600 MG/50ML
600 INJECTION INTRAVENOUS EVERY 8 HOURS
Status: DISCONTINUED | OUTPATIENT
Start: 2022-01-01 | End: 2022-01-01 | Stop reason: HOSPADM

## 2022-01-01 RX ORDER — LIDOCAINE HYDROCHLORIDE 10 MG/ML
INJECTION, SOLUTION EPIDURAL; INFILTRATION; INTRACAUDAL; PERINEURAL AS NEEDED
Status: DISCONTINUED | OUTPATIENT
Start: 2022-01-01 | End: 2022-01-01 | Stop reason: HOSPADM

## 2022-01-01 RX ORDER — NICOTINE POLACRILEX 4 MG
15 LOZENGE BUCCAL
Status: DISCONTINUED | OUTPATIENT
Start: 2022-01-01 | End: 2022-01-01

## 2022-01-01 RX ORDER — METOCLOPRAMIDE 5 MG/1
5 TABLET ORAL
COMMUNITY

## 2022-01-01 RX ORDER — ARIPIPRAZOLE 5 MG/1
5 TABLET ORAL NIGHTLY
Status: DISCONTINUED | OUTPATIENT
Start: 2022-01-01 | End: 2022-01-01 | Stop reason: HOSPADM

## 2022-01-01 RX ORDER — MAGNESIUM HYDROXIDE 1200 MG/15ML
LIQUID ORAL AS NEEDED
Status: DISCONTINUED | OUTPATIENT
Start: 2022-01-01 | End: 2022-01-01 | Stop reason: HOSPADM

## 2022-01-01 RX ORDER — SODIUM CHLORIDE 9 MG/ML
40 INJECTION, SOLUTION INTRAVENOUS AS NEEDED
Status: DISCONTINUED | OUTPATIENT
Start: 2022-01-01 | End: 2022-01-01 | Stop reason: HOSPADM

## 2022-01-01 RX ORDER — HYDROXYZINE PAMOATE 25 MG/1
25 CAPSULE ORAL 3 TIMES DAILY PRN
Status: DISCONTINUED | OUTPATIENT
Start: 2022-01-01 | End: 2022-01-01 | Stop reason: HOSPADM

## 2022-01-01 RX ORDER — ROSUVASTATIN CALCIUM 5 MG/1
10 TABLET, COATED ORAL NIGHTLY
Status: DISCONTINUED | OUTPATIENT
Start: 2022-01-01 | End: 2022-01-01 | Stop reason: HOSPADM

## 2022-01-01 RX ORDER — AMIODARONE HYDROCHLORIDE 50 MG/ML
INJECTION, SOLUTION INTRAVENOUS
Status: COMPLETED | OUTPATIENT
Start: 2022-01-01 | End: 2022-01-01

## 2022-01-01 RX ADMIN — IPRATROPIUM BROMIDE AND ALBUTEROL SULFATE 3 ML: .5; 3 SOLUTION RESPIRATORY (INHALATION) at 22:39

## 2022-01-01 RX ADMIN — TOPIRAMATE 100 MG: 100 TABLET, FILM COATED ORAL at 09:22

## 2022-01-01 RX ADMIN — FUROSEMIDE 20 MG: 20 TABLET ORAL at 09:22

## 2022-01-01 RX ADMIN — EPINEPHRINE 1 MG: 0.1 INJECTION INTRACARDIAC; INTRAVENOUS at 01:47

## 2022-01-01 RX ADMIN — TRAZODONE HYDROCHLORIDE 150 MG: 100 TABLET ORAL at 21:13

## 2022-01-01 RX ADMIN — BENZONATATE 100 MG: 100 CAPSULE ORAL at 21:51

## 2022-01-01 RX ADMIN — PROPOFOL 175 MCG/KG/MIN: 10 INJECTION, EMULSION INTRAVENOUS at 13:45

## 2022-01-01 RX ADMIN — CLINDAMYCIN IN 5 PERCENT DEXTROSE 600 MG: 12 INJECTION, SOLUTION INTRAVENOUS at 22:23

## 2022-01-01 RX ADMIN — POTASSIUM CHLORIDE 40 MEQ: 1.5 POWDER, FOR SOLUTION ORAL at 21:14

## 2022-01-01 RX ADMIN — TRAZODONE HYDROCHLORIDE 150 MG: 100 TABLET ORAL at 22:23

## 2022-01-01 RX ADMIN — IOPAMIDOL 100 ML: 755 INJECTION, SOLUTION INTRAVENOUS at 23:58

## 2022-01-01 RX ADMIN — BARIUM SULFATE 135 ML: 980 POWDER, FOR SUSPENSION ORAL at 11:35

## 2022-01-01 RX ADMIN — METHYLPREDNISOLONE SODIUM SUCCINATE 125 MG: 125 INJECTION, POWDER, FOR SOLUTION INTRAMUSCULAR; INTRAVENOUS at 23:40

## 2022-01-01 RX ADMIN — HYDROCODONE POLISTIREX AND CHLORPHENIRAMINE POLISTIREX 5 ML: 10; 8 SUSPENSION, EXTENDED RELEASE ORAL at 22:23

## 2022-01-01 RX ADMIN — TOPIRAMATE 100 MG: 100 TABLET, FILM COATED ORAL at 10:37

## 2022-01-01 RX ADMIN — LIDOCAINE HYDROCHLORIDE 50 MG: 20 INJECTION, SOLUTION EPIDURAL; INFILTRATION; INTRACAUDAL; PERINEURAL at 13:45

## 2022-01-01 RX ADMIN — FUROSEMIDE 20 MG: 20 TABLET ORAL at 10:36

## 2022-01-01 RX ADMIN — ENOXAPARIN SODIUM 40 MG: 100 INJECTION SUBCUTANEOUS at 18:12

## 2022-01-01 RX ADMIN — CEFTRIAXONE SODIUM 1 G: 1 INJECTION, SOLUTION INTRAVENOUS at 13:52

## 2022-01-01 RX ADMIN — IPRATROPIUM BROMIDE AND ALBUTEROL SULFATE 3 ML: 2.5; .5 SOLUTION RESPIRATORY (INHALATION) at 20:11

## 2022-01-01 RX ADMIN — ARIPIPRAZOLE 5 MG: 5 TABLET ORAL at 21:18

## 2022-01-01 RX ADMIN — ROSUVASTATIN CALCIUM 10 MG: 5 TABLET, FILM COATED ORAL at 21:13

## 2022-01-01 RX ADMIN — BENZONATATE 200 MG: 100 CAPSULE ORAL at 17:11

## 2022-01-01 RX ADMIN — CLINDAMYCIN IN 5 PERCENT DEXTROSE 600 MG: 12 INJECTION, SOLUTION INTRAVENOUS at 06:15

## 2022-01-01 RX ADMIN — EPINEPHRINE 1 MG: 0.1 INJECTION INTRACARDIAC; INTRAVENOUS at 01:44

## 2022-01-01 RX ADMIN — TRIAMCINOLONE ACETONIDE: 1 CREAM TOPICAL at 10:38

## 2022-01-01 RX ADMIN — ENOXAPARIN SODIUM 40 MG: 100 INJECTION SUBCUTANEOUS at 10:30

## 2022-01-01 RX ADMIN — ATROPINE SULFATE 1 MG: 0.1 INJECTION INTRAVENOUS at 01:40

## 2022-01-01 RX ADMIN — METHYLPREDNISOLONE SODIUM SUCCINATE 125 MG: 125 INJECTION, POWDER, FOR SOLUTION INTRAMUSCULAR; INTRAVENOUS at 11:41

## 2022-01-01 RX ADMIN — ACETAMINOPHEN 650 MG: 325 TABLET ORAL at 17:14

## 2022-01-01 RX ADMIN — CYCLOBENZAPRINE 10 MG: 10 TABLET, FILM COATED ORAL at 10:59

## 2022-01-01 RX ADMIN — FUROSEMIDE 20 MG: 20 TABLET ORAL at 10:30

## 2022-01-01 RX ADMIN — ARIPIPRAZOLE 5 MG: 5 TABLET ORAL at 18:16

## 2022-01-01 RX ADMIN — TEMAZEPAM 15 MG: 15 CAPSULE ORAL at 21:18

## 2022-01-01 RX ADMIN — TRIAMCINOLONE ACETONIDE: 1 CREAM TOPICAL at 09:23

## 2022-01-01 RX ADMIN — LIDOCAINE HYDROCHLORIDE 5 ML: 10 INJECTION, SOLUTION EPIDURAL; INFILTRATION; INTRACAUDAL; PERINEURAL at 11:47

## 2022-01-01 RX ADMIN — ONDANSETRON 4 MG: 2 INJECTION INTRAMUSCULAR; INTRAVENOUS at 18:51

## 2022-01-01 RX ADMIN — TRIAMCINOLONE ACETONIDE: 1 CREAM TOPICAL at 21:15

## 2022-01-01 RX ADMIN — ACETAMINOPHEN 650 MG: 325 TABLET ORAL at 10:29

## 2022-01-01 RX ADMIN — HYDROCODONE POLISTIREX AND CHLORPHENIRAMINE POLISTIREX 5 ML: 10; 8 SUSPENSION, EXTENDED RELEASE ORAL at 21:14

## 2022-01-01 RX ADMIN — LINACLOTIDE 72 MCG: 72 CAPSULE, GELATIN COATED ORAL at 10:37

## 2022-01-01 RX ADMIN — IPRATROPIUM BROMIDE AND ALBUTEROL SULFATE 3 ML: 2.5; .5 SOLUTION RESPIRATORY (INHALATION) at 11:46

## 2022-01-01 RX ADMIN — ASPIRIN 81 MG CHEWABLE TABLET 81 MG: 81 TABLET CHEWABLE at 10:30

## 2022-01-01 RX ADMIN — EPINEPHRINE 1 MG: 0.1 INJECTION INTRACARDIAC; INTRAVENOUS at 01:52

## 2022-01-01 RX ADMIN — IPRATROPIUM BROMIDE AND ALBUTEROL SULFATE 3 ML: 2.5; .5 SOLUTION RESPIRATORY (INHALATION) at 00:54

## 2022-01-01 RX ADMIN — ASPIRIN 81 MG CHEWABLE TABLET 81 MG: 81 TABLET CHEWABLE at 09:22

## 2022-01-01 RX ADMIN — CLINDAMYCIN IN 5 PERCENT DEXTROSE 600 MG: 12 INJECTION, SOLUTION INTRAVENOUS at 06:34

## 2022-01-01 RX ADMIN — EPINEPHRINE 1 MG: 0.1 INJECTION INTRACARDIAC; INTRAVENOUS at 01:40

## 2022-01-01 RX ADMIN — AZITHROMYCIN 500 MG: 500 INJECTION, POWDER, LYOPHILIZED, FOR SOLUTION INTRAVENOUS at 14:52

## 2022-01-01 RX ADMIN — AMIODARONE HYDROCHLORIDE 300 MG: 50 INJECTION, SOLUTION INTRAVENOUS at 01:49

## 2022-01-01 RX ADMIN — TEMAZEPAM 15 MG: 15 CAPSULE ORAL at 22:23

## 2022-01-01 RX ADMIN — BARIUM SULFATE 700 MG: 700 TABLET ORAL at 11:35

## 2022-01-01 RX ADMIN — IPRATROPIUM BROMIDE AND ALBUTEROL SULFATE 3 ML: 2.5; .5 SOLUTION RESPIRATORY (INHALATION) at 18:49

## 2022-01-01 RX ADMIN — LINACLOTIDE 72 MCG: 72 CAPSULE, GELATIN COATED ORAL at 10:58

## 2022-01-01 RX ADMIN — TRIAMCINOLONE ACETONIDE: 1 CREAM TOPICAL at 10:32

## 2022-01-01 RX ADMIN — FUROSEMIDE 20 MG: 20 TABLET ORAL at 18:13

## 2022-01-01 RX ADMIN — ASPIRIN 81 MG CHEWABLE TABLET 81 MG: 81 TABLET CHEWABLE at 18:13

## 2022-01-01 RX ADMIN — IPRATROPIUM BROMIDE AND ALBUTEROL SULFATE 3 ML: 2.5; .5 SOLUTION RESPIRATORY (INHALATION) at 12:23

## 2022-01-01 RX ADMIN — Medication 10 ML: at 10:34

## 2022-01-01 RX ADMIN — FUROSEMIDE 40 MG: 10 INJECTION, SOLUTION INTRAMUSCULAR; INTRAVENOUS at 21:51

## 2022-01-01 RX ADMIN — IPRATROPIUM BROMIDE AND ALBUTEROL SULFATE 3 ML: 2.5; .5 SOLUTION RESPIRATORY (INHALATION) at 08:00

## 2022-01-01 RX ADMIN — Medication 10 ML: at 21:14

## 2022-01-01 RX ADMIN — IPRATROPIUM BROMIDE AND ALBUTEROL SULFATE 3 ML: 2.5; .5 SOLUTION RESPIRATORY (INHALATION) at 07:26

## 2022-01-01 RX ADMIN — ENOXAPARIN SODIUM 40 MG: 100 INJECTION SUBCUTANEOUS at 10:36

## 2022-01-01 RX ADMIN — Medication 10 ML: at 21:18

## 2022-01-01 RX ADMIN — IPRATROPIUM BROMIDE AND ALBUTEROL SULFATE 3 ML: 2.5; .5 SOLUTION RESPIRATORY (INHALATION) at 07:30

## 2022-01-01 RX ADMIN — BENZONATATE 200 MG: 100 CAPSULE ORAL at 04:21

## 2022-01-01 RX ADMIN — IPRATROPIUM BROMIDE AND ALBUTEROL SULFATE 3 ML: 2.5; .5 SOLUTION RESPIRATORY (INHALATION) at 00:35

## 2022-01-01 RX ADMIN — BARIUM SULFATE 1 TEASPOON(S): 0.6 CREAM ORAL at 11:35

## 2022-01-01 RX ADMIN — IPRATROPIUM BROMIDE AND ALBUTEROL SULFATE 3 ML: .5; 3 SOLUTION RESPIRATORY (INHALATION) at 22:35

## 2022-01-01 RX ADMIN — ACETAMINOPHEN 650 MG: 325 TABLET ORAL at 12:58

## 2022-01-01 RX ADMIN — HYDROCODONE POLISTIREX AND CHLORPHENIRAMINE POLISTIREX 5 ML: 10; 8 SUSPENSION, EXTENDED RELEASE ORAL at 10:37

## 2022-01-01 RX ADMIN — TOPIRAMATE 100 MG: 100 TABLET, FILM COATED ORAL at 18:13

## 2022-01-01 RX ADMIN — HYDROCODONE POLISTIREX AND CHLORPHENIRAMINE POLISTIREX 5 ML: 10; 8 SUSPENSION, EXTENDED RELEASE ORAL at 21:19

## 2022-01-01 RX ADMIN — TRAZODONE HYDROCHLORIDE 150 MG: 100 TABLET ORAL at 21:18

## 2022-01-01 RX ADMIN — HYDROCODONE POLISTIREX AND CHLORPHENIRAMINE POLISTIREX 5 ML: 10; 8 SUSPENSION, EXTENDED RELEASE ORAL at 09:22

## 2022-01-01 RX ADMIN — TRIAMCINOLONE ACETONIDE: 1 CREAM TOPICAL at 22:24

## 2022-01-01 RX ADMIN — BARIUM SULFATE 355 ML: 0.6 SUSPENSION ORAL at 11:35

## 2022-01-01 RX ADMIN — TAZOBACTAM SODIUM AND PIPERACILLIN SODIUM 3.38 G: 375; 3 INJECTION, SOLUTION INTRAVENOUS at 23:41

## 2022-01-01 RX ADMIN — TOPIRAMATE 100 MG: 100 TABLET, FILM COATED ORAL at 10:32

## 2022-01-01 RX ADMIN — HYDROXYZINE PAMOATE 50 MG: 50 CAPSULE ORAL at 18:12

## 2022-01-01 RX ADMIN — PROPOFOL 100 MG: 10 INJECTION, EMULSION INTRAVENOUS at 13:45

## 2022-01-01 RX ADMIN — CYCLOBENZAPRINE 10 MG: 10 TABLET, FILM COATED ORAL at 13:44

## 2022-01-01 RX ADMIN — CLINDAMYCIN IN 5 PERCENT DEXTROSE 600 MG: 12 INJECTION, SOLUTION INTRAVENOUS at 18:13

## 2022-01-01 RX ADMIN — VANCOMYCIN HYDROCHLORIDE 1250 MG: 5 INJECTION, POWDER, LYOPHILIZED, FOR SOLUTION INTRAVENOUS at 00:17

## 2022-01-01 RX ADMIN — ENOXAPARIN SODIUM 40 MG: 100 INJECTION SUBCUTANEOUS at 09:22

## 2022-01-01 RX ADMIN — GADOBENATE DIMEGLUMINE 10 ML: 529 INJECTION, SOLUTION INTRAVENOUS at 20:45

## 2022-01-01 RX ADMIN — IPRATROPIUM BROMIDE AND ALBUTEROL SULFATE 3 ML: 2.5; .5 SOLUTION RESPIRATORY (INHALATION) at 11:44

## 2022-01-01 RX ADMIN — HEPARIN SODIUM 18 UNITS/KG/HR: 10000 INJECTION, SOLUTION INTRAVENOUS at 01:02

## 2022-01-01 RX ADMIN — CLINDAMYCIN IN 5 PERCENT DEXTROSE 600 MG: 12 INJECTION, SOLUTION INTRAVENOUS at 17:11

## 2022-01-01 RX ADMIN — GUAIFENESIN AND DEXTROMETHORPHAN 5 ML: 100; 10 SYRUP ORAL at 09:22

## 2022-01-01 RX ADMIN — IPRATROPIUM BROMIDE AND ALBUTEROL SULFATE 3 ML: 2.5; .5 SOLUTION RESPIRATORY (INHALATION) at 11:45

## 2022-01-01 RX ADMIN — IPRATROPIUM BROMIDE AND ALBUTEROL SULFATE 3 ML: 2.5; .5 SOLUTION RESPIRATORY (INHALATION) at 01:53

## 2022-01-01 RX ADMIN — ROSUVASTATIN CALCIUM 10 MG: 5 TABLET, FILM COATED ORAL at 21:18

## 2022-01-01 RX ADMIN — Medication 10 ML: at 10:38

## 2022-01-01 RX ADMIN — Medication 10 ML: at 22:23

## 2022-01-01 RX ADMIN — IPRATROPIUM BROMIDE AND ALBUTEROL SULFATE 3 ML: 2.5; .5 SOLUTION RESPIRATORY (INHALATION) at 19:06

## 2022-01-01 RX ADMIN — CLINDAMYCIN IN 5 PERCENT DEXTROSE 600 MG: 12 INJECTION, SOLUTION INTRAVENOUS at 02:07

## 2022-01-01 RX ADMIN — ANTACID/ANTIFLATULENT 1 PACKET: 380; 550; 10; 10 GRANULE, EFFERVESCENT ORAL at 11:35

## 2022-01-01 RX ADMIN — IOPAMIDOL 150 ML: 755 INJECTION, SOLUTION INTRAVENOUS at 17:58

## 2022-01-01 RX ADMIN — ARIPIPRAZOLE 5 MG: 5 TABLET ORAL at 22:23

## 2022-01-01 RX ADMIN — ATROPINE SULFATE 1 MG: 0.1 INJECTION INTRAVENOUS at 01:36

## 2022-01-01 RX ADMIN — LINACLOTIDE 72 MCG: 72 CAPSULE, GELATIN COATED ORAL at 06:34

## 2022-01-01 RX ADMIN — ROSUVASTATIN CALCIUM 10 MG: 5 TABLET, FILM COATED ORAL at 22:23

## 2022-01-01 RX ADMIN — IOPAMIDOL 100 ML: 755 INJECTION, SOLUTION INTRAVENOUS at 20:17

## 2022-01-01 RX ADMIN — TRIAMCINOLONE ACETONIDE: 1 CREAM TOPICAL at 21:18

## 2022-01-01 RX ADMIN — CLINDAMYCIN IN 5 PERCENT DEXTROSE 600 MG: 12 INJECTION, SOLUTION INTRAVENOUS at 23:13

## 2022-01-01 RX ADMIN — IPRATROPIUM BROMIDE AND ALBUTEROL SULFATE 3 ML: 2.5; .5 SOLUTION RESPIRATORY (INHALATION) at 11:47

## 2022-01-01 RX ADMIN — TEMAZEPAM 15 MG: 15 CAPSULE ORAL at 21:13

## 2022-01-01 RX ADMIN — HYDROCODONE POLISTIREX AND CHLORPHENIRAMINE POLISTIREX 5 ML: 10; 8 SUSPENSION, EXTENDED RELEASE ORAL at 10:30

## 2022-01-01 RX ADMIN — BARIUM SULFATE 55 ML: 0.81 POWDER, FOR SUSPENSION ORAL at 11:35

## 2022-01-01 RX ADMIN — SODIUM CHLORIDE, POTASSIUM CHLORIDE, SODIUM LACTATE AND CALCIUM CHLORIDE 30 ML/HR: 600; 310; 30; 20 INJECTION, SOLUTION INTRAVENOUS at 13:23

## 2022-01-01 RX ADMIN — ASPIRIN 81 MG CHEWABLE TABLET 81 MG: 81 TABLET CHEWABLE at 10:36

## 2022-01-01 RX ADMIN — IPRATROPIUM BROMIDE AND ALBUTEROL SULFATE 3 ML: .5; 3 SOLUTION RESPIRATORY (INHALATION) at 22:38

## 2022-02-03 NOTE — OUTREACH NOTE
Ambulatory Case Management Note    CCM Interim Update        Per chart review the patient has had one visit to [facility] since last ccm Period . I called the patient and left message to return my call .      There are no recently modified care plans to display for this patient.      Michele Loera MA  Ambulatory Case Management    2/3/2022, 13:30 EST

## 2022-02-28 NOTE — OUTREACH NOTE
Ambulatory Case Management Note    CCM Interim Update    Per CCM call patient reports she changed to an [employer] MCR plan and was informed [facility] is not on their list for PCP. I obtained her [employer] card number and group number and provider phone number to call and see as this has happened in past for another patient and [facility] was covered. Her member number is [id number] and group number is [id number] and the provider services number on the card is [phone]. She is needing a referral to get a breast reduction and Dr. Briscoe is her acting PCP for now. She is still seeing her for weight management. She also saw pain management who informed her that she needed a breast reduction, or lift, to help with back and neck pain and may be able to come off pain medication if that is the case. She is requesting I call to see if [facility] is still able to be her PCP. I told her if not then I would have to stop CCM program until she has her other insurance. She would like to see if [facility] can refer her for the general surgeon for the breast lift as well.     Depression is well controlled and constipation comes and goes and she takes her medication as needed, if she takes it on regular basis she had diarrhea that is not controllable.         There are no recently modified care plans to display for this patient.      Sahara Zarate RN  Ambulatory Case Management    2/28/2022, 13:12 EST

## 2022-10-27 NOTE — MBS/VFSS/FEES
Outpatient - Speech Language Pathology   Swallow Modified Barium Swallow ALLISON Kelly     Patient Name: Annetta Malagon  : 1955  MRN: [id number]  Today's Date: 10/27/2022               Admit Date: 10/27/2022    Visit Dx:     ICD-10-CM ICD-9-CM   1. Dysphagia, pharyngoesophageal  R13.14 787.24     Patient Active Problem List   Diagnosis   • Right hip pain   • Aftercare following surgery of the musculoskeletal system   • Age related osteoporosis   • Anxiety   • Arthritis of right hip   • Bilateral carpal tunnel syndrome   • Bipolar disorder ([facility])   • Bladder disorder   • Chronic migraine   • Broken bones   • Congestive heart failure ([facility])   • Degeneration of lumbosacral intervertebral disc   • Drug-induced constipation   • Chronic pain   • Gastroesophageal reflux disease without esophagitis   • Hypertension   • Hemorrhoids   • Hypothyroid   • Lumbar spondylosis   • Lumbosacral spondylosis without myelopathy   • Major depressive disorder   • Mixed hyperlipidemia   • Osteoarthritis   • Neuropathy   • Osteoarthritis of hip   • Primary insomnia   • Varicose veins of both lower extremities   • Vitamin D deficiency   • History of hip replacement     Past Medical History:   Diagnosis Date   • Anxiety    • Arthritis    • Bipolar disorder ([facility])    • Bladder disorder    • Broken bones 2011   • CHF (congestive heart failure) ([facility])    • Chronic pain 2019   • Claustrophobia    • DM2 (diabetes mellitus, type 2) ([facility])    • GERD (gastroesophageal reflux disease) 2018   • Hemorrhoids    • HLD (hyperlipidemia)    • HTN (hypertension)    • Hypothyroid 2018   • Insomnia    • Insomnia, unspecified 11/15/2019   • Kidney calculus    • Kidney disease    • Limb swelling    • Lung disease    • Major depressive disorder 2019   • MDD (major depressive disorder) 2019   • Osteoarthritis 11/15/2019   • Osteopenia 11/15/2019   • Reflux disease    • Thyroid disorder    • Thyroid trouble     • Type 2 diabetes mellitus (HCC) 2010   • Varicose veins of both lower extremities 08/30/2018   • Vitamin D deficiency 08/30/2018     Past Surgical History:   Procedure Laterality Date   • ANKLE SURGERY Right 2011    Repair    • ANKLE SURGERY Right 2011   • APPENDECTOMY     • COLONOSCOPY  2011,2017   • COLONOSCOPY  2011, 2017   • GALLBLADDER SURGERY     • HEMORRHOIDECTOMY  1982   • HEMORROIDECTOMY  1982   • HIP SURGERY  2015    broke   • HIP SURGERY  2015    Broke   • HYSTERECTOMY  1995   • KIDNEY SURGERY     • OTHER SURGICAL HISTORY      Surgical clips   • OTHER SURGICAL HISTORY      Surgical Clips   • TONSILLECTOMY  1960   • TUBAL ABDOMINAL LIGATION  1982     PERTINENT INFORMATION:  This patient is a 66year old female referred for modified barium swallow.  Patient reports globus sensation with solids primarily steak..    Patient was referred for an MBSS by  to rule out aspiration as well as to determine appropriate treatment plan for this patient.      PROCEDURE:    Patient lateral projection was alert and cooperative.  The patient was viewed in lateral projection.  The following Ba consistencies were administered: Thin liquids, purée consistencies and regular solids the following compensatory swallowing strategies were performed: Liquid wash, double swallow      RESULTS:    1.  Oral stage is characterized by mildly prolonged mastication with solids.  Timely oral transit.  Pharyngeal phase is timely but with reduced laryngeal elevation and sluggish epiglottic inversion in recoil.  This results in vallecular residue after the swallow with solids.  Liquid wash and double swallow are effective in clearing.  No penetration or aspiration noted.      IMPRESSIONS:    Patient demonstrated moderate pharyngeal dysphagia characterized by vallecular residue after the swallow with solids due to sluggish epiglottic movement and reduced laryngeal elevation.  This clears with double swallow and liquid wash.  No  penetration or aspiration noted.    FUNCTIONAL DEFICIT: Patient scored level 5 of 7 on Functional Communication Measures for swallowing indicating a 20-39% limitation in function for current status, goal status, and discharge status.      RECOMMENDATIONS:   1.  Regular diet-thin liquids  2.  Add sauce/gravy to dry foods  3.  Alternate solids and liquids  4.  Double swallow        Yes patient/responsible party agrees with the plan of care and has been informed of all alternatives, risks and benefits.    Thank you for this referral.      EDUCATION  The patient has been educated in the following areas:   Dysphagia (Swallowing Impairment).            Mariel Dhillon, SLP  10/27/2022

## 2022-11-15 PROBLEM — R91.8 MASS OF LOWER LOBE OF RIGHT LUNG: Status: ACTIVE | Noted: 2022-01-01

## 2022-11-16 PROBLEM — J44.9 OBSTRUCTIVE CHRONIC BRONCHITIS WITHOUT EXACERBATION: Chronic | Status: ACTIVE | Noted: 2022-01-01

## 2022-11-16 PROBLEM — J44.89 OBSTRUCTIVE CHRONIC BRONCHITIS WITHOUT EXACERBATION: Chronic | Status: ACTIVE | Noted: 2022-01-01

## 2022-11-16 PROBLEM — R13.19 OTHER DYSPHAGIA: Chronic | Status: ACTIVE | Noted: 2022-01-01

## 2022-11-16 PROBLEM — F17.200 TOBACCO USE DISORDER: Chronic | Status: ACTIVE | Noted: 2022-01-01

## 2022-11-16 PROBLEM — E78.00 HYPERCHOLESTEREMIA: Chronic | Status: ACTIVE | Noted: 2022-01-01

## 2022-11-16 NOTE — CONSULTS
[facility]   History and Physical    Patient Name: Annetta Malagon  : 1955  MRN: [id number]  Primary Care Physician: Cody Varghese MD  Referring Physician: Joanne Varghese MD  Date of admission: (Not on file)    Subjective   Subjective     Reason for Consult/ Chief Complaint:   Evaluation of 2.2 cm mass in the right lower lobe  Has dysphagia  Chronic cough  Weight loss  COPD  Nicotine dependence    HPI:  Annetta Malagon is a 66 y.o. female schedule for bronchoscopy for evaluation of right lower lobe mass and hilar adenopathy by CT chest.   C/O SOB, cough nonproductive and inspiratory wheezing  Weight loss in the last 2 and half months of 10 to 15 pounds  There is no runny nose, sore throat, has heart burn.  There is made chest pain no hemoptysis.  There is no edema, clubbing or cyanosis.  Patient sleeps on 1 pillow and can take care of activities.  Watched at home by family.    Occupation: Retired  Immunization flu: Unknown, pneumonia: Unknown,  PPD test status: Negative  Does not use oxygen at home  Does not use CPAP at home  Intubated for Operative procedures  Pets:: Unknown  Travel history: Never out of [place]  Last PFT: None recent      Review of Systems   Constitutional: Positive for appetite change, fatigue and unexpected weight change.   HENT: Positive for postnasal drip.    Eyes: Negative.    Respiratory: Positive for cough, chest tightness, shortness of breath and wheezing.    Cardiovascular: Positive for chest pain.   Gastrointestinal: Negative.    Endocrine: Negative.    Genitourinary: Negative.    Musculoskeletal: Positive for arthralgias and back pain.   Skin: Negative.    Allergic/Immunologic: Negative.    Neurological: Positive for weakness.   Hematological: Positive for adenopathy.   Psychiatric/Behavioral:        Anxious        Personal History     Past Medical History:   Diagnosis Date   • Anxiety    • Arthritis    • Bipolar disorder (HCC)    • Bladder disorder    •  Broken bones 05/2011   • CHF (congestive heart failure) ([facility])    • Chronic pain 05/17/2019   • Claustrophobia    • DM2 (diabetes mellitus, type 2) ([facility]) 2010   • GERD (gastroesophageal reflux disease) 08/30/2018   • Hemorrhoids    • HLD (hyperlipidemia)    • HTN (hypertension)    • Hypothyroid 08/30/2018   • Insomnia    • Insomnia, unspecified 11/15/2019   • Kidney calculus 1983   • Kidney disease    • Limb swelling    • Lung disease    • Major depressive disorder 05/17/2019   • MDD (major depressive disorder) 05/17/2019   • Osteoarthritis 11/15/2019   • Osteopenia 11/15/2019   • Reflux disease 2010   • Thyroid disorder 2008   • Thyroid trouble 2008   • Type 2 diabetes mellitus ([facility]) 2010   • Varicose veins of both lower extremities 08/30/2018   • Vitamin D deficiency 08/30/2018       Past Surgical History:   Procedure Laterality Date   • ANKLE SURGERY Right 2011    Repair    • ANKLE SURGERY Right 2011   • APPENDECTOMY     • COLONOSCOPY  2011,2017   • COLONOSCOPY  2011, 2017   • GALLBLADDER SURGERY     • HEMORRHOIDECTOMY  1982   • HEMORROIDECTOMY  1982   • HIP SURGERY  2015    broke   • HIP SURGERY  2015    Broke   • HYSTERECTOMY  1995   • KIDNEY SURGERY     • OTHER SURGICAL HISTORY      Surgical clips   • OTHER SURGICAL HISTORY      Surgical Clips   • TONSILLECTOMY  1960   • TUBAL ABDOMINAL LIGATION  1982       Family History: family history includes Alzheimer's disease in her father; Arthritis in her father and sister; Bone cancer in her mother; Brain cancer in her mother; Cancer in her brother and mother; Colon cancer in her maternal cousin; Diabetes in her son; Heart disease in her mother, sister, and sister; Liver cancer in her mother; Lung cancer in her mother; Osteoporosis in her sister and sister; Ovarian cancer in her mother; Stroke in her father. Otherwise pertinent FHx was reviewed and not pertinent to current issue.    Social History:  reports that she quit smoking about 8 years ago. Her smoking use  included cigarettes. She has a 96.00 pack-year smoking history. She has never used smokeless tobacco. She reports current alcohol use. She reports that she does not use drugs.    Home Medications:  ARIPiprazole, Diclofenac Sodium, Vitamin D3, acetaminophen, ammonium lactate, aspirin, cyclobenzaprine, escitalopram, fluticasone, furosemide, gabapentin, hydrOXYzine pamoate, linaclotide, meloxicam, nystatin, oxyCODONE, oxyCODONE-acetaminophen, phentermine, rosuvastatin, topiramate, traZODone, and triamcinolone      Allergies:  Allergies   Allergen Reactions   • Celexa [Citalopram] Unknown - High Severity     hallucinations   • Duloxetine Hcl Hallucinations       Objective    Objective   Vitals:       Physical Exam:   Constitutional: Awake, alert   Eyes: PERRLA, sclerae anicteric, no conjunctival injection, bilateral lens implants   HENT: NCAT, mucous membranes moist   Neck: Supple, no thyromegaly, there is neck lymphadenopathy, trachea midline,   Respiratory: Clear to auscultation bilaterally, nonlabored respirations    Cardiovascular: RRR, no murmurs, rubs, or gallops, palpable pedal pulses bilaterally   Gastrointestinal: Positive bowel sounds, soft, nontender, nondistended   Musculoskeletal: No bilateral ankle edema, no clubbing or cyanosis to extremities   Psychiatric: Appropriate affect, cooperative   Neurologic: Oriented x 3, strength symmetric in all extremities, Cranial Nerves grossly intact to  confrontation, speech clear   Extremities: Right ankle and hip, left hip and wrist scar   Skin: No rashes         Result Review    Result Review:  I have personally reviewed the results from the time of this admission to 11/16/22 5:22 AM EST and agree with these findings:  [x]  Laboratory  [x]  Microbiology  [x]  Radiology  []  EKG/Telemetry   []  Cardiology/Vascular   []  Pathology  []  Old records  [x]  Other:  Most notable findings include:   CT chest 11/7/2022 shows 2.2 cm right lower lobe mass with right hilar  mediastinal and subcarinal lymphadenopathy emphysema narrowing of the right middle and right lower lobe endobronchial from lymph node compression and coronary artery calcification    Assessment & Plan   Assessment / Plan     Brief Patient Summary:  Annetta Malagon is a 66 y.o. female who Ms. Emelia Malagon is a 66-year-old female being evaluated for right lower lobe mass by bronchoscopy for tissue diagnosis to establish diagnosis she also has mediastinal right hilar and  and subcarinal lymphadenopathy which can be further evaluated by EBUS if the bronchoscopy tissue diagnosis is negative  Active Hospital Problems:  Active Hospital Problems    Diagnosis    • **Mass of lower lobe of right lung    • Obstructive chronic bronchitis without exacerbation (HCC)    • Tobacco use disorder    • Hypercholesteremia    • Other dysphagia        Plan:   Mass of lower lobe of right lung: Evaluation by bronchoscopy for tissue diagnosis  Under MAC  N.p.o. after midnight last night for the procedure  Risks and benefits of the procedure including pneumothorax and hemorrhage were explained to the patient  To obtain consent for the procedure     Obstructive chronic bronchitis without exacerbation (HCC) : Schedule outpatient pulmonary function test    Tobacco use disorder: She was counseled on cessation of smoking     Hypercholesteremia: Continue oral Crestor     Dysphagia: Continue oral metoclopramide    Patient can be discharged home after procedures when vitals are stable follow-up at my office in 1 week to discuss tissue results        Electronically signed by Joanne Varghese MD, 11/16/22, 5:22 AM EST.

## 2022-11-16 NOTE — ANESTHESIA POSTPROCEDURE EVALUATION
Patient: Annetta Malagon    Procedure Summary     Date: 11/16/22 Room / Location: [facility] END[facility]COPY 1 / [facility]    Anesthesia Start: 1340 Anesthesia Stop: 1423    Procedure: BRONCHOSCOPY WITH BAL, WASHINGS, BRUSHINGS  AND BIOPSIES (Bronchus) Diagnosis:       Mass of lower lobe of right lung      (Mass of lower lobe of right lung [R91.8])    Surgeons: Joanne Varghese MD Provider: George Barajas MD    Anesthesia Type: general ASA Status: 3          Anesthesia Type: general    Vitals  Vitals Value Taken Time   /69 11/16/22 1436   Temp 36.2 °C (97.2 °F) 11/16/22 1436   Pulse 83 11/16/22 1436   Resp 22 11/16/22 1436   SpO2 96 % 11/16/22 1436           Post Anesthesia Care and Evaluation    Patient location during evaluation: bedside  Patient participation: complete - patient participated  Level of consciousness: awake  Pain management: adequate    Airway patency: patent  Anesthetic complications: No anesthetic complications  PONV Status: none  Cardiovascular status: acceptable and stable  Respiratory status: acceptable  Hydration status: acceptable    Comments: An Anesthesiologist personally participated in the most demanding procedures (including induction and emergence if applicable) in the anesthesia plan, monitored the course of anesthesia administration at frequent intervals and remained physically present and available for immediate diagnosis and treatment of emergencies.

## 2022-11-16 NOTE — ANESTHESIA PREPROCEDURE EVALUATION
Anesthesia Evaluation     Patient summary reviewed and Nursing notes reviewed   no history of anesthetic complications:  NPO Solid Status: > 8 hours  NPO Liquid Status: > 2 hours           Airway   Mallampati: II  TM distance: >3 FB  Neck ROM: full  No difficulty expected  Dental    (+) upper dentures    Pulmonary - normal exam    breath sounds clear to auscultation  (+) COPD,   Cardiovascular - normal exam  Exercise tolerance: good (4-7 METS)    Rhythm: regular  Rate: normal    (+) hypertension, CHF , hyperlipidemia,       Neuro/Psych  (+) headaches, numbness, psychiatric history,    GI/Hepatic/Renal/Endo    (+)  GERD,  renal disease, diabetes mellitus type 2, thyroid problem hypothyroidism    Musculoskeletal     Abdominal    Substance History - negative use     OB/GYN negative ob/gyn ROS         Other   arthritis,      ROS/Med Hx Other: PAT Nursing Notes unavailable.                 Latest Reference Range & Units 11/30/21 17:16   Glucose 65 - 99 mg/dL 90   Sodium 136 - 145 mmol/L 139   Potassium 3.5 - 5.2 mmol/L 4.1   CO2 22.0 - 29.0 mmol/L 28.4   Chloride 98 - 107 mmol/L 102   Anion Gap 5.0 - 15.0 mmol/L 8.6   Creatinine 0.57 - 1.00 mg/dL 1.04 (H)   BUN 8 - 23 mg/dL 18   BUN/Creatinine Ratio 7.0 - 25.0  17.3   Calcium 8.6 - 10.5 mg/dL 8.9   eGFR Non African Am >60 mL/min/1.73 53 (L)   Alkaline Phosphatase 39 - 117 U/L 71   Total Protein 6.0 - 8.5 g/dL 6.4   ALT (SGPT) 1 - 33 U/L 8   AST (SGOT) 1 - 32 U/L 12   Total Bilirubin 0.0 - 1.2 mg/dL <0.2   Albumin 3.50 - 5.20 g/dL 4.20   Globulin gm/dL 2.2   A/G Ratio g/dL 1.9   (H): Data is abnormally high  (L): Data is abnormally low     Latest Reference Range & Units 04/30/21 14:30   Hemoglobin 12.0 - 16.0 g/dL 12.8   Hematocrit 37.0 - 47.0 % 41.3     IMPRESSION:                 1. 2.2 cm right lower lobe lung mass worrisome for primary lung carcinoma.  2. Extensive right hilar, mediastinal, and subcarinal lymphadenopathy.  The lymphadenopathy likely   is the cause of  the patient's dysphagia.  It is worth noting that the patient had lymphadenopathy   on old studies dated 2007 probably on the basis of granulomatous disease.  This has progressed   significantly and likely reflects both granulomatous disease and teresa disease related to a primary   lung cancer.  3. Emphysematous lung disease  4. Volume loss in the right middle lobe secondary to narrowing of the right lower lobe and right   middle lobe bronchi due to teresa disease.  5. Intra and extrahepatic biliary dilatation that is increased significantly compared with the   patient's previous scan.  Please note that the distal common bile duct is not imaged on the study.    I would recommend a dedicated CT of the abdomen and pelvis with IV and oral contrast.  6. Coronary artery calcifications                Anesthesia Plan    ASA 3     general     (Total IV Anesthesia    Patient understands anesthesia not responsible for dental damage.    Lymph node that appeared with scan  )  intravenous induction     Anesthetic plan, risks, benefits, and alternatives have been provided, discussed and informed consent has been obtained with: patient.    Plan discussed with CRNA.        CODE STATUS:

## 2022-11-16 NOTE — OP NOTE
Bronchoscopy Procedure Note    Procedure:  Bronchoscopy with bronchoalveolar lavage Jeannine right mainstem and right lower lobe  Bronchoscopy with bronchial brushing Jeannine bronchus intermedius  Bronchoscopy with transbronchial biopsies Jeannine and bronchus intermedius  Bronchoscopy with bronchial washings tracheobronchial tree    Pre-Operative Diagnosis: Right lower lobe mass with hilar subcarinal adenopathy  Post-Operative Diagnosis: Carinal mass, mass in the bronchus intermedius and right middle and left mainstem bronchi    Indication:  Evaluation of right lung mass    Anesthesia: MAC anesthesia    Procedure Details: Patient was consented for the procedure with all risk and benefit of the procedure explained in detail. Patient was given the opportunity to ask questions and all concerns were answered.    The bronchoscope was inserted into the main airway via the mouth. An anatomical survey was done of the main airways and the subsegmental bronchus. The findings are reported below. A bronchoalveolar lavage  was performed using 30 ml aliquots of normal saline and instilled into the airways then aspirated back blood colored fluid from right mainstem and bronchus intermedius  Then bronchial brushing was obtained from Jeannnie bronchus intermedius with 5 passes. Blind transbronchial biopsies were done from Jeannine and bronchus intermedius with several passes under direct vision. Patient had 15 ml oozing after the biopsy.    Findings:  Friable mucosa, easy bleeding with suction  Scattered purulent secretions    Estimated Blood Loss: 15 ml.    Specimens:  BAL right lung  Bronchial brushing Jeannine and bronchus intermedius  Transbronchial biopsies Jeannine and bronchus intermedius  Bronchial washings tracheobronchial tree    Complications: None; patient tolerated the procedure well.    Disposition: To home when stable with vitals and follow-up at my office in 1 week for results discussion    Patient tolerated the procedure  well.      Electronically signed by Joanne Varghese MD, 11/16/2022, 14:09 EST.

## 2022-11-22 PROBLEM — C34.91 PRIMARY LUNG CANCER, RIGHT (HCC): Chronic | Status: ACTIVE | Noted: 2022-01-01

## 2022-11-22 PROBLEM — R13.10 DYSPHAGIA: Status: ACTIVE | Noted: 2022-01-01

## 2022-11-22 PROBLEM — J18.9 PNEUMONIA OF LEFT LOWER LOBE DUE TO INFECTIOUS ORGANISM: Status: ACTIVE | Noted: 2022-01-01

## 2022-11-22 PROBLEM — G47.09 OTHER INSOMNIA: Status: ACTIVE | Noted: 2019-11-15

## 2022-11-22 PROBLEM — J98.11 ATELECTASIS: Chronic | Status: ACTIVE | Noted: 2022-01-01

## 2022-11-22 PROBLEM — R05.3 CHRONIC COUGH: Chronic | Status: ACTIVE | Noted: 2022-01-01

## 2022-11-22 PROBLEM — E87.6 HYPOKALEMIA: Status: ACTIVE | Noted: 2022-01-01

## 2022-11-22 PROBLEM — G44.229 CHRONIC TENSION-TYPE HEADACHE, NOT INTRACTABLE: Chronic | Status: ACTIVE | Noted: 2022-01-01

## 2022-11-22 PROBLEM — J44.9 COPD (CHRONIC OBSTRUCTIVE PULMONARY DISEASE) (HCC): Status: ACTIVE | Noted: 2022-01-01

## 2022-11-22 PROBLEM — E78.00 HYPERCHOLESTEREMIA: Chronic | Status: ACTIVE | Noted: 2022-01-01

## 2022-11-22 PROBLEM — J41.0 SIMPLE CHRONIC BRONCHITIS (HCC): Chronic | Status: ACTIVE | Noted: 2022-01-01

## 2022-11-23 PROBLEM — E43 SEVERE MALNUTRITION (HCC): Status: ACTIVE | Noted: 2022-01-01

## 2022-11-25 PROBLEM — E87.6 HYPOKALEMIA: Status: RESOLVED | Noted: 2022-01-01 | Resolved: 2022-01-01

## 2022-11-25 PROBLEM — G47.09 OTHER INSOMNIA: Status: RESOLVED | Noted: 2019-11-15 | Resolved: 2022-01-01

## 2022-11-26 NOTE — OUTREACH NOTE
Prep Survey    Flowsheet Row Responses   [facility] patient discharged from? [facility]   Is LACE score < 7 ? No   Emergency Room discharge w/ pulse ox? No   Eligibility Readm Mgmt   Discharge diagnosis **Pneumonia of left lower lobe due to infectious organism    Does the patient have one of the following disease processes/diagnoses(primary or secondary)? Pneumonia   Does the patient have Home health ordered? No   Is there a DME ordered? Yes   What DME was ordered? Oxygen per [employer]    Prep survey completed? Yes          KARRI BOYER - Registered Nurse

## 2022-11-29 NOTE — OUTREACH NOTE
COPD/PN Week 1 Survey    Flowsheet Row Responses   [facility] patient discharged from? [facility]   Does the patient have one of the following disease processes/diagnoses(primary or secondary)? Pneumonia   Week 1 attempt successful? Yes   Call start time 1350   Call end time 1355   Discharge diagnosis **Pneumonia of left lower lobe due to infectious organism    Meds reviewed with patient/caregiver? Yes   Is the patient having any side effects they believe may be caused by any medication additions or changes? No   Does the patient have all medications ordered at discharge? Yes   Is the patient taking all medications as directed (includes completed medication regime)? Yes   Does the patient have a primary care provider?  Yes   Does the patient have an appointment with their PCP or specialist within 7 days of discharge? Yes   Has the patient kept scheduled appointments due by today? Yes   Pulse Ox monitoring None   Psychosocial issues? No   Did the patient receive a copy of their discharge instructions? Yes   Nursing interventions Reviewed instructions with patient   What is the patient's perception of their health status since discharge? Improving   If the patient is a current smoker, are they able to teach back resources for cessation? Not a smoker   Additional teach back comments Patient states she can't catch her breath and is using her nebulizer at time of call.  Noticeable wheezing during call.  She will use inhaler and if no improvement she will go to ED to be evaluated.     Is the patient able to teach back COPD zones? Yes   Nursing interventions Education provided on various zones   Patient reports what zone on this call? Red Zone   Red Zone Chest pains, Not able to do any activity because of breathing, Severe shortness of breath even at rest, Fever or shaking chills, chest pain   Red Zone interventions Call 911 or seek medical care immediately   While getting help, immediately do the following Use oxygen as  ordered, Use quick relief inhalers as ordered   Is the patient/caregiver able to teach back signs and symptoms of worsening condition: Fever/chills, Shortness of breath, Chest pain   Is the patient/caregiver able to teach back importance of completing antibiotic course of treatment? Yes   Week 1 call completed? Yes   Wrap up additional comments Pt to use nebulizer and inhaler.  If no improvemnt, will go to ED.          RAY DÍAZ - Licensed Nurse

## 2022-11-30 PROBLEM — I26.99 ACUTE PULMONARY EMBOLISM, UNSPECIFIED PULMONARY EMBOLISM TYPE, UNSPECIFIED WHETHER ACUTE COR PULMONALE PRESENT: Status: ACTIVE | Noted: 2022-01-01

## 2022-12-02 ENCOUNTER — READMISSION MANAGEMENT (OUTPATIENT)
Dept: CALL CENTER | Facility: HOSPITAL | Age: 67
End: 2022-12-02

## 2022-12-02 NOTE — OUTREACH NOTE
COPD/PN Week 2 Survey    Flowsheet Row Responses   [facility]? [facility]   Does the patient have one of the following disease processes/diagnoses(primary or secondary)? Pneumonia   Week 2 attempt successful? No   Unsuccessful attempts Attempt 1   Revoke Patient           DIANE HEDRICK - Registered Nurse

## 2022-12-04 LAB
BACTERIA SPEC AEROBE CULT: NORMAL
BACTERIA SPEC AEROBE CULT: NORMAL

## 2022-12-10 NOTE — PROGRESS NOTES
"If you have any questions about this query contact me at: [email or web address]    Enter Query Response Below      Query Response: acute pulmonary emboli with acute cor pulmonale             If applicable, please update the problem list.         Dr. Varghese,    Patient who presented to ER with shortness of breath was diagnosed with acute pulmonary embolism. CT scan showed \"Several foci of pulmonary emboli within the right middle and bilateral lobe segmental branches, with suggestion of developing right heart strain\". Vitals on admission included: T-97.8, -122, RR-28-30, B/P-98/60, O2 sat 86% on 5L/NC and 88% on 10 L high flow O2. While still in ER, the patient became unresponsive, requiring emergent intubation and proceeded to cardiac arrest. An echocardiogram was not available for review. H&P note states, \"Acute pulmonary embolism with right heart strain\". After study, can you please specify the acute pulmonary embolism as:    Acute pulmonary embolism with acute cor pulmonale  Acute pulmonary embolism without acute cor pulmonale  Other, ________________________  Unable to be clinically determined      By submitting this query, we are merely seeking further clarification of documentation to accurately reflect all conditions that you are monitoring, evaluating, treating or that extend the hospitalization or utilize additional resources of care. Please utilize your independent clinical judgment when addressing the question(s) above.     This query and your response, once completed, will be entered into the legal medical record.    Sincerely,  Marie Sullivan RN  Clinical Documentation Integrity Program     "

## 2022-12-10 NOTE — PROGRESS NOTES
"If you have any questions about this query contact me at: [email or web address]    Enter Query Response Below      Query Response: Leukocytosis due to lung cancer in the bronchial tube with post obstructive consolidation and pneumonia.             If applicable, please update the problem list.         Dr. Varghese,    Patient who was newly diagnosed with lung cancer and recently treated for pneumonia was admitted with Acute pulmonary embolism and found to have Leukocytosis. WBC 16.96 on admission. ER Physician stated \"The patient had recently been on steroids and the white blood cell count is most likely related to the steroids\". CT scan showed \"Consolidation throughout right lung with relative sparing of right middle lobe, suggesting pneumonia\". Patient was afebrile. After study, can you please specify the etiology of the Leukocytosis as:    Suspected to be due to steroids  Suspected to be due to lung cancer   Other, please specify__________________________  Unable to be clinically determined    By submitting this query, we are merely seeking further clarification of documentation to accurately reflect all conditions that you are monitoring, evaluating, treating or that extend the hospitalization or utilize additional resources of care. Please utilize your independent clinical judgment when addressing the question(s) above.     This query and your response, once completed, will be entered into the legal medical record.    Sincerely,  Marie Sullivan RN  Clinical Documentation Integrity Program     "

## 2022-12-13 ENCOUNTER — APPOINTMENT (OUTPATIENT)
Dept: MRI IMAGING | Facility: HOSPITAL | Age: 67
End: 2022-12-13

## 2022-12-14 LAB
FUNGUS WND CULT: NORMAL
FUNGUS WND CULT: NORMAL

## 2022-12-28 LAB
MYCOBACTERIUM SPEC CULT: NORMAL
MYCOBACTERIUM SPEC CULT: NORMAL
NIGHT BLUE STAIN TISS: NORMAL

## 2024-11-23 NOTE — OUTREACH NOTE
Ambulatory Case Management Note    CCM Interim Update    Chart review from last PCP follow up, no changes, ordered benadryl for rash and referral to dermatology, continue use of Linzess for constipation and Abilify for depression and is to return in 3 months around 1/22/2022 for f/u. Needs lab at next follow up.        There are no recently modified care plans to display for this patient.      Sahara Zarate RN  Ambulatory Case Management    10/26/2021, 13:28 EDT     Physical Therapy    Visit Type: treatment    Relevant History/Co-morbidities: TBI s/p fall    SUBJECTIVE  No complaints, agreeable to therapy.   Patient / Family Goal: return to previous functional status, maximize function and return home    Pain   Patient denies pain.     OBJECTIVE      Vitals:  Attempted to obtain SpO2 however unable to get accurate reading on finger.          Bed Mobility  - Supine to sit: modified independent  Transfers  Assistive devices: none  - Sit to stand: stand by assist  - Stand to sit: stand by assist  - Stand pivot: stand by assist    Ambulation / Gait  - Assistive device: gait belt and none  - Distance (feet unless otherwise indicated): 520, 240, 60 (x 2)  - Assist Level: stand by assist and contact guard/touching/steadying assist  - Surface: even  - Description: unsteady, decreased franklin/pace and narrow base of support    Slight LOB noted intermittently when turning head or changing direction, pt able to self-recover w/ CGA from therapist.   Stair Ambulation  - Number of steps: 12;   - Assist Level: stand by assist  - Railing: right  - Pattern: step-to  - Devices Used: gait belt  Cues for decr speed to improve balance        Interventions  Neuromuscular Re-Education  -ambulating around gym while finding and retrieving cones (navigating around objects, in narrow spaces, multidirectional stepping, reaching outside FUENTES, and turning head) to improve postural control and safety awareness while multitasking.     -- Blazepods utilized this session. BlazePod is a smart platform that helps you train your reaction time, agility, decision-making, and more with light-based pods. The BlazePod platform uses visual cues that stimulate physical & cognitive activity with small, light-up Pods controlled by an interactive keshia designed for every sports, fitness, and therapy field.     During this session the pods were set up on mirror and ground. Pt was instructed to tap the colored pods as they lit  up as fast as possible. Patient completed 2 rounds.    Attempt 1:   Hits: 44  Attempt 2:   Hits: 50    Balance Training  -stepping over SC/SBQC in + pattern x 2 bouts, CGA/min A for balance.   Skilled input: verbal instruction/cues and tactile instruction/cues  Verbal Consent: Writer verbally educated and received verbal consent for hand placement, positioning of patient, and techniques to be performed today from patient for clothing adjustments for techniques, hand placement and palpation for techniques and therapist position for techniques as described above and how they are pertinent to the patient's plan of care.         ASSESSMENT   Impairments: balance, cardiovascular endurance, strength and safety awareness  Functional Limitations: sit to/from stand transfers, stand pivot transfers, stair climbing and ambulation     Pt req's frequent prolonged rest breaks throughout session 2' SOB and fatigue.       Discharge Recommendations  Recommendation for Discharge: PT IL: Patient requires 24 HOUR assistance to perform mobility and/or ADLs safely, Patient is appropriate for Physical Therapy 1-3 times per week (day rehab)  PT/OT Mobility Equipment for Discharge: likely none  PT/OT ADL Equipment for Discharge: Pt owns shower chair. likely none, TBD.  PT Identified Barriers to Discharge: decr balance, distractible, decr insight, questionable cog deficits        Progress: progressing toward goals    Therapy Participation: This patient participated in all scheduled physical therapy time this session.    Education:   - Present and ready to learn: patient  Education provided during session:  - Home safety, discharge recs, progression of activity, current deficits, therapeutic prognosis  - Results of above outlined education: Verbalizes understanding and Demonstrates understanding    Patient at End of Session:   Location: in wheelchair  Safety measures: equipment intact and alarm system in place/re-engaged  Handoff to: rehab  aide/tech    PLAN   Suggestions for next session as indicated: PT Frequency: 5-7 days/week   Frequency Comments: 45-90min   Duration: 7 days     Interventions: balance, neuromuscular re-education, strengthening, stairs retraining, endurance training, functional transfer training, safety education, patient/family training, HEP train/position and community reintegration  Agreement to plan and goals: patient agrees with goals and treatment plan      GOALS  Review date: 11/22/2024  Short Term Goals (STGs): to be met 7 days from date established, unless otherwise stated.  STGs=LTGs  Long Term Goals (LTGs): to be met by discharge from rehab program.   - Patient will perform sit to/from stand at modified Independent level   - Patient will perform stand pivot transfers at modified Independent level with no device   - Patient will ambulate 300 feet at supervision level with no device   - Patient will negotiate 12 stairs at supervision level with no device and 1 railing  Documented in the chart in the following areas: Assessment/Plan.      Therapy procedure time and total treatment time can be found documented on the Time Entry flowsheet

## (undated) DEVICE — CUP SPECI 4OZ LF STRL

## (undated) DEVICE — SINGLE USE SUCTION VALVE MAJ-209: Brand: SINGLE USE SUCTION VALVE (STERILE)

## (undated) DEVICE — DISPOSABLE CYTOLOGY BRUSH: Brand: DISPOSABLE CYTOLOGY BRUSH

## (undated) DEVICE — SYR LUER SLPTP 50ML

## (undated) DEVICE — WANG TRANSBRONCHIAL HISTOLOGY NEEDLE FOR CENTRAL REGION, 1.9 MM X 130 CM: Brand: WANG

## (undated) DEVICE — DISPOSABLE BIOPSY FORCEPS: Brand: DISPOSABLE BIOPSY FORCEPS

## (undated) DEVICE — SINGLE USE BIOPSY VALVE MAJ-210: Brand: SINGLE USE BIOPSY VALVE (STERILE)

## (undated) DEVICE — TRAP,MUCUS SPECIMEN,40CC: Brand: MEDLINE

## (undated) DEVICE — BRONCH KIT: Brand: MEDLINE INDUSTRIES, INC.